# Patient Record
Sex: FEMALE | Race: WHITE | Employment: OTHER | URBAN - METROPOLITAN AREA
[De-identification: names, ages, dates, MRNs, and addresses within clinical notes are randomized per-mention and may not be internally consistent; named-entity substitution may affect disease eponyms.]

---

## 2021-05-26 ENCOUNTER — APPOINTMENT (OUTPATIENT)
Dept: CT IMAGING | Age: 76
End: 2021-05-26
Attending: EMERGENCY MEDICINE
Payer: MEDICARE

## 2021-05-26 ENCOUNTER — HOSPITAL ENCOUNTER (OUTPATIENT)
Age: 76
Setting detail: OBSERVATION
Discharge: HOME | End: 2021-05-28
Attending: EMERGENCY MEDICINE | Admitting: INTERNAL MEDICINE
Payer: MEDICARE

## 2021-05-26 ENCOUNTER — APPOINTMENT (OUTPATIENT)
Dept: GENERAL RADIOLOGY | Age: 76
End: 2021-05-26
Attending: EMERGENCY MEDICINE
Payer: MEDICARE

## 2021-05-26 DIAGNOSIS — M54.6 ACUTE BILATERAL THORACIC BACK PAIN: ICD-10-CM

## 2021-05-26 DIAGNOSIS — R55 NEAR SYNCOPE: Primary | ICD-10-CM

## 2021-05-26 DIAGNOSIS — R73.9 HYPERGLYCEMIA: ICD-10-CM

## 2021-05-26 DIAGNOSIS — R55 SYNCOPE AND COLLAPSE: ICD-10-CM

## 2021-05-26 DIAGNOSIS — I10 ESSENTIAL HYPERTENSION: Chronic | ICD-10-CM

## 2021-05-26 PROBLEM — R07.9 CHEST PAIN AT REST: Status: ACTIVE | Noted: 2021-05-26

## 2021-05-26 PROBLEM — E11.9 DM TYPE 2 (DIABETES MELLITUS, TYPE 2) (HCC): Status: ACTIVE | Noted: 2021-05-26

## 2021-05-26 LAB
ALBUMIN SERPL-MCNC: 3.4 G/DL (ref 3.5–5)
ALBUMIN/GLOB SERPL: 1.1 {RATIO} (ref 1.1–2.2)
ALP SERPL-CCNC: 74 U/L (ref 45–117)
ALT SERPL-CCNC: 29 U/L (ref 12–78)
ANION GAP SERPL CALC-SCNC: 9 MMOL/L (ref 5–15)
AST SERPL-CCNC: 25 U/L (ref 15–37)
BASOPHILS # BLD: 0.1 K/UL (ref 0–0.1)
BASOPHILS NFR BLD: 1 % (ref 0–1)
BILIRUB SERPL-MCNC: 0.7 MG/DL (ref 0.2–1)
BNP SERPL-MCNC: 196 PG/ML (ref 0–450)
BUN SERPL-MCNC: 29 MG/DL (ref 6–20)
BUN/CREAT SERPL: 20 (ref 12–20)
CALCIUM SERPL-MCNC: 8.9 MG/DL (ref 8.5–10.1)
CHLORIDE SERPL-SCNC: 103 MMOL/L (ref 97–108)
CO2 SERPL-SCNC: 25 MMOL/L (ref 21–32)
COMMENT, HOLDF: NORMAL
CREAT SERPL-MCNC: 1.48 MG/DL (ref 0.55–1.02)
D DIMER PPP FEU-MCNC: 0.9 MG/L FEU (ref 0–0.65)
DIFFERENTIAL METHOD BLD: NORMAL
EOSINOPHIL # BLD: 0.2 K/UL (ref 0–0.4)
EOSINOPHIL NFR BLD: 3 % (ref 0–7)
ERYTHROCYTE [DISTWIDTH] IN BLOOD BY AUTOMATED COUNT: 12.9 % (ref 11.5–14.5)
GLOBULIN SER CALC-MCNC: 3.1 G/DL (ref 2–4)
GLUCOSE BLD STRIP.AUTO-MCNC: 223 MG/DL (ref 65–117)
GLUCOSE SERPL-MCNC: 236 MG/DL (ref 65–100)
HCT VFR BLD AUTO: 36.2 % (ref 35–47)
HGB BLD-MCNC: 12.2 G/DL (ref 11.5–16)
IMM GRANULOCYTES # BLD AUTO: 0 K/UL (ref 0–0.04)
IMM GRANULOCYTES NFR BLD AUTO: 0 % (ref 0–0.5)
LYMPHOCYTES # BLD: 1.4 K/UL (ref 0.8–3.5)
LYMPHOCYTES NFR BLD: 19 % (ref 12–49)
MCH RBC QN AUTO: 30.2 PG (ref 26–34)
MCHC RBC AUTO-ENTMCNC: 33.7 G/DL (ref 30–36.5)
MCV RBC AUTO: 89.6 FL (ref 80–99)
MONOCYTES # BLD: 0.6 K/UL (ref 0–1)
MONOCYTES NFR BLD: 8 % (ref 5–13)
NEUTS SEG # BLD: 5.2 K/UL (ref 1.8–8)
NEUTS SEG NFR BLD: 69 % (ref 32–75)
NRBC # BLD: 0 K/UL (ref 0–0.01)
NRBC BLD-RTO: 0 PER 100 WBC
PLATELET # BLD AUTO: 267 K/UL (ref 150–400)
PMV BLD AUTO: 11 FL (ref 8.9–12.9)
POTASSIUM SERPL-SCNC: 4.8 MMOL/L (ref 3.5–5.1)
PROT SERPL-MCNC: 6.5 G/DL (ref 6.4–8.2)
RBC # BLD AUTO: 4.04 M/UL (ref 3.8–5.2)
SAMPLES BEING HELD,HOLD: NORMAL
SERVICE CMNT-IMP: ABNORMAL
SODIUM SERPL-SCNC: 137 MMOL/L (ref 136–145)
TROPONIN I SERPL-MCNC: <0.05 NG/ML
WBC # BLD AUTO: 7.4 K/UL (ref 3.6–11)

## 2021-05-26 PROCEDURE — 36415 COLL VENOUS BLD VENIPUNCTURE: CPT

## 2021-05-26 PROCEDURE — 84443 ASSAY THYROID STIM HORMONE: CPT

## 2021-05-26 PROCEDURE — 71275 CT ANGIOGRAPHY CHEST: CPT

## 2021-05-26 PROCEDURE — 99285 EMERGENCY DEPT VISIT HI MDM: CPT

## 2021-05-26 PROCEDURE — 71045 X-RAY EXAM CHEST 1 VIEW: CPT

## 2021-05-26 PROCEDURE — 65270000029 HC RM PRIVATE

## 2021-05-26 PROCEDURE — 83880 ASSAY OF NATRIURETIC PEPTIDE: CPT

## 2021-05-26 PROCEDURE — 85379 FIBRIN DEGRADATION QUANT: CPT

## 2021-05-26 PROCEDURE — 96360 HYDRATION IV INFUSION INIT: CPT

## 2021-05-26 PROCEDURE — 74011000636 HC RX REV CODE- 636: Performed by: EMERGENCY MEDICINE

## 2021-05-26 PROCEDURE — 93005 ELECTROCARDIOGRAM TRACING: CPT

## 2021-05-26 PROCEDURE — 85025 COMPLETE CBC W/AUTO DIFF WBC: CPT

## 2021-05-26 PROCEDURE — 84484 ASSAY OF TROPONIN QUANT: CPT

## 2021-05-26 PROCEDURE — 96361 HYDRATE IV INFUSION ADD-ON: CPT

## 2021-05-26 PROCEDURE — 74011250636 HC RX REV CODE- 250/636: Performed by: EMERGENCY MEDICINE

## 2021-05-26 PROCEDURE — 99218 HC RM OBSERVATION: CPT

## 2021-05-26 PROCEDURE — 80053 COMPREHEN METABOLIC PANEL: CPT

## 2021-05-26 PROCEDURE — 82962 GLUCOSE BLOOD TEST: CPT

## 2021-05-26 RX ORDER — MONTELUKAST SODIUM 10 MG/1
10 TABLET ORAL DAILY
COMMUNITY
Start: 2021-03-16

## 2021-05-26 RX ORDER — NATEGLINIDE 120 MG/1
120 TABLET ORAL
COMMUNITY
Start: 2021-04-22

## 2021-05-26 RX ORDER — ICOSAPENT ETHYL 1000 MG/1
2 CAPSULE ORAL 2 TIMES DAILY WITH MEALS
COMMUNITY
Start: 2021-04-20 | End: 2022-03-23

## 2021-05-26 RX ADMIN — IOPAMIDOL 100 ML: 755 INJECTION, SOLUTION INTRAVENOUS at 22:41

## 2021-05-26 RX ADMIN — SODIUM CHLORIDE 1000 ML: 900 INJECTION, SOLUTION INTRAVENOUS at 23:01

## 2021-05-26 RX ADMIN — SODIUM CHLORIDE 1000 ML: 9 INJECTION, SOLUTION INTRAVENOUS at 21:38

## 2021-05-26 NOTE — ED PROVIDER NOTES
76-year-old female with history of asthma, arthritis, type 2 diabetes, GERD, hypertension presents to the emergency department today with chief complaint of generalized fatigue and weakness with an episode of back pain which radiates to bilateral shoulders. At approximately 6:00 this evening she was making dinner when she had a sudden onset of presyncopal symptoms with lightheadedness with a pressure type pain in her upper back with radiation to bilateral shoulders. She felt short of breath and nausea at the time. She denies any recent fever. She was treated with prednisone for sinus infection approximately 2 to 3 weeks ago. She had some diarrhea after the onset of her fatigue which has since resolved. The patient endorses increased stress as her sister recently passed away. The history is provided by the patient and medical records. Fatigue  This is a new problem. The current episode started less than 1 hour ago. The problem has not changed since onset. There was no focality noted. Pertinent negatives include no focal weakness, no loss of sensation, no loss of balance, no speech difficulty, no agitation, no mental status change, no unresponsiveness and no disorientation. There has been no fever. Associated symptoms include shortness of breath and nausea. Pertinent negatives include no chest pain, no vomiting, no altered mental status, no confusion and no headaches. Associated medical issues do not include trauma.         Past Medical History:   Diagnosis Date    Arthritis     Asthma     Diabetes mellitus, type 2 ([address]     GERD (gastroesophageal reflux disease)     Hyperlipidemia     Hypertension     Thyroid cancer ([facility] [address]        Past Surgical History:   Procedure Laterality Date    HX APPENDECTOMY      HX  SECTION      HX DILATION AND CURETTAGE      HX THYROIDECTOMY      HX TOTAL ABDOMINAL HYSTERECTOMY           Family History:   Problem Relation Age of Onset    Asthma Father    Anahy Hinton Hypertension Father     Hypertension Mother     Stroke Mother     Cancer Sister         thyroid       Social History     Socioeconomic History    Marital status:      Spouse name: Not on file    Number of children: Not on file    Years of education: Not on file    Highest education level: Not on file   Occupational History    Not on file   Tobacco Use    Smoking status: Former Smoker    Smokeless tobacco: Never Used   Substance and Sexual Activity    Alcohol use: No    Drug use: No    Sexual activity: Yes   Other Topics Concern    Not on file   Social History Narrative    Not on file     Social Determinants of Health     Financial Resource Strain:     Difficulty of Paying Living Expenses:    Food Insecurity:     Worried About [address] in the Last Year:     [address] [employer] in the Last Year:    Transportation Needs:     Lack of Transportation (Medical):  Lack of Transportation (Non-Medical):    Physical Activity:     Days of Exercise per Week:     Minutes of Exercise per Session:    Stress:     Feeling of Stress :    Social Connections:     Frequency of Communication with Friends and Family:     Frequency of Social Gatherings with Friends and Family:     Attends Gnosticism Services:     Active Member of Clubs or Organizations:     Attends Club or Organization Meetings:     Marital Status:    Intimate Partner Violence:     Fear of Current or Ex-Partner:     Emotionally Abused:     Physically Abused:     Sexually Abused: ALLERGIES: Adhesive, Ciprofloxacin-dexamethasone, Codeine, Diflucan [fluconazole], Statins-hmg-coa reductase inhibitors, and Sulfa (sulfonamide antibiotics)    Review of Systems   Constitutional: Positive for fatigue. Negative for fever. HENT: Negative for sneezing and sore throat. Respiratory: Positive for shortness of breath. Negative for cough. Cardiovascular: Negative for chest pain and leg swelling.    Gastrointestinal: Positive for nausea. Negative for abdominal pain, diarrhea and vomiting. Genitourinary: Negative for difficulty urinating and dysuria. Musculoskeletal: Negative for arthralgias and myalgias. Skin: Negative for color change and rash. Neurological: Negative for focal weakness, speech difficulty, weakness, headaches and loss of balance. Psychiatric/Behavioral: Negative for agitation, behavioral problems and confusion. There were no vitals filed for this visit. Physical Exam  Vitals and nursing note reviewed. Constitutional:       General: She is not in acute distress. Appearance: Normal appearance. She is well-developed. She is not ill-appearing, toxic-appearing or diaphoretic. HENT:      Head: Normocephalic and atraumatic. Nose: Nose normal.      Mouth/Throat:      Mouth: Mucous membranes are moist.      Pharynx: Oropharynx is clear. Eyes:      Extraocular Movements: Extraocular movements intact. Conjunctiva/sclera: Conjunctivae normal.      Pupils: Pupils are equal, round, and reactive to light. Cardiovascular:      Rate and Rhythm: Normal rate and regular rhythm. Pulses: Normal pulses. Heart sounds: Normal heart sounds. Pulmonary:      Effort: Pulmonary effort is normal. No respiratory distress. Breath sounds: Normal breath sounds. No wheezing. Chest:      Chest wall: No tenderness. Abdominal:      General: Abdomen is flat. There is no distension. Palpations: Abdomen is soft. Tenderness: There is no abdominal tenderness. There is no guarding or rebound. Musculoskeletal:         General: No swelling, tenderness, deformity or signs of injury. Normal range of motion. Cervical back: Normal range of motion and neck supple. No rigidity. No muscular tenderness. Right lower leg: No edema. Left lower leg: No edema. Skin:     General: Skin is warm and dry. Capillary Refill: Capillary refill takes less than 2 seconds.    Neurological: General: No focal deficit present. Mental Status: She is alert and oriented to person, place, and time. Psychiatric:         Mood and Affect: Mood normal.         Behavior: Behavior normal.          MDM  Number of Diagnoses or Management Options     Amount and/or Complexity of Data Reviewed  Tests in the radiology section of CPT®: reviewed           Procedures          ED EKG interpretation:  Rhythm: normal sinus rhythm. Rate (approx.): 72. Axis: normal.  ST segment:  No concerning ST elevations or depressions. This EKG was interpreted by Pratibha Allen MD,ED Provider. 7:15 PM  Change of shift. Care of patient signed over to Dr. Irma Bliss. Handoff complete.

## 2021-05-26 NOTE — ED NOTES
Bedside and Verbal shift change report given to Ayan (oncoming nurse) by Jailyn Torres RN (offgoing nurse). Report included the following information SBAR, Kardex, ED Summary and MAR.

## 2021-05-26 NOTE — ED TRIAGE NOTES
Pt arrived with her daughter to be assessed for dizziness,feeling like she is going to  pass out, back pain across the her shoulder blades. P t is on heart medication daily. Onset was sudden, pt was trying to cook dinner, and felt like she was going to pass out. Pt ambulated in with her daughter holding and guiding her one side.

## 2021-05-27 ENCOUNTER — APPOINTMENT (OUTPATIENT)
Dept: NON INVASIVE DIAGNOSTICS | Age: 76
End: 2021-05-27
Attending: INTERNAL MEDICINE
Payer: MEDICARE

## 2021-05-27 LAB
ALBUMIN SERPL-MCNC: 3 G/DL (ref 3.5–5)
ALBUMIN/GLOB SERPL: 1.2 {RATIO} (ref 1.1–2.2)
ALP SERPL-CCNC: 62 U/L (ref 45–117)
ALT SERPL-CCNC: 22 U/L (ref 12–78)
ANION GAP SERPL CALC-SCNC: 6 MMOL/L (ref 5–15)
AST SERPL-CCNC: 14 U/L (ref 15–37)
ATRIAL RATE: 62 BPM
ATRIAL RATE: 72 BPM
BILIRUB DIRECT SERPL-MCNC: 0.1 MG/DL (ref 0–0.2)
BILIRUB SERPL-MCNC: 0.5 MG/DL (ref 0.2–1)
BUN SERPL-MCNC: 20 MG/DL (ref 6–20)
BUN/CREAT SERPL: 20 (ref 12–20)
CALCIUM SERPL-MCNC: 8.4 MG/DL (ref 8.5–10.1)
CALCULATED P AXIS, ECG09: 28 DEGREES
CALCULATED P AXIS, ECG09: 51 DEGREES
CALCULATED R AXIS, ECG10: -18 DEGREES
CALCULATED R AXIS, ECG10: -8 DEGREES
CALCULATED T AXIS, ECG11: 41 DEGREES
CALCULATED T AXIS, ECG11: 65 DEGREES
CHLORIDE SERPL-SCNC: 110 MMOL/L (ref 97–108)
CO2 SERPL-SCNC: 24 MMOL/L (ref 21–32)
CREAT SERPL-MCNC: 0.98 MG/DL (ref 0.55–1.02)
DIAGNOSIS, 93000: NORMAL
DIAGNOSIS, 93000: NORMAL
ECHO AO ASC DIAM: 3.15 CM
ECHO AO ROOT DIAM: 3.17 CM
ECHO AV AREA PEAK VELOCITY: 2.46 CM2
ECHO AV AREA/BSA PEAK VELOCITY: 1.4 CM2/M2
ECHO AV PEAK GRADIENT: 6.38 MMHG
ECHO AV PEAK VELOCITY: 126.26 CM/S
ECHO IVC PROX: 1.93 CM
ECHO LA AREA 4C: 15.18 CM2
ECHO LA MAJOR AXIS: 2.6 CM
ECHO LA MINOR AXIS: 1.43 CM
ECHO LA VOL 2C: 38.43 ML (ref 22–52)
ECHO LA VOL 4C: 34.42 ML (ref 22–52)
ECHO LA VOL BP: 42.42 ML (ref 22–52)
ECHO LA VOL/BSA BIPLANE: 23.31 ML/M2 (ref 16–28)
ECHO LA VOLUME INDEX A2C: 21.12 ML/M2 (ref 16–28)
ECHO LA VOLUME INDEX A4C: 18.91 ML/M2 (ref 16–28)
ECHO LV E' LATERAL VELOCITY: 7.39 CENTIMETER/SECOND
ECHO LV E' SEPTAL VELOCITY: 5.09 CENTIMETER/SECOND
ECHO LV INTERNAL DIMENSION DIASTOLIC: 4.31 CM (ref 3.9–5.3)
ECHO LV INTERNAL DIMENSION SYSTOLIC: 2.5 CM
ECHO LV IVSD: 0.78 CM (ref 0.6–0.9)
ECHO LV MASS 2D: 102.3 G (ref 67–162)
ECHO LV MASS INDEX 2D: 56.2 G/M2 (ref 43–95)
ECHO LV POSTERIOR WALL DIASTOLIC: 0.78 CM (ref 0.6–0.9)
ECHO LVOT DIAM: 1.88 CM
ECHO LVOT PEAK GRADIENT: 4.96 MMHG
ECHO LVOT PEAK VELOCITY: 111.35 CM/S
ECHO MV A VELOCITY: 141.21 CENTIMETER/SECOND
ECHO MV AREA PHT: 1.89 CM2
ECHO MV E DECELERATION TIME (DT): 400.67 MS
ECHO MV E VELOCITY: 83.18 CENTIMETER/SECOND
ECHO MV PRESSURE HALF TIME (PHT): 116.2 MS
ECHO PV MAX VELOCITY: 84.69 CM/S
ECHO PV PEAK INSTANTANEOUS GRADIENT SYSTOLIC: 2.87 MMHG
ECHO RV INTERNAL DIMENSION: 3.83 CM
ECHO RV TAPSE: 1.73 CM (ref 1.5–2)
ECHO TV REGURGITANT MAX VELOCITY: 249.82 CM/S
ECHO TV REGURGITANT PEAK GRADIENT: 24.96 MMHG
ERYTHROCYTE [DISTWIDTH] IN BLOOD BY AUTOMATED COUNT: 13.1 % (ref 11.5–14.5)
EST. AVERAGE GLUCOSE BLD GHB EST-MCNC: 186 MG/DL
GLOBULIN SER CALC-MCNC: 2.6 G/DL (ref 2–4)
GLUCOSE BLD STRIP.AUTO-MCNC: 132 MG/DL (ref 65–117)
GLUCOSE BLD STRIP.AUTO-MCNC: 144 MG/DL (ref 65–117)
GLUCOSE BLD STRIP.AUTO-MCNC: 156 MG/DL (ref 65–117)
GLUCOSE BLD STRIP.AUTO-MCNC: 160 MG/DL (ref 65–117)
GLUCOSE BLD STRIP.AUTO-MCNC: 171 MG/DL (ref 65–117)
GLUCOSE SERPL-MCNC: 188 MG/DL (ref 65–100)
HBA1C MFR BLD: 8.1 % (ref 4–5.6)
HCT VFR BLD AUTO: 33.6 % (ref 35–47)
HGB BLD-MCNC: 10.9 G/DL (ref 11.5–16)
LA VOL DISK BP: 38.61 ML (ref 22–52)
MAGNESIUM SERPL-MCNC: 2.1 MG/DL (ref 1.6–2.4)
MCH RBC QN AUTO: 30 PG (ref 26–34)
MCHC RBC AUTO-ENTMCNC: 32.4 G/DL (ref 30–36.5)
MCV RBC AUTO: 92.6 FL (ref 80–99)
NRBC # BLD: 0 K/UL (ref 0–0.01)
NRBC BLD-RTO: 0 PER 100 WBC
P-R INTERVAL, ECG05: 148 MS
P-R INTERVAL, ECG05: 170 MS
PHOSPHATE SERPL-MCNC: 2.8 MG/DL (ref 2.6–4.7)
PLATELET # BLD AUTO: 218 K/UL (ref 150–400)
PMV BLD AUTO: 10.4 FL (ref 8.9–12.9)
POTASSIUM SERPL-SCNC: 4.5 MMOL/L (ref 3.5–5.1)
PROT SERPL-MCNC: 5.6 G/DL (ref 6.4–8.2)
Q-T INTERVAL, ECG07: 412 MS
Q-T INTERVAL, ECG07: 436 MS
QRS DURATION, ECG06: 70 MS
QRS DURATION, ECG06: 78 MS
QTC CALCULATION (BEZET), ECG08: 442 MS
QTC CALCULATION (BEZET), ECG08: 451 MS
RBC # BLD AUTO: 3.63 M/UL (ref 3.8–5.2)
SERVICE CMNT-IMP: ABNORMAL
SODIUM SERPL-SCNC: 140 MMOL/L (ref 136–145)
TROPONIN I SERPL-MCNC: <0.05 NG/ML
TSH SERPL DL<=0.05 MIU/L-ACNC: 1.01 UIU/ML (ref 0.36–3.74)
VENTRICULAR RATE, ECG03: 62 BPM
VENTRICULAR RATE, ECG03: 72 BPM
WBC # BLD AUTO: 5.6 K/UL (ref 3.6–11)

## 2021-05-27 PROCEDURE — 84484 ASSAY OF TROPONIN QUANT: CPT

## 2021-05-27 PROCEDURE — 96372 THER/PROPH/DIAG INJ SC/IM: CPT

## 2021-05-27 PROCEDURE — 74011250636 HC RX REV CODE- 250/636: Performed by: INTERNAL MEDICINE

## 2021-05-27 PROCEDURE — 83036 HEMOGLOBIN GLYCOSYLATED A1C: CPT

## 2021-05-27 PROCEDURE — 85027 COMPLETE CBC AUTOMATED: CPT

## 2021-05-27 PROCEDURE — 96374 THER/PROPH/DIAG INJ IV PUSH: CPT

## 2021-05-27 PROCEDURE — 74011250637 HC RX REV CODE- 250/637: Performed by: INTERNAL MEDICINE

## 2021-05-27 PROCEDURE — 99218 HC RM OBSERVATION: CPT

## 2021-05-27 PROCEDURE — 97535 SELF CARE MNGMENT TRAINING: CPT

## 2021-05-27 PROCEDURE — 74011250637 HC RX REV CODE- 250/637: Performed by: FAMILY MEDICINE

## 2021-05-27 PROCEDURE — 80048 BASIC METABOLIC PNL TOTAL CA: CPT

## 2021-05-27 PROCEDURE — 36415 COLL VENOUS BLD VENIPUNCTURE: CPT

## 2021-05-27 PROCEDURE — 93306 TTE W/DOPPLER COMPLETE: CPT

## 2021-05-27 PROCEDURE — 80076 HEPATIC FUNCTION PANEL: CPT

## 2021-05-27 PROCEDURE — 97165 OT EVAL LOW COMPLEX 30 MIN: CPT

## 2021-05-27 PROCEDURE — 74011636637 HC RX REV CODE- 636/637: Performed by: INTERNAL MEDICINE

## 2021-05-27 PROCEDURE — 84100 ASSAY OF PHOSPHORUS: CPT

## 2021-05-27 PROCEDURE — 83735 ASSAY OF MAGNESIUM: CPT

## 2021-05-27 PROCEDURE — 93005 ELECTROCARDIOGRAM TRACING: CPT

## 2021-05-27 PROCEDURE — 97161 PT EVAL LOW COMPLEX 20 MIN: CPT

## 2021-05-27 PROCEDURE — 82962 GLUCOSE BLOOD TEST: CPT

## 2021-05-27 PROCEDURE — 99220 PR INITIAL OBSERVATION CARE/DAY 70 MINUTES: CPT | Performed by: SPECIALIST

## 2021-05-27 RX ORDER — BACLOFEN 20 MG
400 TABLET ORAL DAILY
COMMUNITY

## 2021-05-27 RX ORDER — ONDANSETRON 2 MG/ML
4 INJECTION INTRAMUSCULAR; INTRAVENOUS
Status: DISCONTINUED | OUTPATIENT
Start: 2021-05-27 | End: 2021-05-28 | Stop reason: HOSPADM

## 2021-05-27 RX ORDER — ACETAMINOPHEN 650 MG/1
650 SUPPOSITORY RECTAL
Status: DISCONTINUED | OUTPATIENT
Start: 2021-05-27 | End: 2021-05-28 | Stop reason: HOSPADM

## 2021-05-27 RX ORDER — SODIUM CHLORIDE 0.9 % (FLUSH) 0.9 %
5-40 SYRINGE (ML) INJECTION EVERY 8 HOURS
Status: DISCONTINUED | OUTPATIENT
Start: 2021-05-27 | End: 2021-05-28 | Stop reason: HOSPADM

## 2021-05-27 RX ORDER — FACIAL-BODY WIPES
10 EACH TOPICAL DAILY PRN
Status: DISCONTINUED | OUTPATIENT
Start: 2021-05-27 | End: 2021-05-28 | Stop reason: HOSPADM

## 2021-05-27 RX ORDER — ACETAMINOPHEN 325 MG/1
650 TABLET ORAL
Status: DISCONTINUED | OUTPATIENT
Start: 2021-05-27 | End: 2021-05-28 | Stop reason: HOSPADM

## 2021-05-27 RX ORDER — LISINOPRIL 20 MG/1
20 TABLET ORAL ONCE
Status: COMPLETED | OUTPATIENT
Start: 2021-05-27 | End: 2021-05-27

## 2021-05-27 RX ORDER — PROMETHAZINE HYDROCHLORIDE 25 MG/1
12.5 TABLET ORAL
Status: DISCONTINUED | OUTPATIENT
Start: 2021-05-27 | End: 2021-05-28 | Stop reason: HOSPADM

## 2021-05-27 RX ORDER — MORPHINE SULFATE 2 MG/ML
1 INJECTION, SOLUTION INTRAMUSCULAR; INTRAVENOUS
Status: DISCONTINUED | OUTPATIENT
Start: 2021-05-27 | End: 2021-05-28 | Stop reason: HOSPADM

## 2021-05-27 RX ORDER — HYDRALAZINE HYDROCHLORIDE 20 MG/ML
20 INJECTION INTRAMUSCULAR; INTRAVENOUS
Status: DISCONTINUED | OUTPATIENT
Start: 2021-05-27 | End: 2021-05-28 | Stop reason: HOSPADM

## 2021-05-27 RX ORDER — MELATONIN
5000 DAILY
COMMUNITY

## 2021-05-27 RX ORDER — SODIUM CHLORIDE 0.9 % (FLUSH) 0.9 %
5-40 SYRINGE (ML) INJECTION AS NEEDED
Status: DISCONTINUED | OUTPATIENT
Start: 2021-05-27 | End: 2021-05-28 | Stop reason: HOSPADM

## 2021-05-27 RX ORDER — UMECLIDINIUM 62.5 UG/1
1 AEROSOL, POWDER ORAL DAILY
COMMUNITY

## 2021-05-27 RX ORDER — LISINOPRIL 20 MG/1
20 TABLET ORAL DAILY
Status: DISCONTINUED | OUTPATIENT
Start: 2021-05-28 | End: 2021-05-28 | Stop reason: HOSPADM

## 2021-05-27 RX ORDER — LEVOTHYROXINE SODIUM 100 UG/1
100 TABLET ORAL
Status: DISCONTINUED | OUTPATIENT
Start: 2021-05-27 | End: 2021-05-28 | Stop reason: HOSPADM

## 2021-05-27 RX ORDER — SAME BUTANEDISULFONATE/BETAINE 400-600 MG
250 POWDER IN PACKET (EA) ORAL 2 TIMES DAILY
COMMUNITY

## 2021-05-27 RX ORDER — LANOLIN ALCOHOL/MO/W.PET/CERES
325 CREAM (GRAM) TOPICAL
COMMUNITY

## 2021-05-27 RX ORDER — SODIUM CHLORIDE 9 MG/ML
25 INJECTION, SOLUTION INTRAVENOUS AS NEEDED
Status: DISCONTINUED | OUTPATIENT
Start: 2021-05-27 | End: 2021-05-28 | Stop reason: HOSPADM

## 2021-05-27 RX ORDER — SODIUM CHLORIDE 9 MG/ML
75 INJECTION, SOLUTION INTRAVENOUS CONTINUOUS
Status: DISCONTINUED | OUTPATIENT
Start: 2021-05-27 | End: 2021-05-28 | Stop reason: HOSPADM

## 2021-05-27 RX ORDER — GUAIFENESIN 100 MG/5ML
81 LIQUID (ML) ORAL DAILY
Status: DISCONTINUED | OUTPATIENT
Start: 2021-05-27 | End: 2021-05-28 | Stop reason: HOSPADM

## 2021-05-27 RX ORDER — ENOXAPARIN SODIUM 100 MG/ML
40 INJECTION SUBCUTANEOUS DAILY
Status: DISCONTINUED | OUTPATIENT
Start: 2021-05-27 | End: 2021-05-28 | Stop reason: HOSPADM

## 2021-05-27 RX ORDER — DEXTROSE 50 % IN WATER (D50W) INTRAVENOUS SYRINGE
12.5-25 AS NEEDED
Status: DISCONTINUED | OUTPATIENT
Start: 2021-05-27 | End: 2021-05-28 | Stop reason: HOSPADM

## 2021-05-27 RX ORDER — MAGNESIUM SULFATE 100 %
4 CRYSTALS MISCELLANEOUS AS NEEDED
Status: DISCONTINUED | OUTPATIENT
Start: 2021-05-27 | End: 2021-05-28 | Stop reason: HOSPADM

## 2021-05-27 RX ORDER — IPRATROPIUM BROMIDE AND ALBUTEROL SULFATE 2.5; .5 MG/3ML; MG/3ML
3 SOLUTION RESPIRATORY (INHALATION)
Status: DISCONTINUED | OUTPATIENT
Start: 2021-05-27 | End: 2021-05-28 | Stop reason: HOSPADM

## 2021-05-27 RX ORDER — AMLODIPINE BESYLATE 5 MG/1
2.5 TABLET ORAL DAILY
Status: DISCONTINUED | OUTPATIENT
Start: 2021-05-28 | End: 2021-05-28 | Stop reason: HOSPADM

## 2021-05-27 RX ORDER — AMLODIPINE BESYLATE 2.5 MG/1
2.5 TABLET ORAL DAILY
COMMUNITY
End: 2021-07-01 | Stop reason: DRUGHIGH

## 2021-05-27 RX ORDER — LIOTHYRONINE SODIUM 5 UG/1
12.5 TABLET ORAL
Status: DISCONTINUED | OUTPATIENT
Start: 2021-05-27 | End: 2021-05-28 | Stop reason: HOSPADM

## 2021-05-27 RX ORDER — AMLODIPINE BESYLATE 5 MG/1
2.5 TABLET ORAL ONCE
Status: COMPLETED | OUTPATIENT
Start: 2021-05-27 | End: 2021-05-27

## 2021-05-27 RX ORDER — EZETIMIBE 10 MG/1
10 TABLET ORAL DAILY
Status: DISCONTINUED | OUTPATIENT
Start: 2021-05-27 | End: 2021-05-28 | Stop reason: HOSPADM

## 2021-05-27 RX ORDER — MONTELUKAST SODIUM 10 MG/1
10 TABLET ORAL DAILY
Status: DISCONTINUED | OUTPATIENT
Start: 2021-05-27 | End: 2021-05-28 | Stop reason: HOSPADM

## 2021-05-27 RX ADMIN — LIOTHYRONINE SODIUM 12.5 MCG: 5 TABLET ORAL at 10:05

## 2021-05-27 RX ADMIN — ASPIRIN 81 MG: 81 TABLET, CHEWABLE ORAL at 10:04

## 2021-05-27 RX ADMIN — MONTELUKAST 10 MG: 10 TABLET, FILM COATED ORAL at 10:04

## 2021-05-27 RX ADMIN — LEVOTHYROXINE SODIUM 100 MCG: 0.1 TABLET ORAL at 10:04

## 2021-05-27 RX ADMIN — EZETIMIBE 10 MG: 10 TABLET ORAL at 10:04

## 2021-05-27 RX ADMIN — AMLODIPINE BESYLATE 2.5 MG: 5 TABLET ORAL at 15:24

## 2021-05-27 RX ADMIN — SODIUM CHLORIDE 75 ML/HR: 9 INJECTION, SOLUTION INTRAVENOUS at 04:52

## 2021-05-27 RX ADMIN — HUMAN INSULIN 2 UNITS: 100 INJECTION, SOLUTION SUBCUTANEOUS at 17:26

## 2021-05-27 RX ADMIN — ENOXAPARIN SODIUM 40 MG: 40 INJECTION SUBCUTANEOUS at 08:39

## 2021-05-27 RX ADMIN — LISINOPRIL 20 MG: 20 TABLET ORAL at 15:24

## 2021-05-27 RX ADMIN — Medication 10 ML: at 15:26

## 2021-05-27 RX ADMIN — ACETAMINOPHEN 650 MG: 325 TABLET ORAL at 10:09

## 2021-05-27 RX ADMIN — Medication 10 ML: at 20:42

## 2021-05-27 RX ADMIN — HUMAN INSULIN 2 UNITS: 100 INJECTION, SOLUTION SUBCUTANEOUS at 08:39

## 2021-05-27 RX ADMIN — HYDRALAZINE HYDROCHLORIDE 20 MG: 20 INJECTION INTRAMUSCULAR; INTRAVENOUS at 12:33

## 2021-05-27 NOTE — PROGRESS NOTES
Spiritual Care Assessment/Progress Note  Juwan Pompa      NAME: Josiah Spears      MRN: [id number]  AGE: 64 y.o. SEX: female  Zoroastrian Affiliation: Sabianist   Language: English     5/27/2021     Total Time (in minutes): 10     Spiritual Assessment begun in [facility]  MED SURG 2 through conversation with:         []Patient        [] Family    [] Friend(s)        Reason for Consult: Advance medical directive consult     Spiritual beliefs: (Please include comment if needed)     [] Identifies with a barbara tradition:         [] Supported by a barbara community:            [] Claims no spiritual orientation:           [] Seeking spiritual identity:                [] Adheres to an individual form of spirituality:           [x] Not able to assess:                           Identified resources for coping:      [] Prayer                               [] Music                  [] Guided Imagery     [] Family/friends                 [] Pet visits     [] Devotional reading                         [x] Unknown     [] Other:                                        Interventions offered during this visit: (See comments for more details)    Patient Interventions: Advance medical directive consult (ATTEMPTED)           Plan of Care:     [] Support spiritual and/or cultural needs    [] Support AMD and/or advance care planning process      [] Support grieving process   [] Coordinate Rites and/or Rituals    [] Coordination with community clergy   [] No spiritual needs identified at this time   [] Detailed Plan of Care below (See Comments)  [] Make referral to Music Therapy  [] Make referral to Pet Therapy     [] Make referral to Addiction services  [] Make referral to [facility]  [] Make referral to Spiritual Care Partner  [] No future visits requested        [x] Follow up upon further referrals     Comments: Responded to Care Management request for an Advance Medical Directive consult on 5 Med Surg.   Staff was working with Miss Pina Notice in her room, unable to assess at this time. Left AMD form and Your Right to Decide booklet with her nurse. Advised nurse to contact [facility] for any further referrals.   Visited by: Bina Bermudez., MS., [id number] ([id number])

## 2021-05-27 NOTE — PROGRESS NOTES
Problem: Falls - Risk of  Goal: *Absence of Falls  Description: Document Garo Yates Fall Risk and appropriate interventions in the flowsheet.   Outcome: Progressing Towards Goal  Note: Fall Risk Interventions:  Mobility Interventions: Communicate number of staff needed for ambulation/transfer         Medication Interventions: Bed/chair exit alarm                   Problem: Patient Education: Go to Patient Education Activity  Goal: Patient/Family Education  Outcome: Progressing Towards Goal

## 2021-05-27 NOTE — PROGRESS NOTES
CARDIOLOGY CONSULTATION NOTE    Mike Neff MD,  [address], [place], [address] [facility]  Phone [phone]; Fax [phone]  Mobile [phone]   Voice Mail [phone]                               2021  6:33 PM  Giulia Nunez MD  :  [id number]   MRN:  [id number]     CC: Dizziness and feels like she is going to pass out and some high back pain across her shoulders. Reason for consult:  Presyncope and high back pain      Admission Diagnosis: Syncope and collapse [R55]         ATTENTION:   This medical record was transcribed using an electronic medical records/speech recognition system. Although proofread, it may and can contain electronic, spelling and other errors. Corrections may be executed at a later time. Please feel free to contact us for any clarifications as needed. Impression Plan/Recommendation   1. Presyncope  2. Upper back pain              H&H 10.9/33.6 with potassium 4.5, BUN was 20, creatinine 1.98, troponin was less than 0.05, ECG was normal sinus rhythm 1. I do believe that her symptoms are somewhat concerning and her risk factors are significant enough that we should proceed with a exercise Cardiolite. She feels as though she can walk on a treadmill however if she is unable to walk we would convert to a Lexiscan. I told her to bring her inhalers with her. 2. Would place an event loop monitor on her for 4 weeks also gave her information on headache [employer] or [place]         She is a pleasant lady with a history of diabetes type 2, hypertension, asthma is admitted with generalized fatigue and high back pain and presyncopal symptoms. She describes lightheadedness and pain that radiated up her back and radiation to her shoulders. She was slightly short of breath with some nausea.   She was recently treated for upper respiratory tract infection with prednisone antibiotics and had some episodes of diarrhea as well and has been under stress with the recent death of her sister. In the emergency room she had a CT that was negative for dissection or pulmonary embolus. Luz Elena Crespo is a 64 y.o. female I am seeing for presyncope and high back pain. She has a history of diabetes type 2, hypertension and asthma with distant smoking history who was admitted with lightheadedness and high back pain. She states she was having her normal activities yesterday not taking any new medicines and was on the phone with her niece and then started making dinner was almost finished with dinner and then just felt as though she was becoming lightheaded this was followed with pain in her upper back with radiation to her shoulders. There is some slight shortness of breath and some nausea. She subsequently had some diarrhea after that. She was treated with the last 2 weeks for upper respiratory tract infection on prednisone and antibiotics. In the emergency room she had a CT that was negative for dissection or pulmonary embolus. She has had similar symptoms but only as not as significant the last several months and received cardiac monitor about 6 months ago she says it was a Holter monitor. She did not see the cardiologist and was actually Dr. Yoni Michael office that provided her with this. The conclusions were no irregularity in her heart rhythm. She denies any chest pain but may be some slight discomfort. She is been cleaning her house with no difficulties.        Cardiac risk factors: smoking/ tobacco exposure, family history, dyslipidemia, diabetes mellitus, hypertension, post-menopausal.        Allergies   Allergen Reactions    Adhesive Rash    Ciprofloxacin-Dexamethasone Other (comments)     Headache    Codeine Nausea Only    Diflucan [Fluconazole] Other (comments)     Severe Headache    Fenofibrate Unknown (comments)     Cannot recall reaction    Statins-Hmg-Coa Reductase Inhibitors Myalgia    Sulfa (Sulfonamide Antibiotics) Rash Past Medical History:   Diagnosis Date    Arthritis     Asthma     Diabetes mellitus, type 2 ([facility] [address]     GERD (gastroesophageal reflux disease)     Hyperlipidemia     Hypertension     Thyroid cancer ([facility] [address]         Past Surgical History:   Procedure Laterality Date    HX APPENDECTOMY      HX  SECTION      HX DILATION AND CURETTAGE      HX THYROIDECTOMY      HX TOTAL ABDOMINAL HYSTERECTOMY          . Home Medications:  Prior to Admission Medications   Prescriptions Last Dose Informant Patient Reported? Taking? Saccharomyces boulardii (Florastor) 250 mg capsule 2021 at Unknown time Self Yes Yes   Sig: Take 250 mg by mouth two (2) times a day. ZETIA 10 mg tablet 2021 at am Self Yes Yes   Sig: Take 10 mg by mouth daily. amLODIPine (NORVASC) 2.5 mg tablet 2021 at Unknown time Self Yes Yes   Sig: Take 2.5 mg by mouth daily. aspirin 81 mg chewable tablet 2021 at am Self Yes Yes   Sig: Take 81 mg by mouth daily. cholecalciferol (Vitamin D3) (1000 Units /25 mcg) tablet 2021 at Unknown time Self Yes Yes   Sig: Take 1,000 Units by mouth daily. cyanocobalamin, vitamin B-12, 5,000 mcg cap 2021 at Unknown time Self Yes Yes   Sig: Take 5,000 mcg by mouth daily. ferrous sulfate 325 mg (65 mg iron) tablet 2021 at Unknown time Self Yes Yes   Sig: Take 325 mg by mouth Daily (before breakfast). icosapent ethyL (Vascepa) 1 gram capsule 2021 at am Self Yes Yes   Sig: Take 2 Capsules by mouth two (2) times daily (with meals). levalbuterol tartrate (XOPENEX HFA) 45 mcg/actuation inhaler  Self Yes Yes   Sig: Take 2 Puffs by inhalation every four (4) hours as needed for Shortness of Breath. levothyroxine (Synthroid) 100 mcg tablet 2021 at am Self Yes Yes   Sig: Take 100 mcg by mouth Daily (before breakfast). liothyronine (CYTOMEL) 25 mcg tablet 2021 at Unknown time Self Yes Yes   Sig: Take 12.5 mcg by mouth Daily (before breakfast).    magnesium oxide 500 mg tab  Self Yes Yes   Sig: Take 500 mg by mouth daily. montelukast (SINGULAIR) 10 mg tablet 5/26/2021 at am Self Yes Yes   Sig: Take 10 mg by mouth daily. nabumetone (RELAFEN) 500 mg tablet 5/26/2021 at am Self Yes Yes   Sig: Take 1,000 mg by mouth two (2) times a day. nateglinide (STARLIX) 120 mg tablet 5/26/2021 at am Self Yes Yes   Sig: Take 120 mg by mouth Before breakfast, lunch, and dinner. quinapril (ACCUPRIL) 10 mg tablet 5/26/2021 at am Self Yes Yes   Sig: Take 20 mg by mouth two (2) times a day. umeclidinium (Incruse Ellipta) 62.5 mcg/actuation inhaler 5/26/2021 at Unknown time Self Yes Yes   Sig: Take 1 Puff by inhalation daily.       Facility-Administered Medications: None       Hospital Medications:  Current Facility-Administered Medications   Medication Dose Route Frequency    aspirin chewable tablet 81 mg  81 mg Oral DAILY    liothyronine (CYTOMEL) tablet 12.5 mcg  12.5 mcg Oral ACB    montelukast (SINGULAIR) tablet 10 mg  10 mg Oral DAILY    levothyroxine (SYNTHROID) tablet 100 mcg  100 mcg Oral ACB    ezetimibe (ZETIA) tablet 10 mg  10 mg Oral DAILY    0.9% sodium chloride infusion  75 mL/hr IntraVENous CONTINUOUS    sodium chloride (NS) flush 5-40 mL  5-40 mL IntraVENous Q8H    sodium chloride (NS) flush 5-40 mL  5-40 mL IntraVENous PRN    0.9% sodium chloride infusion 25 mL  25 mL IntraVENous PRN    acetaminophen (TYLENOL) tablet 650 mg  650 mg Oral Q6H PRN    Or    acetaminophen (TYLENOL) suppository 650 mg  650 mg Rectal Q6H PRN    bisacodyL (DULCOLAX) suppository 10 mg  10 mg Rectal DAILY PRN    promethazine (PHENERGAN) tablet 12.5 mg  12.5 mg Oral Q6H PRN    Or    ondansetron (ZOFRAN) injection 4 mg  4 mg IntraVENous Q6H PRN    enoxaparin (LOVENOX) injection 40 mg  40 mg SubCUTAneous DAILY    albuterol-ipratropium (DUO-NEB) 2.5 MG-0.5 MG/3 ML  3 mL Nebulization Q4H PRN    insulin regular (NOVOLIN R, HUMULIN R) injection   SubCUTAneous AC&HS    glucose chewable tablet 16 g  4 Tablet Oral PRN    dextrose (D50W) injection syrg 12.5-25 g  12.5-25 g IntraVENous PRN    glucagon (GLUCAGEN) injection 1 mg  1 mg IntraMUSCular PRN    hydrALAZINE (APRESOLINE) 20 mg/mL injection 20 mg  20 mg IntraVENous Q6H PRN    morphine injection 1 mg  1 mg IntraVENous Q4H PRN          OBJECTIVE       Laboratory and Imaging have been reviewed and are notable for      ECG:  Date:  normal EKG, normal sinus rhythm      Diagnostic Tests:     Recent Labs     05/27/21 0520   TROIQ <0.05     Recent Labs     05/27/21  0520 05/26/21  1909    137   K 4.5 4.8   CO2 24 25   BUN 20 29*   CREA 0.98 1.48*   * 236*   PHOS 2.8  --    MG 2.1  --    WBC 5.6 7.4   HGB 10.9* 12.2   HCT 33.6* 36.2    267         Cardiac work up to date:  No specialty comments available. Social History:  Social History     Tobacco Use    Smoking status: Former Smoker    Smokeless tobacco: Never Used   Substance Use Topics    Alcohol use: No       Family History:  Family History   Problem Relation Age of Onset    Asthma Father     Hypertension Father     Hypertension Mother     Stroke Mother     Cancer Sister         thyroid       Review of Symptoms:  A comprehensive review of systems was negative except for that written in the HPI. Physical Exam:      Visit Vitals  BP (!) 196/78   Pulse (!) 52   Temp 97.6 °F (36.4 °C)   Resp 18   Ht 5' 6\" (1.676 m)   Wt 160 lb 0.9 oz (72.6 kg)   SpO2 95%   BMI 25.83 kg/m²     General Appearance:  Well developed, well nourished,alert and oriented x 3, and individual in no acute distress. Ears/Nose/Mouth/Throat:   Hearing grossly normal.Normal oral mucosa,no scleral icterus     Neck: Supple no JVD or bruits,no cervical lymphadenopathy   Chest:   Lungs clear to auscultation bilaterally,no rales rhonchi or wheezing   Cardiovascular:  Regular rate and rhythm, S1, S2 normal,  Murmur. PMI nondisplaced   Abdomen:   Soft, non-tender, bowel sounds are active. No abdominal bruits   Extremities: No edema bilaterally. Pulses detected, no varicosities   Skin: Warm and dry. No bruising  Neuro  Moves all extermities and neurologically intact                                                       I have discussed the diagnosis with the patient and the intended plan as seen in the above orders. Questions were answered concerning future plans. I have discussed medication side effects and warnings with the patient as well. Kay Valverde is in agreement to the plan listed above and wishes to proceed. she  was instructed not to smoke, eat heart healthy diet  and to exercise.      Thank you for the consult       Tracie Caballero MD

## 2021-05-27 NOTE — PROGRESS NOTES
Problem: Mobility Impaired (Adult and Pediatric)  Goal: *Acute Goals and Plan of Care (Insert Text)  Description: FUNCTIONAL STATUS PRIOR TO ADMISSION: Patient was independent and active without use of DME.    HOME SUPPORT PRIOR TO ADMISSION: The patient lived with her  but did not require assist.    Physical Therapy Goals  Initiated 5/27/2021  1. Patient will ambulate with independence for 200 feet with the least restrictive device within 7 day(s). 2.  Patient will ascend/descend 4 stairs with 1 handrail(s) with independence within 7 day(s). Outcome: Not Met   PHYSICAL THERAPY EVALUATION  Patient: Luz Elena Crespo (44 y.o. female)  Date: 5/27/2021  Primary Diagnosis: Syncope and collapse [R55]        Precautions:          ASSESSMENT  Based on the objective data described below, the patient presents with normal strength, full AROM, steady dynamic standing balance and transfers s/p admission for syncope and collapse. Patient is fully indep at baseline without DME, is active. Today, exhibits good mobility but unable to progress beyond quick stand due to hypertension, systolic 569 supine, increased to 226/92 in standing, asymptomatic, returned to bed and alerted RN. Anticipate patient will not have any therapy needs once can evaluate gait but will return when medically appropriate to do so. Current Level of Function Impacting Discharge (mobility/balance): indep bed mob and transfers    Functional Outcome Measure: The patient scored Total: 75/100 on the Barthel Index which is indicative of 25% impaired ability to care for basic self needs/dependency on others. Other factors to consider for discharge:      Patient will benefit from skilled therapy intervention to address the above noted impairments.        PLAN :  Recommendations and Planned Interventions: transfer training, gait training, therapeutic exercises, neuromuscular re-education, patient and family training/education, and therapeutic activities      Frequency/Duration: Patient will be followed by physical therapy:  3 times a week to address goals. Recommendation for discharge: (in order for the patient to meet his/her long term goals)  No skilled physical therapy/ follow up rehabilitation needs identified at this time. This discharge recommendation:  A follow-up discussion with the attending provider and/or case management is planned    IF patient discharges home will need the following DME: none         SUBJECTIVE:   Patient stated I don't think I need any therapy.     OBJECTIVE DATA SUMMARY:   HISTORY:    Past Medical History:   Diagnosis Date    Arthritis     Asthma     Diabetes mellitus, type 2 (HCC)     GERD (gastroesophageal reflux disease)     Hyperlipidemia     Hypertension     Thyroid cancer ([facility] [address]      Past Surgical History:   Procedure Laterality Date    HX APPENDECTOMY      HX  SECTION      HX DILATION AND CURETTAGE      HX THYROIDECTOMY      HX TOTAL ABDOMINAL HYSTERECTOMY         Personal factors and/or comorbidities impacting plan of care:     Home Situation  Home Environment: (P) Private residence  # Steps to Enter: (P) 5  Rails to Enter: (P) Yes  Hand Rails : (P) Right  One/Two Story Residence: (P) One story  Living Alone: (P) No  Support Systems: (P) Spouse/Significant Other/Partner, Family member(s), Child(demetria)  Patient Expects to be Discharged to[de-identified] (P) Private residence  Current DME Used/Available at Home: (P) [address], rollator, Shower chair  Tub or Shower Type: (P) Shower    EXAMINATION/PRESENTATION/DECISION MAKING:   Critical Behavior:  Neurologic State: (P) Alert  Orientation Level: (P) Oriented X4        Hearing: Auditory  Auditory Impairment: None    Range Of Motion:  AROM: Within functional limits           PROM: Within functional limits           Strength:    Strength:  Within functional limits                    Tone & Sensation:   Tone: Normal              Sensation: Intact Coordination:  Coordination: Within functional limits  Vision:      Functional Mobility:  Bed Mobility:  Rolling: Independent  Supine to Sit: Independent  Sit to Supine: Independent  Scooting: Independent  Transfers:  Sit to Stand: Independent  Stand to Sit: Independent                       Balance:   Sitting: Intact  Standing: Intact  Ambulation/Gait Training:                                                         Functional Measure:  Barthel Index:    Bathin  Bladder: 10  Bowels: 10  Groomin  Dressing: 10  Feeding: 10  Mobility: 0  Stairs: 0  Toilet Use: 10 (inferred from patient/RN report)  Transfer (Bed to Chair and Back): 15  Total: 75/100       The Barthel ADL Index: Guidelines  1. The index should be used as a record of what a patient does, not as a record of what a patient could do. 2. The main aim is to establish degree of independence from any help, physical or verbal, however minor and for whatever reason. 3. The need for supervision renders the patient not independent. 4. A patient's performance should be established using the best available evidence. Asking the patient, friends/relatives and nurses are the usual sources, but direct observation and common sense are also important. However direct testing is not needed. 5. Usually the patient's performance over the preceding 24-48 hours is important, but occasionally longer periods will be relevant. 6. Middle categories imply that the patient supplies over 50 per cent of the effort. 7. Use of aids to be independent is allowed. [place]., Barthel, D.W. (3434). Functional evaluation: the Barthel Index. [address] [facility] (14)2. DAVID Mcclain, Keith Talbot., Manuel Coburn, [place], [address] [place] [address] (). Measuring the change indisability after inpatient rehabilitation; comparison of the responsiveness of the Barthel Index and Functional [place] Measure. Journal of Neurology, Neurosurgery, and Psychiatry, 66(4), 911-336.   Errol Santos MILA, MARYBETH Larson, & Dewey Thurman M.A. (2004.) Assessment of post-stroke quality of life in cost-effectiveness studies: The usefulness of the Barthel Index and the EuroQoL-5D. Quality of Life Research, 15, 888-57        Physical Therapy Evaluation Charge Determination   History Examination Presentation Decision-Making   MEDIUM  Complexity : 1-2 comorbidities / personal factors will impact the outcome/ POC  LOW Complexity : 1-2 Standardized tests and measures addressing body structure, function, activity limitation and / or participation in recreation  MEDIUM Complexity : Evolving with changing characteristics  Other outcome measures Barthel 75  LOW       Based on the above components, the patient evaluation is determined to be of the following complexity level: LOW     Pain Rating:  None    Activity Tolerance:   Fair and limited by hypertension    After treatment patient left in no apparent distress:   Supine in bed, Call bell within reach, and Caregiver / family present    COMMUNICATION/EDUCATION:   The patients plan of care was discussed with: Registered nurse. Fall prevention education was provided and the patient/caregiver indicated understanding. and Patient/family agree to work toward stated goals and plan of care.     Thank you for this referral.  Rosemarie Orozco, PT   Time Calculation: 20 mins

## 2021-05-27 NOTE — ACP (ADVANCE CARE PLANNING)
Advance Care Planning   Advance Care Planning Inpatient Note  [address] [facility]    Today's Date: 5/27/2021  Unit: [facility]  MED SURG 2    Received request from . Upon review of chart and communication with care team, patient's decision making abilities are not in question. Patient and Staff was/were present in the room during visit. Unable to visit at this time due to staff working with patient. Goals of ACP Conversation:  Discuss Advance Care planning documents    Health Care Decision Makers:      Click here to complete [address] including selection of the Healthcare Decision Maker Relationship (ie \"Primary\")         Advance Care Planning Documents (Patient Wishes) on file:  None     Assessment:    Responded to  request for an Advance Medical Directive consult. Unable to visit due to staff working with patient. Interventions:  None     Outcomes/Plan:  Left AMD for and Your Right to Decide booklet with patient's nurse to give to patient. Advised nurse to contact [facility] for any further referrals.       Electronically signed by Edgardo Montgomery [facility] on 5/27/2021 at 12:04 PM

## 2021-05-27 NOTE — PROGRESS NOTES
Daily Progress Note: 5/27/2021  Saeed Wells MD    Assessment/Plan:   Near Syncope and collapse: unclear etiology. Could be due to fluctuating blood sugar, uncontrolled HTN, dehydration, mild ESTRELLA.      - Check Echo. - Start IVF  - Cards consult     Chest pain at rest/shoulder pain: unclear etiology. No evidence of ACS. Chest CT unremarkable. - Cont ASA. - Use IV morphine prn severe pain. - Consult Cards     ESTRELLA: mild, likely dehydration.    - holding ACEi.    - Start IVF, improvement this am     Hypertension: uncontrolled. - Hold ACEi.    - Use IV hydralazine prn     DM type 2 (diabetes mellitus, type 2):   - check A1C.    - Hold oral agents. - Start SSI     Asthma:   - cont singulair.    - Use nebs prn     Hypothyroid  - Continue Levothyroxine and Cytomel     Problem List:  Problem List as of 5/27/2021 Date Reviewed: 5/26/2021        Codes Class Noted - Resolved    Syncope and collapse ICD-10-CM: R55  ICD-9-CM: 780.2  5/26/2021 - Present        Chest pain at rest ICD-10-CM: R07.9  ICD-9-CM: 786.50  5/26/2021 - Present        Hypertension (Chronic) ICD-10-CM: I10  ICD-9-CM: 401.9  5/26/2021 - Present        DM type 2 (diabetes mellitus, type 2) ([facility] ICD-10-CM: E11.9  ICD-9-CM: 250.00  5/26/2021 - Present              HPI:   The patient is a 65 yo hx of HTN, DM, asthma, presented w/ near syncope, chest pain. The patient c/o chest and upper back pain tonight, associated with a near syncopal event. She also c/o dizziness, diaphoresis, and \"not feeling well. \"  The patient recently received a prednisone taper for a sinus infection. She stated that her blood sugar has been elevated due to steroids. In the ED, glucose was 236, Cr 1.48. SBP ~180s. Chest CTA neg for dissection or PE. (Dr Dupree Sis)    5/27: She is feeling better this am. No further syncopal episodes. Cards c/s and ECHO pending. Creatinine has improved with fluids.      Review of Systems:   A comprehensive review of systems was negative except for that written in the HPI. Objective:   Physical Exam:     Visit Vitals  BP (!) 176/74 (BP 1 Location: Right arm, BP Patient Position: At rest)   Pulse (!) 55   Temp 97.4 °F (36.3 °C)   Resp 18   Ht 5' 6\" (1.676 m)   Wt 160 lb (72.6 kg)   SpO2 97%   BMI 25.82 kg/m²      O2 Device: None    Temp (24hrs), Av.8 °F (36.6 °C), Min:97.4 °F (36.3 °C), Max:98.2 °F (36.8 °C)    1901 -  0700  In: .5 [I.V.:.]  Out: 0    No intake/output data recorded. General:  Alert, cooperative, no distress, appears stated age. Head:  Normocephalic, without obvious abnormality, atraumatic. Eyes:  Conjunctivae/corneas clear. PERRL, EOMs intact. Nose: Nares normal. Septum midline. Mucosa normal. No drainage or sinus tenderness. Throat: Lips, mucosa, and tongue normal. Teeth and gums normal.   Neck: Supple, symmetrical, trachea midline, no adenopathy, thyroid: no enlargement/tenderness/nodules, no carotid bruit and no JVD. Back:   Symmetric, no curvature. ROM normal. No CVA tenderness. Lungs:   Clear to auscultation bilaterally. Chest wall:  No tenderness or deformity. Heart:  Regular rate and rhythm, S1, S2 normal, no murmur, click, rub or gallop. Abdomen:   Soft, non-tender. Bowel sounds normal. No masses,  No organomegaly. Extremities: Extremities normal, atraumatic, no cyanosis or edema. No calf tenderness or cords. Pulses: 2+ and symmetric all extremities. Skin: Skin color, texture, turgor normal. No rashes or lesions   Neurologic: CNII-XII intact. Alert and oriented X 3. Fine motor of hands and fingers normal.   equal.  No cogwheeling or rigidity. Gait not tested at this time. Sensation grossly normal to touch. Gross motor of extremities normal.       Data Review:   CTA Chest 21  FINDINGS:  CHEST:  THYROID: Surgically absent. MEDIASTINUM: No mass or lymphadenopathy. JACKELYN: No mass or lymphadenopathy.   THORACIC AORTA: No dissection or aneurysm. MAIN PULMONARY ARTERY: There is no evidence of pulmonary embolism. TRACHEA/BRONCHI: Patent. ESOPHAGUS: No wall thickening or dilatation. HEART: Normal in size. PLEURA: No effusion or pneumothorax. LUNGS: Emphysematous changes are noted. No acute abnormality is identified. INCIDENTALLY IMAGED UPPER ABDOMEN: Small hiatal hernia. Tiny gallstone. BONES: Degenerative changes are seen in the thoracic spine.        IMPRESSION  No acute process or evidence of pulmonary embolism. Emphysematous  changes. Recent Days:  Recent Labs     05/27/21  0520 05/26/21 1909   WBC 5.6 7.4   HGB 10.9* 12.2   HCT 33.6* 36.2    267     Recent Labs     05/27/21 0520 05/26/21 1909    137   K 4.5 4.8   * 103   CO2 24 25   * 236*   BUN 20 29*   CREA 0.98 1.48*   CA 8.4* 8.9   MG 2.1  --    PHOS 2.8  --    ALB 3.0* 3.4*   TBILI 0.5 0.7   ALT 22 29     No results for input(s): PH, PCO2, PO2, HCO3, FIO2 in the last 72 hours. 24 Hour Results:  Recent Results (from the past 24 hour(s))   CBC WITH AUTOMATED DIFF    Collection Time: 05/26/21  7:09 PM   Result Value Ref Range    WBC 7.4 3.6 - 11.0 K/uL    RBC 4.04 3.80 - 5.20 M/uL    HGB 12.2 11.5 - 16.0 g/dL    HCT 36.2 35.0 - 47.0 %    MCV 89.6 80.0 - 99.0 FL    MCH 30.2 26.0 - 34.0 PG    MCHC 33.7 30.0 - 36.5 g/dL    RDW 12.9 11.5 - 14.5 %    PLATELET 569 705 - 087 K/uL    MPV 11.0 8.9 - 12.9 FL    NRBC 0.0 0.0  WBC    ABSOLUTE NRBC 0.00 0.00 - 0.01 K/uL    NEUTROPHILS 69 32 - 75 %    LYMPHOCYTES 19 12 - 49 %    MONOCYTES 8 5 - 13 %    EOSINOPHILS 3 0 - 7 %    BASOPHILS 1 0 - 1 %    IMMATURE GRANULOCYTES 0 0 - 0.5 %    ABS. NEUTROPHILS 5.2 1.8 - 8.0 K/UL    ABS. LYMPHOCYTES 1.4 0.8 - 3.5 K/UL    ABS. MONOCYTES 0.6 0.0 - 1.0 K/UL    ABS. EOSINOPHILS 0.2 0.0 - 0.4 K/UL    ABS. BASOPHILS 0.1 0.0 - 0.1 K/UL    ABS. IMM.  GRANS. 0.0 0.00 - 0.04 K/UL    DF AUTOMATED     METABOLIC PANEL, COMPREHENSIVE    Collection Time: 05/26/21  7:09 PM   Result Value Ref Range    Sodium 137 136 - 145 mmol/L    Potassium 4.8 3.5 - 5.1 mmol/L    Chloride 103 97 - 108 mmol/L    CO2 25 21 - 32 mmol/L    Anion gap 9 5 - 15 mmol/L    Glucose 236 (H) 65 - 100 mg/dL    BUN 29 (H) 6 - 20 MG/DL    Creatinine 1.48 (H) 0.55 - 1.02 MG/DL    BUN/Creatinine ratio 20 12 - 20      GFR est AA 41 (L) >60 ml/min/1.73m2    GFR est non-AA 34 (L) >60 ml/min/1.73m2    Calcium 8.9 8.5 - 10.1 MG/DL    Bilirubin, total 0.7 0.2 - 1.0 MG/DL    ALT (SGPT) 29 12 - 78 U/L    AST (SGOT) 25 15 - 37 U/L    Alk. phosphatase 74 45 - 117 U/L    Protein, total 6.5 6.4 - 8.2 g/dL    Albumin 3.4 (L) 3.5 - 5.0 g/dL    Globulin 3.1 2.0 - 4.0 g/dL    A-G Ratio 1.1 1.1 - 2.2     NT-PRO BNP    Collection Time: 05/26/21  7:09 PM   Result Value Ref Range    NT pro- 0 - 450 PG/ML   TROPONIN I    Collection Time: 05/26/21  7:09 PM   Result Value Ref Range    Troponin-I, Qt. <0.05 <0.05 ng/mL   SAMPLES BEING HELD    Collection Time: 05/26/21  7:09 PM   Result Value Ref Range    SAMPLES BEING HELD 1 RED 1 SST 1 PST     COMMENT        Add-on orders for these samples will be processed based on acceptable specimen integrity and analyte stability, which may vary by analyte.    TSH 3RD GENERATION    Collection Time: 05/26/21  7:09 PM   Result Value Ref Range    TSH 1.01 0.36 - 3.74 uIU/mL   GLUCOSE, POC    Collection Time: 05/26/21  7:23 PM   Result Value Ref Range    Glucose (POC) 223 (H) 65 - 117 mg/dL    Performed by SafariDeskdottie Trenerginarayan    D DIMER    Collection Time: 05/26/21  8:56 PM   Result Value Ref Range    D-dimer 0.90 (H) 0.00 - 0.65 mg/L FEU   EKG, 12 LEAD, SUBSEQUENT    Collection Time: 05/26/21  9:17 PM   Result Value Ref Range    Ventricular Rate 62 BPM    Atrial Rate 62 BPM    P-R Interval 170 ms    QRS Duration 78 ms    Q-T Interval 436 ms    QTC Calculation (Bezet) 442 ms    Calculated P Axis 28 degrees    Calculated R Axis -18 degrees    Calculated T Axis 65 degrees    Diagnosis       Normal sinus rhythm  Normal ECG  When compared with ECG of 26-MAY-2021 18:41,  MANUAL COMPARISON REQUIRED, DATA IS UNCONFIRMED     GLUCOSE, POC    Collection Time: 05/27/21  1:08 AM   Result Value Ref Range    Glucose (POC) 144 (H) 65 - 117 mg/dL    Performed by Homer March    TROPONIN I    Collection Time: 05/27/21  5:20 AM   Result Value Ref Range    Troponin-I, Qt. <0.05 <8.58 ng/mL   METABOLIC PANEL, BASIC    Collection Time: 05/27/21  5:20 AM   Result Value Ref Range    Sodium 140 136 - 145 mmol/L    Potassium 4.5 3.5 - 5.1 mmol/L    Chloride 110 (H) 97 - 108 mmol/L    CO2 24 21 - 32 mmol/L    Anion gap 6 5 - 15 mmol/L    Glucose 188 (H) 65 - 100 mg/dL    BUN 20 6 - 20 MG/DL    Creatinine 0.98 0.55 - 1.02 MG/DL    BUN/Creatinine ratio 20 12 - 20      GFR est AA >60 >60 ml/min/1.73m2    GFR est non-AA 55 (L) >60 ml/min/1.73m2    Calcium 8.4 (L) 8.5 - 10.1 MG/DL   HEPATIC FUNCTION PANEL    Collection Time: 05/27/21  5:20 AM   Result Value Ref Range    Protein, total 5.6 (L) 6.4 - 8.2 g/dL    Albumin 3.0 (L) 3.5 - 5.0 g/dL    Globulin 2.6 2.0 - 4.0 g/dL    A-G Ratio 1.2 1.1 - 2.2      Bilirubin, total 0.5 0.2 - 1.0 MG/DL    Bilirubin, direct 0.1 0.0 - 0.2 MG/DL    Alk.  phosphatase 62 45 - 117 U/L    AST (SGOT) 14 (L) 15 - 37 U/L    ALT (SGPT) 22 12 - 78 U/L   MAGNESIUM    Collection Time: 05/27/21  5:20 AM   Result Value Ref Range    Magnesium 2.1 1.6 - 2.4 mg/dL   CBC W/O DIFF    Collection Time: 05/27/21  5:20 AM   Result Value Ref Range    WBC 5.6 3.6 - 11.0 K/uL    RBC 3.63 (L) 3.80 - 5.20 M/uL    HGB 10.9 (L) 11.5 - 16.0 g/dL    HCT 33.6 (L) 35.0 - 47.0 %    MCV 92.6 80.0 - 99.0 FL    MCH 30.0 26.0 - 34.0 PG    MCHC 32.4 30.0 - 36.5 g/dL    RDW 13.1 11.5 - 14.5 %    PLATELET 036 695 - 804 K/uL    MPV 10.4 8.9 - 12.9 FL    NRBC 0.0 0  WBC    ABSOLUTE NRBC 0.00 0.00 - 0.01 K/uL   PHOSPHORUS    Collection Time: 05/27/21  5:20 AM   Result Value Ref Range    Phosphorus 2.8 2.6 - 4.7 MG/DL   GLUCOSE, POC    Collection Time: 05/27/21  6:28 AM   Result Value Ref Range    Glucose (POC) 156 (H) 65 - 117 mg/dL    Performed by Oliva Whitehead (PCT)        Medications reviewed  Current Facility-Administered Medications   Medication Dose Route Frequency    aspirin chewable tablet 81 mg  81 mg Oral DAILY    liothyronine (CYTOMEL) tablet 5 mcg  5 mcg Oral DAILY    montelukast (SINGULAIR) tablet 10 mg  10 mg Oral DAILY    levothyroxine (SYNTHROID) tablet 175 mcg  175 mcg Oral ACB    ezetimibe (ZETIA) tablet 10 mg  10 mg Oral DAILY    0.9% sodium chloride infusion  75 mL/hr IntraVENous CONTINUOUS    sodium chloride (NS) flush 5-40 mL  5-40 mL IntraVENous Q8H    sodium chloride (NS) flush 5-40 mL  5-40 mL IntraVENous PRN    0.9% sodium chloride infusion 25 mL  25 mL IntraVENous PRN    acetaminophen (TYLENOL) tablet 650 mg  650 mg Oral Q6H PRN    Or    acetaminophen (TYLENOL) suppository 650 mg  650 mg Rectal Q6H PRN    bisacodyL (DULCOLAX) suppository 10 mg  10 mg Rectal DAILY PRN    promethazine (PHENERGAN) tablet 12.5 mg  12.5 mg Oral Q6H PRN    Or    ondansetron (ZOFRAN) injection 4 mg  4 mg IntraVENous Q6H PRN    enoxaparin (LOVENOX) injection 40 mg  40 mg SubCUTAneous DAILY    albuterol-ipratropium (DUO-NEB) 2.5 MG-0.5 MG/3 ML  3 mL Nebulization Q4H PRN    insulin regular (NOVOLIN R, HUMULIN R) injection   SubCUTAneous AC&HS    glucose chewable tablet 16 g  4 Tablet Oral PRN    dextrose (D50W) injection syrg 12.5-25 g  12.5-25 g IntraVENous PRN    glucagon (GLUCAGEN) injection 1 mg  1 mg IntraMUSCular PRN    hydrALAZINE (APRESOLINE) 20 mg/mL injection 20 mg  20 mg IntraVENous Q6H PRN    morphine injection 1 mg  1 mg IntraVENous Q4H PRN       Care Plan discussed with: Patient/Family    Total time spent with patient and review of records: 30 minutes.     Mercy Corcoran MD

## 2021-05-27 NOTE — H&P
[facility] [place] [address]  [address] [facility], [address] [place] [address]  [phone]    Admission History and Physical      NAME:  Gris Snider   :   1945   MRN:  [id number]     PCP:  Rober Finley MD     Date/Time of service:  2021  11:45 PM        Subjective:     CHIEF COMPLAINT: weakness     HISTORY OF PRESENT ILLNESS:     The patient is a 63 yo hx of HTN, DM, asthma, presented w/ near syncope, chest pain. The patient c/o chest and upper back pain tonight, associated with a near syncopal event. She also c/o dizziness, diaphoresis, and \"not feeling well. \"  The patient recently received a prednisone taper for a sinus infection. She stated that her blood sugar has been elevated due to steroids. In the ED, glucose was 236, Cr 1.48. SBP ~180s. Chest CTA neg for dissection or PE. Allergies   Allergen Reactions    Adhesive Rash    Ciprofloxacin-Dexamethasone Other (comments)     Headache    Codeine Nausea Only    Diflucan [Fluconazole] Other (comments)     Severe Headache    Statins-Hmg-Coa Reductase Inhibitors Myalgia    Sulfa (Sulfonamide Antibiotics) Rash       Prior to Admission medications    Medication Sig Start Date End Date Taking? Authorizing Provider   icosapent ethyL (Vascepa) 1 gram capsule  21  Yes Other, MD Quyen   montelukast (SINGULAIR) 10 mg tablet Take 10 mg by mouth daily. 3/16/21  Yes Other, MD Quyen   nateglinide (STARLIX) 120 mg tablet  21  Yes Other, MD Quyen   SYNTHROID 175 mcg tablet  1/26/15  Yes Provider, Historical   NEXIUM 40 mg capsule  14  Yes Provider, Historical   quinapril (ACCUPRIL) 10 mg tablet  14  Yes Provider, Historical   ZETIA 10 mg tablet  11/15/14  Yes Provider, Historical   nabumetone (RELAFEN) 500 mg tablet  12/15/14  Yes Provider, Historical   aspirin 81 mg chewable tablet Take 81 mg by mouth daily.    Yes Provider, Historical   levalbuterol tartrate (XOPENEX HFA) 45 mcg/actuation inhaler Take  by inhalation. Yes Provider, Historical   ranitidine (ZANTAC) 150 mg tablet  14   Provider, Historical   liothyronine (CYTOMEL) 5 mcg tablet  2/10/15   Provider, Historical   metFORMIN (GLUCOPHAGE) 500 mg tablet  2/3/15   Provider, Historical   WELCHOL 625 mg tablet  1/7/15   Provider, Historical   quiNINE 324 mg capsule  14   Provider, Historical   SPIRIVA WITH HANDIHALER 18 mcg inhalation capsule  1/28/15   Provider, Historical   PREMARIN 0.3 mg tablet  1/22/15   Provider, Historical   cyanocobalamin 1,000 mcg tablet Take 1,000 mcg by mouth daily. Provider, Historical   budesonide-formoterol (SYMBICORT) 160-4.5 mcg/actuation HFA inhaler Take 2 Puffs by inhalation two (2) times a day. Patient not taking: Reported on 2021    Provider, Historical   clindamycin (CLEOCIN) 2 % vaginal cream Insert 1 Applicator into vagina nightly. For 2 weeks  Patient not taking: Reported on 2021 2/11/15   Katalina Sosa MD   calcium 500 mg tab Take  by mouth. Provider, Historical   omega-3 fatty acids-vitamin e (FISH OIL) 1,000 mg cap Take 1 Cap by mouth.   Patient not taking: Reported on 2021    Provider, Historical       Past Medical History:   Diagnosis Date    Arthritis     Asthma     [facility]     GERD (gastroesophageal reflux disease)     Hyperlipidemia     Hypertension     Thyroid cancer ([facility]         Past Surgical History:   Procedure Laterality Date    HX APPENDECTOMY      HX  SECTION      HX DILATION AND CURETTAGE      HX THYROIDECTOMY      HX TOTAL ABDOMINAL HYSTERECTOMY         Social History     Tobacco Use    Smoking status: Former Smoker    Smokeless tobacco: Never Used   Substance Use Topics    Alcohol use: No        Family History   Problem Relation Age of Onset    Asthma Father     Hypertension Father     Hypertension Mother     Stroke Mother     Cancer Sister         thyroid        Review of Systems:  (bold if positive, if negative)    Gen:   fatigueEyes:  ENT:  CVS:  , chest pain, Pulm:  GI:  :  MS:  Skin:  Psych:  Endo:  Hem:  Renal:  Neuro:          Objective:      VITALS:    Vital signs reviewed; most recent are:    Visit Vitals  BP (!) 154/52   Pulse 70   Temp 97.7 °F (36.5 °C)   Resp 20   Ht 5' 6\" (1.676 m)   Wt 72.6 kg (160 lb)   SpO2 95%   BMI 25.82 kg/m²     SpO2 Readings from Last 6 Encounters:   05/26/21 95%            Intake/Output Summary (Last 24 hours) at 5/26/2021 2345  Last data filed at 5/26/2021 2224  Gross per 24 hour   Intake 1000 ml   Output    Net 1000 ml        Exam:     Physical Exam:    Gen:  Elderly, frail, NAD  HEENT:  Pink conjunctivae, PERRL, hearing intact to voice, moist mucous membranes  Neck:  Supple, without masses, thyroid non-tender  Resp:  No accessory muscle use, clear breath sounds without wheezes rales or rhonchi  Card:  No murmurs, normal S1, S2 without thrills, bruits or peripheral edema  Abd:  Soft, non-tender, non-distended, normoactive bowel sounds are present  Lymph:  No cervical adenopathy  Musc:  No cyanosis or clubbing  Skin:  No rashes  Neuro:  Cranial nerves 3-12 are grossly intact, follows commands appropriately  Psych:  Alert with good insight. Oriented to person, place, and time    Labs:    Recent Labs     05/26/21  1909   WBC 7.4   HGB 12.2   HCT 36.2        Recent Labs     05/26/21  1909      K 4.8      CO2 25   *   BUN 29*   CREA 1.48*   CA 8.9   ALB 3.4*   TBILI 0.7   ALT 29     Lab Results   Component Value Date/Time    Glucose (POC) 223 (H) 05/26/2021 07:23 PM     No results for input(s): PH, PCO2, PO2, HCO3, FIO2 in the last 72 hours. No results for input(s): INR, INREXT in the last 72 hours.     Radiology and EKG reviewed:   Chest CTA reviewed    **Old Records reviewed in [facility]**       Assessment/Plan:       Active Problems:    65 yo hx of HTN, DM, asthma, presented w/ near syncope, chest pain    1) Near Syncope and collapse: unclear etiology. Could be due to fluctuating blood sugar, uncontrolled HTN, dehydration, mild ESTRELLA. Will monitor on Tele. Check Echo. Start IVF    2) Chest pain at rest/shoulder pain: unclear etiology. No evidence of ACS. Chest CT unremarkable. Will monitor on Tele. Cont ASA. Use IV morphine prn severe pain. Consult Cards    3) ESTRELLA: mild, likely dehydration. Will hold ACEi. Start IVF, monitor BMP    4) Hypertension: uncontrolled. Hold ACEi. Use IV hydralazine prn    5) DM type 2 (diabetes mellitus, type 2): check A1C. Hold oral agents. Start SSI    6) Asthma: cont singulair. Use nebs prn    Risk of deterioration: high      Total time spent with patient: 79 Minutes **I personally saw and examined the patient during this time period**                 Care Plan discussed with: Patient, nursing.   Will sign out to Dr. Sunshine Jimenez team    Discussed:  Care Plan    Prophylaxis:  Lovenox    Probable Disposition:  Home w/Family           ___________________________________________________    Attending Physician: Jeremy Calabrese MD

## 2021-05-27 NOTE — PROGRESS NOTES
Bedside shift change report given to Henok Aguila RN (oncoming nurse) by ADALBERTO CASTELLON (offgoing nurse). Report included the following information SBAR, Kardex, Intake/Output, MAR, Accordion, Recent Results and Quality Measures.

## 2021-05-27 NOTE — PROGRESS NOTES
CMS Note  5/27/2021    Patient received and signed the Observation letter. Patient was provided with a copy for their record.   Kalpana Jenkins CMS

## 2021-05-27 NOTE — ED NOTES
Patient is admitted to [facility], 5th floor, attempted to call report without any success.

## 2021-05-27 NOTE — ED NOTES
TRANSFER - OUT REPORT:    Verbal report given to Elin Wong RN) on Delonte Soler  being transferred to%th floor [facility]) for routine progression of care       Report consisted of patients Situation, Background, Assessment and   Recommendations(SBAR). Information from the following report(s) ED Summary, MAR, Recent Results and Cardiac Rhythm nsr was reviewed with the receiving nurse. Lines:   Peripheral IV 05/26/21 Left Antecubital (Active)   Site Assessment Clean, dry, & intact 05/26/21 2059   Phlebitis Assessment 0 05/26/21 2059   Infiltration Assessment 0 05/26/21 2059   Dressing Status Clean, dry, & intact; New 05/26/21 2059   Dressing Type Tape;Transparent 05/26/21 2059   Hub Color/Line Status Blue;Flushed;Patent 05/26/21 2059   Action Taken Blood drawn 05/26/21 2059        Opportunity for questions and clarification was provided.       Patient transported with:   Monitor

## 2021-05-27 NOTE — ED NOTES
Patient transported to [facility] via [facility] ambulance with portable monitor. Remains  Awake  And alert, no signs of distress. Daughter accompanied patient.

## 2021-05-27 NOTE — PROGRESS NOTES
Admission Medication Reconciliation:     Information obtained from:    Patient via interview in [phone]  Medication list provided by the patient. A copy has been placed on the paper chart to be scanned into the EMR. RxQuery data available¹:  No    Comments/Recommendations:   Patient able to confirm name, , allergies, and preferred pharmacy  Updated PTA medication list  The patient's medication list was reviewed in detail with the patient. It was found not to be up to date for all medications. The patient was an excellent historian as to her medications. ¹RxQuery pharmacy benefit data reflects medications filled and processed through the patient's insurance, however   this data does NOT capture whether the medication was picked up or is currently being taken by the patient. Prior to Admission Medications   Prescriptions Last Dose Informant Taking? Saccharomyces boulardii (Florastor) 250 mg capsule 2021 at Unknown time Self Yes   Sig: Take 250 mg by mouth two (2) times a day. ZETIA 10 mg tablet 2021 at am Self Yes   Sig: Take 10 mg by mouth daily. amLODIPine (NORVASC) 2.5 mg tablet 2021 at Unknown time Self Yes   Sig: Take 2.5 mg by mouth daily. aspirin 81 mg chewable tablet 2021 at am Self Yes   Sig: Take 81 mg by mouth daily. cholecalciferol (Vitamin D3) (1000 Units /25 mcg) tablet 2021 at Unknown time Self Yes   Sig: Take 1,000 Units by mouth daily. cyanocobalamin, vitamin B-12, 5,000 mcg cap 2021 at Unknown time Self Yes   Sig: Take 5,000 mcg by mouth daily. ferrous sulfate 325 mg (65 mg iron) tablet 2021 at Unknown time Self Yes   Sig: Take 325 mg by mouth Daily (before breakfast). icosapent ethyL (Vascepa) 1 gram capsule 2021 at am Self Yes   Sig: Take 2 Capsules by mouth two (2) times daily (with meals).    levalbuterol tartrate (XOPENEX HFA) 45 mcg/actuation inhaler  Self Yes   Sig: Take 2 Puffs by inhalation every four (4) hours as needed for Shortness of Breath. levothyroxine (Synthroid) 100 mcg tablet 5/26/2021 at am Self Yes   Sig: Take 100 mcg by mouth Daily (before breakfast). liothyronine (CYTOMEL) 25 mcg tablet 5/26/2021 at Unknown time Self Yes   Sig: Take 12.5 mcg by mouth Daily (before breakfast). magnesium oxide 500 mg tab  Self Yes   Sig: Take 500 mg by mouth daily. montelukast (SINGULAIR) 10 mg tablet 5/26/2021 at am Self Yes   Sig: Take 10 mg by mouth daily. nabumetone (RELAFEN) 500 mg tablet 5/26/2021 at am Self Yes   Sig: Take 1,000 mg by mouth two (2) times a day. nateglinide (STARLIX) 120 mg tablet 5/26/2021 at am Self Yes   Sig: Take 120 mg by mouth Before breakfast, lunch, and dinner. quinapril (ACCUPRIL) 10 mg tablet 5/26/2021 at am Self Yes   Sig: Take 20 mg by mouth two (2) times a day. umeclidinium (Incruse Ellipta) 62.5 mcg/actuation inhaler 5/26/2021 at Unknown time Self Yes   Sig: Take 1 Puff by inhalation daily. Facility-Administered Medications: None         Please contact the main inpatient pharmacy with any questions or concerns at [phone] and we will direct you to the clinical pharmacist covering this patient's care while in-house.    Teresa Martini, LenoreD, BCPS

## 2021-05-27 NOTE — ED NOTES
Perfect Serve Consult for Admission  10:56 PM    ED Room Number: VMN42/36  Patient Name and age:  Luz Elena Crespo 64 y.o.  female  Working Diagnosis:   1. Near syncope    2. Hyperglycemia    3. Acute bilateral thoracic back pain        COVID-19 Suspicion:  no  Sepsis present:  no  Reassessment needed: no  Code Status:  Full Code  Readmission: no  Isolation Requirements:  no  Recommended Level of Care:  telemetry  Department:[place] [facility] - [phone]  Other: Patient is a 64-year-old female with past medical history significant for asthma, type 2 diabetes, hypertension who presents emergency department with a near syncopal event. She states she was making dinner when she had sudden onset of pain in her upper back with radiation to bilateral shoulders. This was associated with near syncope. Patient was also treated with prednisone 2 to 3 weeks ago for sinus infection and sugars have been elevated since. . Patient has noted profound fatigue today as well. EKG unremarkable and CTA negative for PE or dissection. Due to risk factors seems most prudent to bring patient in for further observation for syncope work-up and cardiac rule out.

## 2021-05-27 NOTE — PROGRESS NOTES
Problem: Self Care Deficits Care Plan (Adult)  Goal: *Acute Goals and Plan of Care (Insert Text)  Description:   FUNCTIONAL STATUS PRIOR TO ADMISSION: Patient was independent and active without use of DME.     HOME SUPPORT: The patient lived with her  but did not require assist.    Occupational Therapy Goals  Initiated 5/27/2021  1. Patient will perform grooming with independence in standing within 7 day(s). 2.  Patient will perform lower body dressing with independence within 7 day(s). 3.  Patient will perform toilet transfers with independence within 7 day(s). 4.  Patient will perform all aspects of toileting with independence within 7 day(s). 5.  Patient will participate in upper extremity therapeutic exercise/activities with independence for 5 minutes within 7 day(s). 6.  Patient will utilize energy conservation techniques during functional activities with verbal cues within 7 day(s). Outcome: Progressing Towards Goal  OCCUPATIONAL THERAPY EVALUATION  Patient: Marisol Nava (28 y.o. female)  Date: 5/27/2021  Primary Diagnosis: Syncope and collapse [R55]        Precautions:       ASSESSMENT  Based on the objective data described below, the patient presents with elevated BP and mildly decreased endurance following admission for syncope and collapse. At baseline pt lives with her  and is I with ADLs and mobility without AD. Pt presented semisupine in bed, agreeable to participate, daughter present. She performed bed mobility and sit<>stand with independence, no LOB observed. She also performed feeding ADLs independently and LB dressing ADL with setup. Further activity deferred and pt's BP found to be elevated, up to 226/92 in standing, pt asymptomatic. Pt in bed in modified chair position at end of session with needs met, nurse informed of pt's vitals. Pt is near her functional baseline at this time, anticipate she will not have further OT needs at discharge.     Current Level of Function Impacting Discharge (ADLs/self-care): independent bed mobility and sit<>stand, independent-CGA ADLs    Functional Outcome Measure: The patient scored 75/100 on the Barthel Index outcome measure. Other factors to consider for discharge: supportive family     Patient will benefit from skilled therapy intervention to address the above noted impairments. PLAN :  Recommendations and Planned Interventions: self care training, functional mobility training, therapeutic exercise, balance training, therapeutic activities, endurance activities, patient education, home safety training, and family training/education    Frequency/Duration: Patient will be followed by occupational therapy 3 times a week to address goals. Recommendation for discharge: (in order for the patient to meet his/her long term goals)  No skilled occupational therapy/ follow up rehabilitation needs identified at this time. This discharge recommendation:  Has not yet been discussed the attending provider and/or case management    IF patient discharges home will need the following DME: none       SUBJECTIVE:   Patient stated \"I live with my .     OBJECTIVE DATA SUMMARY:   HISTORY:   Past Medical History:   Diagnosis Date    Arthritis     Asthma     [facility]     GERD (gastroesophageal reflux disease)     Hyperlipidemia     Hypertension     [facility]      Past Surgical History:   Procedure Laterality Date    HX APPENDECTOMY      HX  SECTION      HX DILATION AND CURETTAGE      HX THYROIDECTOMY      HX TOTAL ABDOMINAL HYSTERECTOMY         Expanded or extensive additional review of patient history:     Home Situation  Home Environment: Private residence  # Steps to Enter: 5  Rails to Enter: Yes  Hand Rails : Right  One/Two Story Residence: One story  Living Alone: No  Support Systems: Spouse/Significant Other/Partner, Family member(s), Child(demetria)  Patient Expects to be Discharged to[de-identified] Private residence  Current DME Used/Available at Home: Tristian Gallagher, rollator, [address] [facility] chair  Tub or Shower Type: Shower    Hand dominance: Right    EXAMINATION OF PERFORMANCE DEFICITS:  Cognitive/Behavioral Status:  Neurologic State: Alert  Orientation Level: Oriented X4  Cognition: Follows commands  Perception: Appears intact  Perseveration: No perseveration noted  Safety/Judgement: Awareness of environment    Hearing: Auditory  Auditory Impairment: None    Vision/Perceptual:            Acuity: Within Defined Limits         Range of Motion:  AROM: Within functional limits  PROM: Within functional limits      Strength:  Strength: Within functional limits       Coordination:  Coordination: Within functional limits  Fine Motor Skills-Upper: Left Intact; Right Intact    Gross Motor Skills-Upper: Left Intact; Right Intact    Tone & Sensation:  Tone: Normal  Sensation: Intact      Balance:  Sitting: Intact  Standing: Intact    Functional Mobility and Transfers for ADLs:  Bed Mobility:  Rolling: Independent  Supine to Sit: Independent  Sit to Supine: Independent  Scooting: Independent    Transfers:  Sit to Stand: Independent  Stand to Sit: Independent    ADL Assessment:  Feeding: Independent    Oral Facial Hygiene/Grooming: Independent    Bathing: Supervision    Upper Body Dressing: Independent    Lower Body Dressing: Supervision;Setup    Toileting: Supervision        ADL Intervention and task modifications:  Feeding  Food to Mouth: Independent  Drink to Mouth:  Independent      Lower Body Dressing Assistance  Socks: Set-up  Leg Crossed Method Used: Yes  Position Performed: Seated edge of bed  Cues: Don         Cognitive Retraining  Safety/Judgement: Awareness of environment    Functional Measure:  Barthel Index:    Bathin  Bladder: 10  Bowels: 10  Groomin  Dressing: 10  Feeding: 10  Mobility: 0  Stairs: 0  Toilet Use: 10 (inferred from patient/RN report)  Transfer (Bed to Chair and Back): 15  Total: 75/100        The Barthel ADL Index: Guidelines  1. The index should be used as a record of what a patient does, not as a record of what a patient could do. 2. The main aim is to establish degree of independence from any help, physical or verbal, however minor and for whatever reason. 3. The need for supervision renders the patient not independent. 4. A patient's performance should be established using the best available evidence. Asking the patient, friends/relatives and nurses are the usual sources, but direct observation and common sense are also important. However direct testing is not needed. 5. Usually the patient's performance over the preceding 24-48 hours is important, but occasionally longer periods will be relevant. 6. Middle categories imply that the patient supplies over 50 per cent of the effort. 7. Use of aids to be independent is allowed. Osvaldo Lang., Barthel, DMichaelW. (0538). Functional evaluation: the Barthel Index. [address] [facility] (14)2. DAVID Jennings, Yasemin Yepez., Brent Ovalle, [place], [address] [place] [address] (1999). Measuring the change indisability after inpatient rehabilitation; comparison of the responsiveness of the Barthel Index and Functional [place] Measure. Journal of Neurology, Neurosurgery, and Psychiatry, 66(4), 670-376. Nicol Castaneda, N.J.A, MARYBETH Larson, & Uma Rodriguez M.A. (2004.) Assessment of post-stroke quality of life in cost-effectiveness studies: The usefulness of the Barthel Index and the EuroQoL-5D.  Quality of Life Research, 15, 603-44        Occupational Therapy Evaluation Charge Determination   History Examination Decision-Making   LOW Complexity : Brief history review  LOW Complexity : 1-3 performance deficits relating to physical, cognitive , or psychosocial skils that result in activity limitations and / or participation restrictions  LOW Complexity : No comorbidities that affect functional and no verbal or physical assistance needed to complete eval tasks       Based on the above components, the patient evaluation is determined to be of the following complexity level: LOW   Pain Rating:  Pt did not c/o pain    Activity Tolerance:   Good, but limited by elevated BP    After treatment patient left in no apparent distress:    Call bell within reach, Bed / chair alarm activated, Caregiver / family present, and Bed in modified chair position    COMMUNICATION/EDUCATION:   The patients plan of care was discussed with: Physical therapist and Registered nurse. Home safety education was provided and the patient/caregiver indicated understanding., Patient/family have participated as able in goal setting and plan of care. , and Patient/family agree to work toward stated goals and plan of care. This patients plan of care is appropriate for delegation to [facility].     Thank you for this referral.  Deysi Varela OT  Time Calculation: 28 mins

## 2021-05-28 ENCOUNTER — APPOINTMENT (OUTPATIENT)
Dept: NUCLEAR MEDICINE | Age: 76
End: 2021-05-28
Attending: SPECIALIST
Payer: MEDICARE

## 2021-05-28 ENCOUNTER — APPOINTMENT (OUTPATIENT)
Dept: NON INVASIVE DIAGNOSTICS | Age: 76
End: 2021-05-28
Attending: SPECIALIST
Payer: MEDICARE

## 2021-05-28 VITALS
RESPIRATION RATE: 16 BRPM | HEIGHT: 66 IN | HEART RATE: 68 BPM | DIASTOLIC BLOOD PRESSURE: 68 MMHG | OXYGEN SATURATION: 96 % | SYSTOLIC BLOOD PRESSURE: 145 MMHG | TEMPERATURE: 97.7 F | BODY MASS INDEX: 25.71 KG/M2 | WEIGHT: 160 LBS

## 2021-05-28 LAB
ALBUMIN SERPL-MCNC: 2.9 G/DL (ref 3.5–5)
ALBUMIN/GLOB SERPL: 1 {RATIO} (ref 1.1–2.2)
ALP SERPL-CCNC: 62 U/L (ref 45–117)
ALT SERPL-CCNC: 23 U/L (ref 12–78)
ANION GAP SERPL CALC-SCNC: 3 MMOL/L (ref 5–15)
AST SERPL-CCNC: 22 U/L (ref 15–37)
ATRIAL RATE: 75 BPM
BASOPHILS # BLD: 0.1 K/UL (ref 0–0.1)
BASOPHILS NFR BLD: 1 % (ref 0–1)
BILIRUB SERPL-MCNC: 0.4 MG/DL (ref 0.2–1)
BUN SERPL-MCNC: 14 MG/DL (ref 6–20)
BUN/CREAT SERPL: 17 (ref 12–20)
CALCIUM SERPL-MCNC: 8.7 MG/DL (ref 8.5–10.1)
CALCULATED P AXIS, ECG09: 40 DEGREES
CALCULATED R AXIS, ECG10: -19 DEGREES
CALCULATED T AXIS, ECG11: 62 DEGREES
CHLORIDE SERPL-SCNC: 111 MMOL/L (ref 97–108)
CO2 SERPL-SCNC: 26 MMOL/L (ref 21–32)
CREAT SERPL-MCNC: 0.81 MG/DL (ref 0.55–1.02)
DIAGNOSIS, 93000: NORMAL
DIFFERENTIAL METHOD BLD: NORMAL
EOSINOPHIL # BLD: 0.2 K/UL (ref 0–0.4)
EOSINOPHIL NFR BLD: 4 % (ref 0–7)
ERYTHROCYTE [DISTWIDTH] IN BLOOD BY AUTOMATED COUNT: 12.9 % (ref 11.5–14.5)
GLOBULIN SER CALC-MCNC: 3 G/DL (ref 2–4)
GLUCOSE BLD STRIP.AUTO-MCNC: 161 MG/DL (ref 65–117)
GLUCOSE BLD STRIP.AUTO-MCNC: 223 MG/DL (ref 65–117)
GLUCOSE SERPL-MCNC: 152 MG/DL (ref 65–100)
HCT VFR BLD AUTO: 35.7 % (ref 35–47)
HGB BLD-MCNC: 11.8 G/DL (ref 11.5–16)
IMM GRANULOCYTES # BLD AUTO: 0 K/UL (ref 0–0.04)
IMM GRANULOCYTES NFR BLD AUTO: 0 % (ref 0–0.5)
LYMPHOCYTES # BLD: 1.2 K/UL (ref 0.8–3.5)
LYMPHOCYTES NFR BLD: 24 % (ref 12–49)
MCH RBC QN AUTO: 30.3 PG (ref 26–34)
MCHC RBC AUTO-ENTMCNC: 33.1 G/DL (ref 30–36.5)
MCV RBC AUTO: 91.5 FL (ref 80–99)
MONOCYTES # BLD: 0.5 K/UL (ref 0–1)
MONOCYTES NFR BLD: 9 % (ref 5–13)
NEUTS SEG # BLD: 3.2 K/UL (ref 1.8–8)
NEUTS SEG NFR BLD: 62 % (ref 32–75)
NRBC # BLD: 0 K/UL (ref 0–0.01)
NRBC BLD-RTO: 0 PER 100 WBC
P-R INTERVAL, ECG05: 166 MS
PLATELET # BLD AUTO: 225 K/UL (ref 150–400)
PMV BLD AUTO: 10.2 FL (ref 8.9–12.9)
POTASSIUM SERPL-SCNC: 4.2 MMOL/L (ref 3.5–5.1)
PROT SERPL-MCNC: 5.9 G/DL (ref 6.4–8.2)
Q-T INTERVAL, ECG07: 398 MS
QRS DURATION, ECG06: 78 MS
QTC CALCULATION (BEZET), ECG08: 444 MS
RBC # BLD AUTO: 3.9 M/UL (ref 3.8–5.2)
SERVICE CMNT-IMP: ABNORMAL
SERVICE CMNT-IMP: ABNORMAL
SODIUM SERPL-SCNC: 140 MMOL/L (ref 136–145)
STRESS BASELINE DIAS BP: 53 MMHG
STRESS BASELINE HR: 63 BPM
STRESS BASELINE SYS BP: 176 MMHG
STRESS ESTIMATED WORKLOAD: 1 METS
STRESS EXERCISE DUR MIN: NORMAL
STRESS PEAK DIAS BP: 63 MMHG
STRESS PEAK SYS BP: 197 MMHG
STRESS PERCENT HR ACHIEVED: 70 %
STRESS POST PEAK HR: 100 BPM
STRESS RATE PRESSURE PRODUCT: NORMAL BPM*MMHG
STRESS ST DEPRESSION: 0 MM
STRESS ST ELEVATION: 0 MM
STRESS TARGET HR: 142 BPM
VENTRICULAR RATE, ECG03: 75 BPM
WBC # BLD AUTO: 5.1 K/UL (ref 3.6–11)

## 2021-05-28 PROCEDURE — 74011250636 HC RX REV CODE- 250/636: Performed by: SPECIALIST

## 2021-05-28 PROCEDURE — 85025 COMPLETE CBC W/AUTO DIFF WBC: CPT

## 2021-05-28 PROCEDURE — 96372 THER/PROPH/DIAG INJ SC/IM: CPT

## 2021-05-28 PROCEDURE — 74011250637 HC RX REV CODE- 250/637: Performed by: FAMILY MEDICINE

## 2021-05-28 PROCEDURE — 99225 PR SBSQ OBSERVATION CARE/DAY 25 MINUTES: CPT | Performed by: SPECIALIST

## 2021-05-28 PROCEDURE — 74011250636 HC RX REV CODE- 250/636: Performed by: INTERNAL MEDICINE

## 2021-05-28 PROCEDURE — 36415 COLL VENOUS BLD VENIPUNCTURE: CPT

## 2021-05-28 PROCEDURE — 80053 COMPREHEN METABOLIC PANEL: CPT

## 2021-05-28 PROCEDURE — 74011636637 HC RX REV CODE- 636/637: Performed by: INTERNAL MEDICINE

## 2021-05-28 PROCEDURE — 78452 HT MUSCLE IMAGE SPECT MULT: CPT

## 2021-05-28 PROCEDURE — 74011250637 HC RX REV CODE- 250/637: Performed by: INTERNAL MEDICINE

## 2021-05-28 PROCEDURE — 99218 HC RM OBSERVATION: CPT

## 2021-05-28 PROCEDURE — 82962 GLUCOSE BLOOD TEST: CPT

## 2021-05-28 RX ADMIN — HUMAN INSULIN 3 UNITS: 100 INJECTION, SOLUTION SUBCUTANEOUS at 11:30

## 2021-05-28 RX ADMIN — MONTELUKAST 10 MG: 10 TABLET, FILM COATED ORAL at 13:20

## 2021-05-28 RX ADMIN — ENOXAPARIN SODIUM 40 MG: 40 INJECTION SUBCUTANEOUS at 13:21

## 2021-05-28 RX ADMIN — LEVOTHYROXINE SODIUM 100 MCG: 0.1 TABLET ORAL at 06:47

## 2021-05-28 RX ADMIN — Medication 10 ML: at 13:23

## 2021-05-28 RX ADMIN — Medication 10 ML: at 06:52

## 2021-05-28 RX ADMIN — AMLODIPINE BESYLATE 2.5 MG: 5 TABLET ORAL at 13:20

## 2021-05-28 RX ADMIN — EZETIMIBE 10 MG: 10 TABLET ORAL at 13:21

## 2021-05-28 RX ADMIN — REGADENOSON 0.4 MG: 0.08 INJECTION, SOLUTION INTRAVENOUS at 10:03

## 2021-05-28 RX ADMIN — LISINOPRIL 20 MG: 20 TABLET ORAL at 13:20

## 2021-05-28 RX ADMIN — ASPIRIN 81 MG: 81 TABLET, CHEWABLE ORAL at 13:20

## 2021-05-28 RX ADMIN — LIOTHYRONINE SODIUM 12.5 MCG: 5 TABLET ORAL at 06:47

## 2021-05-28 NOTE — PROGRESS NOTES
Physical Therapy    Attempted to see patient this morning to complete evaluation of gait in anticipation of clearing patient. Patient currently SHARDA undergoing treadmill stress test . Will try to see later today if able and appropriate. RN reports plan is for d/c today pending stress test results. Based on patient's hx (indep at baseline, no DME, active), limited eval yesterday, and RN report of patient up ad mike very steady in room, would not hold up patient discharge to see therapy prior.       Oneida Robbins, MS, PT

## 2021-05-28 NOTE — PROGRESS NOTES
Bedside and Verbal shift change report given to ADALBERTO Nash   (oncoming nurse) by Vanessa Sorto (offgoing nurse). Report included the following information SBAR, Kardex, Intake/Output, MAR and Recent Results.

## 2021-05-28 NOTE — PROGRESS NOTES
5/28/2021   CARE MANAGEMENT NOTE:  CM reviewed EMR and handoff was received from previous  Danelle Martinez). Pt was admitted with syncope and collapse. Reportedly, pt resides with her . Dtr Alia Veronika ([phone]) is the primary family contact. RUR 12%    Transition Plan of Care:  1. Cardiology following for medical management - pt for stress test today  2. Plan is to return home without any anticipated post discharge needs at this writing  3. Outpt f/u  4. Family will transport pt home    CM will continue to follow pt until discharged.   Carol

## 2021-05-28 NOTE — DISCHARGE SUMMARY
Physician Discharge Summary     Patient ID:    Gunnar Combs  [id number]  89 y.o.  1945  Nestor Kaplan MD    Admit date: 5/26/2021  Discharge date and time: 5/28/2021  Admission Diagnoses: Syncope and collapse [R55]  Discharge Medications:   Current Discharge Medication List      CONTINUE these medications which have NOT CHANGED    Details   amLODIPine (NORVASC) 2.5 mg tablet Take 2.5 mg by mouth daily. ferrous sulfate 325 mg (65 mg iron) tablet Take 325 mg by mouth Daily (before breakfast). magnesium oxide 500 mg tab Take 500 mg by mouth daily. Saccharomyces boulardii (Florastor) 250 mg capsule Take 250 mg by mouth two (2) times a day. umeclidinium (Incruse Ellipta) 62.5 mcg/actuation inhaler Take 1 Puff by inhalation daily. cholecalciferol (Vitamin D3) (1000 Units /25 mcg) tablet Take 1,000 Units by mouth daily. icosapent ethyL (Vascepa) 1 gram capsule Take 2 Capsules by mouth two (2) times daily (with meals). montelukast (SINGULAIR) 10 mg tablet Take 10 mg by mouth daily. nateglinide (STARLIX) 120 mg tablet Take 120 mg by mouth Before breakfast, lunch, and dinner. levothyroxine (Synthroid) 100 mcg tablet Take 100 mcg by mouth Daily (before breakfast). Refills: 4      liothyronine (CYTOMEL) 25 mcg tablet Take 12.5 mcg by mouth Daily (before breakfast). quinapril (ACCUPRIL) 10 mg tablet Take 20 mg by mouth two (2) times a day. Refills: 3      ZETIA 10 mg tablet Take 10 mg by mouth daily. Refills: 4      nabumetone (RELAFEN) 500 mg tablet Take 1,000 mg by mouth two (2) times a day. Refills: 1      aspirin 81 mg chewable tablet Take 81 mg by mouth daily. cyanocobalamin, vitamin B-12, 5,000 mcg cap Take 5,000 mcg by mouth daily. levalbuterol tartrate (XOPENEX HFA) 45 mcg/actuation inhaler Take 2 Puffs by inhalation every four (4) hours as needed for Shortness of Breath. Follow up Care:    1.  Nestor Kaplan MD with in 1 weeks  2. specialists as directed. Diet:  Cardiac Diet and Diabetic Diet  Disposition:  Home. Advanced Directive:  Discharge Exam:  [See today's progress note.]  CONSULTATIONS: Cardiology    Significant Diagnostic Studies:   Recent Labs     05/28/21 0652 05/27/21 0520   WBC 5.1 5.6   HGB 11.8 10.9*   HCT 35.7 33.6*    218     Recent Labs     05/28/21  0652 05/27/21  0520 05/26/21  1909    140 137   K 4.2 4.5 4.8   * 110* 103   CO2 26 24 25   BUN 14 20 29*   CREA 0.81 0.98 1.48*   * 188* 236*   CA 8.7 8.4* 8.9   MG  --  2.1  --    PHOS  --  2.8  --      Recent Labs     05/28/21 0652 05/27/21 0520 05/26/21  1909   ALT 23 22 29   AP 62 62 74   TBILI 0.4 0.5 0.7   TP 5.9* 5.6* 6.5   ALB 2.9* 3.0* 3.4*   GLOB 3.0 2.6 3.1       Lab Results   Component Value Date/Time    Glucose (POC) 223 (H) 05/28/2021 11:47 AM    Glucose (POC) 161 (H) 05/28/2021 08:12 AM    Glucose (POC) 160 (H) 05/27/2021 10:39 PM    Glucose (POC) 171 (H) 05/27/2021 03:40 PM    Glucose (POC) 132 (H) 05/27/2021 12:12 PM     Lab Results   Component Value Date/Time    TSH 1.01 05/26/2021 07:09 PM     HOSPITAL COURSE & TREATMENT RENDERED:   Near Syncope and collapse: unclear etiology.  Could be due to fluctuating blood sugar, uncontrolled HTN, dehydration, mild ESTRELLA.      - Echo ok.    - Start IVF  - Cards consult- for stress today     Chest pain at rest/shoulder pain: unclear etiology.  No evidence of ACS.  Chest CT unremarkable.    - Cont ASA.    - Use IV morphine prn severe pain.    - Consult Cards     ESTRELLA: mild, likely dehydration.    - holding ACEi.    - Start IVF, improvement this am     Hypertension: uncontrolled.    - Hold ACEi.     - Use IV hydralazine prn     DM type 2 (diabetes mellitus, type 2):   - A1C 8.1%  - Hold oral agents.    - Start SSI     Asthma:   - cont singulair.    - Use nebs prn     Hypothyroid  - Continue Levothyroxine and Cytomel        HPI:   The patient is a 65 yo hx of HTN, DM, asthma, presented w/ near syncope, chest pain.  The patient c/o chest and upper back pain tonight, associated with a near syncopal event.  She also c/o dizziness, diaphoresis, and \"not feeling well. \"  The patient recently received a prednisone taper for a sinus infection.  She stated that her blood sugar has been elevated due to steroids.  In the ED, glucose was 236, Cr 1.48.  SBP ~180s.  Chest CTA neg for dissection or PE.  (Dr Resendiz)     : She is feeling better this am. No further syncopal episodes. Cards c/s and ECHO pending. Creatinine has improved with fluids.      :No complaints this am. For stress today. 12p: Stress test neg. Cleared by Cardiology for d/c. Event monitor to be mailed to her.      Review of Systems:   A comprehensive review of systems was negative except for that written in the HPI.     Objective:   Physical Exam:      Visit Vitals  BP (!) 146/54 (BP Patient Position: At rest)   Pulse 62   Temp 97.7 °F (36.5 °C)   Resp 16   Ht 5' 6\" (1.676 m)   Wt 160 lb 0.9 oz (72.6 kg)   SpO2 95%   BMI 25.83 kg/m²      O2 Device: None     Temp (24hrs), Av °F (36.7 °C), Min:97.6 °F (36.4 °C), Max:98.5 °F (36.9 °C)    1901 -  0700  In: 480 [P.O.:480]  Out: -     07 -  1900  In: 2523.8 [P.O.:240; I.V.:2283.8]  Out: 0      General:  Alert, cooperative, no distress, appears stated age. Head:  Normocephalic, without obvious abnormality, atraumatic. Eyes:  Conjunctivae/corneas clear. PERRL, EOMs intact. Nose: Nares normal. Septum midline. Mucosa normal. No drainage or sinus tenderness. Throat: Lips, mucosa, and tongue normal. Teeth and gums normal.   Neck: Supple, symmetrical, trachea midline, no adenopathy, thyroid: no enlargement/tenderness/nodules, no carotid bruit and no JVD. Back:   Symmetric, no curvature. ROM normal. No CVA tenderness. Lungs:   Clear to auscultation bilaterally. Chest wall:  No tenderness or deformity.    Heart:  Regular rate and rhythm, S1, S2 normal, no murmur, click, rub or gallop. Abdomen:   Soft, non-tender. Bowel sounds normal. No masses,  No organomegaly. Extremities: Extremities normal, atraumatic, no cyanosis or edema. No calf tenderness or cords. Pulses: 2+ and symmetric all extremities. Skin: Skin color, texture, turgor normal. No rashes or lesions   Neurologic: CNII-XII intact. Alert and oriented X 3. Fine motor of hands and fingers normal.   equal.  No cogwheeling or rigidity. Gait not tested at this time. Sensation grossly normal to touch. Gross motor of extremities normal.        Data Review:   CTA Chest 5/26/21  FINDINGS:  CHEST:  THYROID: Surgically absent. MEDIASTINUM: No mass or lymphadenopathy. JACKELYN: No mass or lymphadenopathy. THORACIC AORTA: No dissection or aneurysm. MAIN PULMONARY ARTERY: There is no evidence of pulmonary embolism. TRACHEA/BRONCHI: Patent. ESOPHAGUS: No wall thickening or dilatation. HEART: Normal in size. PLEURA: No effusion or pneumothorax. LUNGS: Emphysematous changes are noted. No acute abnormality is identified. INCIDENTALLY IMAGED UPPER ABDOMEN: Small hiatal hernia. Tiny gallstone. BONES: Degenerative changes are seen in the thoracic spine.        IMPRESSION  No acute process or evidence of pulmonary embolism. Emphysematous  changes.       Interpretation Summary- ECHO 5/27/21        · LV: Calculated LVEF is 60%. Visually measured ejection fraction. Normal cavity size, wall thickness and systolic function (ejection fraction normal). Mild (grade 1) left ventricular diastolic dysfunction.   · PV: Mild pulmonic valve regurgitation is present.               Signed:  Clinton Olson MD  5/28/2021  12:44 PM

## 2021-05-28 NOTE — DISCHARGE INSTRUCTIONS
Patient Discharge Instructions    Ailin Bolton / [id number] : 1945    Admitted 2021 Discharged: 2021 12:44 PM     ACUTE DIAGNOSES:  Syncope and collapse [R55]    CHRONIC MEDICAL DIAGNOSES:  Problem List as of 2021 Date Reviewed: 2021        Codes Class Noted - Resolved    Syncope and collapse ICD-10-CM: R55  ICD-9-CM: 780.2  2021 - Present        Chest pain at rest ICD-10-CM: R07.9  ICD-9-CM: 786.50  2021 - Present        Hypertension (Chronic) ICD-10-CM: I10  ICD-9-CM: 401.9  2021 - Present        DM type 2 (diabetes mellitus, type 2) ([facility] ICD-10-CM: E11.9  ICD-9-CM: 250.00  2021 - Present              DISCHARGE MEDICATIONS:         · It is important that you take the medication exactly as they are prescribed. · Keep your medication in the bottles provided by the pharmacist and keep a list of the medication names, dosages, and times to be taken in your wallet. · Do not take other medications without consulting your doctor. DIET:  Cardiac Diet and Diabetic Diet  ACTIVITY: Activity as tolerated    [employer] will mail you a heart monitor after discharge. Instructions will be included. ADDITIONAL INFORMATION: If you experience any of the following symptoms then please call your primary care physician or return to the emergency room if you cannot get hold of your doctor: Fever, chills, nausea, vomiting, diarrhea, change in mentation, falling, bleeding, shortness of breath. FOLLOW UP CARE:  Dr. Checo Mathis MD  you are to call and set up an appointment to see them with in 1 week. Follow-up with specialists at directed by them      Information obtained by :  I understand that if any problems occur once I am at home I am to contact my physician. I understand and acknowledge receipt of the instructions indicated above. Physician's or R.N.'s Signature                                                                  Date/Time                                                                                                                                              Patient or Representative Signature                                                          Date/Time    [facility]/ED Visit Follow-Up Instructions/Information    You may have an in home follow up visit set up with [email or web address] or may [facility] to [facility] to [facility] a visit:    What are we? [facility] is an in-home urgent care service staffed with emergency trained medical teams. We come to your home in a vehicle stocked with medical supplies and technology. An ER physician is always available if needed. When? As a part of your hospital follow-up, an appointment has been/ or can be set up for us to come see you. Usually, this will be 24-72 hours after you leave the hospital or as needed. [employer] is open 7am-9pm, 7 days a week, 365 days a year, including holidays. Why? We know that you cannot always get to your doctor after being in the hospital and that your doctor is not always available when you need them. Once your workup is complete, we'll call in your prescriptions, update your family doctor, and handle billing with your insurance so you can focus on feeling better, faster without leaving home. How much? We accept most major health insurance plans, including Medicaid, Medicare, and Medicare Advantage [facility], [facility], [place], and [employer]. We also accept: credit, debit, health savings account (HSA), health reimbursement account (HRA) and flexible spending account (FSA) payments. [employer]'s prices compare to conventional urgent care facilities, but we bring the care to you. How to reach us?   Getting care is easy- use our mobile heriberto ([email or web address]), website ([email or web address]) or call us [phone].

## 2021-05-28 NOTE — ACP (ADVANCE CARE PLANNING)
Advance Care Planning   Advance Care Planning Inpatient Note  [address]    Today's Date: 5/28/2021  Unit: [facility]  MED SURG 2     followed up on request from In-basket. Upon review of chart and communication with care team, patient's decision making abilities are not in question. Patient and Child/children were present in the room during visit. Goals of ACP Conversation:  Discuss Advance Care planning documents    Health Care Decision Makers:      Primary Decision MakerRscott Bustillo Child - [phone]    Secondary Decision Maker: Charity Bustos - Daughter - [phone]  Click here to complete [address] including selection of the Healthcare Decision Maker Relationship (ie \"Primary\")     Today we:  Updated [address] (Patient Wishes) on file:  Healthcare Power of /Advance Directive appointment of Health care agent  Living Will/ Advance Directive  Anatomical Gift/Organ donation     Assessment:     followed up with Mrs. Ramon Lennon regarding an in-basket request to assist her with an Advanced Medical Directive (AMD) on the Med. Surgical Unit. Mrs. Ramon Lennon was awake, alert, and lying in bed when the  came into the room. Her daughter, Justin Dowell was sitting next to the bedside.  introduced herself and Mrs. Ramon Lennon and Justin Dowell greeted the  warmly. Mrs. Ramon Lennon briefly spoke of her illness concerns and discussed the difficulty of the waiting process. She is hopeful to be discharged home from the hospital later today.  inquired about the AMD request and Mrs. Ramon Lennon shared that she had received the information from yesterday. With her permission,  reviewed the AMD forms and answered her questions as needed.  assisted Mrs. Ramon Lennon in completing the AMD form, placed a copy in Mrs. Nazario's chart, and returned original document and several copies to her. Mrs. Ramon Lennon thanked the  for her assistance and expressed no additional needs at this time.         Interventions:  Provided education on documents for clarity and greater understanding  Discussed and provided education on state decision maker hierarchy  Assisted in the completion of documents according to patient's wishes at this time     Outcomes/Plan:  New Advance Directive completed     Electronically signed by Chaplain Socorro on 5/28/2021 at 11:48 AM

## 2021-05-28 NOTE — PROGRESS NOTES
Spiritual Care Assessment/Progress Note  [address]      NAME: Laura Rodriguez      MRN: [id number]  AGE: 64 y.o.  SEX: female  Temple Affiliation: Adventist   Language: English     5/28/2021     Total Time (in minutes): 48     Spiritual Assessment begun in [facility]  MED SURG 2 through conversation with:         [x]Patient        [x] Family    [] Friend(s)        Reason for Consult: Advance medical directive consult     Spiritual beliefs: (Please include comment if needed)     [x] Identifies with a barbara tradition:         [] Supported by a barbara community:            [] Claims no spiritual orientation:           [] Seeking spiritual identity:                [] Adheres to an individual form of spirituality:           [] Not able to assess:                           Identified resources for coping:      [] Prayer                               [] Music                  [] Guided Imagery     [x] Family/friends                 [] Pet visits     [] Devotional reading                         [] Unknown     [] Other:                                             Interventions offered during this visit: (See comments for more details)    Patient Interventions: Advance medical directive completed, Catharsis/review of pertinent events in supportive environment, Coping skills reviewed/reinforced           Plan of Care:     [] Support spiritual and/or cultural needs    [x] Support AMD and/or advance care planning process      [] Support grieving process   [] Coordinate Rites and/or Rituals    [] Coordination with community clergy   [] No spiritual needs identified at this time   [] Detailed Plan of Care below (See Comments)  [] Make referral to Music Therapy  [] Make referral to Pet Therapy     [] Make referral to Addiction services  [] Make referral to [facility]  [] Make referral to Spiritual Care Partner  [] No future visits requested        [] Follow up upon further referrals     Comments:      followed up with Mrs. Jesenia Willams regarding an in-basket request to assist her with an Advanced Medical Directive (AMD) on the [facility]. Surgical Unit. Mrs. Jesenia Willams was awake, alert, and lying in bed when the  came into the room. Her daughter, Edouard Galeas was sitting next to the bedside.  introduced herself and Mrs. Jesenia Willams and Edouard Galeas greeted the  warmly. Mrs. Jesenia Willams briefly spoke of her illness concerns and discussed the difficulty of the waiting process. She is hopeful to be discharged home from the hospital later today.  inquired about the AMD request and Mrs. Jesenia Willams shared that she had received the information from yesterday. With her permission,  reviewed the AMD forms and answered her questions as needed.  assisted Mrs. Jesenia Willams in completing the AMD form, placed a copy in Mrs. Nazario's chart, and returned original document and several copies to her. Mrs. Jesenia Willams thanked the  for her assistance and expressed no additional needs at this time. 's are available for further support upon referral  Mary Burroughs. Magi Almendarez.      Paging Service: [phone])

## 2021-05-28 NOTE — PROGRESS NOTES
Cardiology Progress Note     Mike Carr MD,  [address], [place], [address] [facility]  Phone [phone]; Fax [phone]  Mobile [phone]   Voice Mail [phone]                               2021  6:33 PM  Edinson Boyle MD  :  1945   MRN:  [id number]     Reason for consult:  Presyncope and high back pain      Admission Diagnosis: Syncope and collapse [R55]         ATTENTION:   This medical record was transcribed using an electronic medical records/speech recognition system. Although proofread, it may and can contain electronic, spelling and other errors. Corrections may be executed at a later time. Please feel free to contact us for any clarifications as needed. Impression Plan/Recommendation   1. Presyncope  2. Upper back pain      Further recs after stress test 1.  exercise Cardiolite today. .  I told her to bring her inhalers with her. 2. Will  place an event loop monitor on her for 4 weeks also gave her information on  [employer] or Edwyna Bridger is a 64 y.o. female I am seeing for presyncope and high back pain. She has a history of diabetes type 2, hypertension and asthma with distant smoking history who was admitted with lightheadedness and high back pain. She states she was having her normal activities yesterday not taking any new medicines and was on the phone with her niece and then started making dinner was almost finished with dinner and then just felt as though she was becoming lightheaded this was followed with pain in her upper back with radiation to her shoulders. There is some slight shortness of breath and some nausea. She subsequently had some diarrhea after that. She was treated with the last 2 weeks for upper respiratory tract infection on prednisone and antibiotics. In the emergency room she had a CT that was negative for dissection or pulmonary embolus.     She has had similar symptoms but only as not as significant the last several months and received cardiac monitor about 6 months ago she says it was a Holter monitor. She did not see the cardiologist and was actually Dr. Vito Mckeon office that provided her with this. The conclusions were no irregularity in her heart rhythm. She denies any chest pain but may be some slight discomfort. She is been cleaning her house with no difficulties. Cardiac risk factors: smoking/ tobacco exposure, family history, dyslipidemia, diabetes mellitus, hypertension, post-menopausal.        Allergies   Allergen Reactions    Adhesive Rash    Ciprofloxacin-Dexamethasone Other (comments)     Headache    Codeine Nausea Only    Diflucan [Fluconazole] Other (comments)     Severe Headache    Fenofibrate Unknown (comments)     Cannot recall reaction    Statins-Hmg-Coa Reductase Inhibitors Myalgia    Sulfa (Sulfonamide Antibiotics) Rash         Past Medical History:   Diagnosis Date    Arthritis     Asthma     Diabetes mellitus, type 2 (HCC)     GERD (gastroesophageal reflux disease)     Hyperlipidemia     Hypertension     Thyroid cancer ([facility] [address]         Past Surgical History:   Procedure Laterality Date    HX APPENDECTOMY      HX  SECTION      HX DILATION AND CURETTAGE      HX THYROIDECTOMY      HX TOTAL ABDOMINAL HYSTERECTOMY          . Home Medications:  Prior to Admission Medications   Prescriptions Last Dose Informant Patient Reported? Taking? Saccharomyces boulardii (Florastor) 250 mg capsule 2021 at Unknown time Self Yes Yes   Sig: Take 250 mg by mouth two (2) times a day. ZETIA 10 mg tablet 2021 at am Self Yes Yes   Sig: Take 10 mg by mouth daily. amLODIPine (NORVASC) 2.5 mg tablet 2021 at Unknown time Self Yes Yes   Sig: Take 2.5 mg by mouth daily. aspirin 81 mg chewable tablet 2021 at am Self Yes Yes   Sig: Take 81 mg by mouth daily.    cholecalciferol (Vitamin D3) (1000 Units /25 mcg) tablet 2021 at Unknown time Self Yes Yes   Sig: Take 1,000 Units by mouth daily. cyanocobalamin, vitamin B-12, 5,000 mcg cap 5/26/2021 at Unknown time Self Yes Yes   Sig: Take 5,000 mcg by mouth daily. ferrous sulfate 325 mg (65 mg iron) tablet 5/26/2021 at Unknown time Self Yes Yes   Sig: Take 325 mg by mouth Daily (before breakfast). icosapent ethyL (Vascepa) 1 gram capsule 5/26/2021 at am Self Yes Yes   Sig: Take 2 Capsules by mouth two (2) times daily (with meals). levalbuterol tartrate (XOPENEX HFA) 45 mcg/actuation inhaler  Self Yes Yes   Sig: Take 2 Puffs by inhalation every four (4) hours as needed for Shortness of Breath. levothyroxine (Synthroid) 100 mcg tablet 5/26/2021 at am Self Yes Yes   Sig: Take 100 mcg by mouth Daily (before breakfast). liothyronine (CYTOMEL) 25 mcg tablet 5/26/2021 at Unknown time Self Yes Yes   Sig: Take 12.5 mcg by mouth Daily (before breakfast). magnesium oxide 500 mg tab  Self Yes Yes   Sig: Take 500 mg by mouth daily. montelukast (SINGULAIR) 10 mg tablet 5/26/2021 at am Self Yes Yes   Sig: Take 10 mg by mouth daily. nabumetone (RELAFEN) 500 mg tablet 5/26/2021 at am Self Yes Yes   Sig: Take 1,000 mg by mouth two (2) times a day. nateglinide (STARLIX) 120 mg tablet 5/26/2021 at am Self Yes Yes   Sig: Take 120 mg by mouth Before breakfast, lunch, and dinner. quinapril (ACCUPRIL) 10 mg tablet 5/26/2021 at am Self Yes Yes   Sig: Take 20 mg by mouth two (2) times a day. umeclidinium (Incruse Ellipta) 62.5 mcg/actuation inhaler 5/26/2021 at Unknown time Self Yes Yes   Sig: Take 1 Puff by inhalation daily.       Facility-Administered Medications: None       Hospital Medications:  Current Facility-Administered Medications   Medication Dose Route Frequency    aspirin chewable tablet 81 mg  81 mg Oral DAILY    liothyronine (CYTOMEL) tablet 12.5 mcg  12.5 mcg Oral ACB    montelukast (SINGULAIR) tablet 10 mg  10 mg Oral DAILY    levothyroxine (SYNTHROID) tablet 100 mcg  100 mcg Oral ACB    ezetimibe (ZETIA) tablet 10 mg  10 mg Oral DAILY    0.9% sodium chloride infusion  75 mL/hr IntraVENous CONTINUOUS    sodium chloride (NS) flush 5-40 mL  5-40 mL IntraVENous Q8H    sodium chloride (NS) flush 5-40 mL  5-40 mL IntraVENous PRN    0.9% sodium chloride infusion 25 mL  25 mL IntraVENous PRN    acetaminophen (TYLENOL) tablet 650 mg  650 mg Oral Q6H PRN    Or    acetaminophen (TYLENOL) suppository 650 mg  650 mg Rectal Q6H PRN    bisacodyL (DULCOLAX) suppository 10 mg  10 mg Rectal DAILY PRN    promethazine (PHENERGAN) tablet 12.5 mg  12.5 mg Oral Q6H PRN    Or    ondansetron (ZOFRAN) injection 4 mg  4 mg IntraVENous Q6H PRN    enoxaparin (LOVENOX) injection 40 mg  40 mg SubCUTAneous DAILY    albuterol-ipratropium (DUO-NEB) 2.5 MG-0.5 MG/3 ML  3 mL Nebulization Q4H PRN    insulin regular (NOVOLIN R, HUMULIN R) injection   SubCUTAneous AC&HS    glucose chewable tablet 16 g  4 Tablet Oral PRN    dextrose (D50W) injection syrg 12.5-25 g  12.5-25 g IntraVENous PRN    glucagon (GLUCAGEN) injection 1 mg  1 mg IntraMUSCular PRN    hydrALAZINE (APRESOLINE) 20 mg/mL injection 20 mg  20 mg IntraVENous Q6H PRN    morphine injection 1 mg  1 mg IntraVENous Q4H PRN    amLODIPine (NORVASC) tablet 2.5 mg  2.5 mg Oral DAILY    lisinopriL (PRINIVIL, ZESTRIL) tablet 20 mg  20 mg Oral DAILY          OBJECTIVE       Laboratory and Imaging have been reviewed and are notable for      ECG:  Date:  normal EKG, normal sinus rhythm      Diagnostic Tests:     Recent Labs     05/27/21  0520   TROIQ <0.05     Recent Labs     05/28/21  0652 05/27/21  0520 05/26/21  1909    140 137   K 4.2 4.5 4.8   CO2 26 24 25   BUN 14 20 29*   CREA 0.81 0.98 1.48*   * 188* 236*   PHOS  --  2.8  --    MG  --  2.1  --    WBC 5.1 5.6 7.4   HGB 11.8 10.9* 12.2   HCT 35.7 33.6* 36.2    218 267         Cardiac work up to date:      ECHO ADULT COMPLETE 05/27/2021 5/27/2021    Interpretation Summary  · LV: Calculated LVEF is 60%. Visually measured ejection fraction. Normal cavity size, wall thickness and systolic function (ejection fraction normal). Mild (grade 1) left ventricular diastolic dysfunction. · PV: Mild pulmonic valve regurgitation is present. Normal EF. Signed by: Nyaa Shelley MD on 5/27/2021 10:29 AM                     Social History:  Social History     Tobacco Use    Smoking status: Former Smoker    Smokeless tobacco: Never Used   Substance Use Topics    Alcohol use: No       Family History:  Family History   Problem Relation Age of Onset    Asthma Father     Hypertension Father     Hypertension Mother     Stroke Mother     Cancer Sister         thyroid       Review of Symptoms:  A comprehensive review of systems was negative except for that written in the HPI. Physical Exam:      Visit Vitals  BP (!) 146/54 (BP Patient Position: At rest)   Pulse 62   Temp 97.7 °F (36.5 °C)   Resp 16   Ht 5' 6\" (1.676 m)   Wt 72.6 kg (160 lb 0.9 oz)   SpO2 95%   BMI 25.83 kg/m²     General Appearance:  Well developed, well nourished, alert and oriented x 3, and individual in no acute distress. Ears/Nose/Mouth/Throat:   Hearing grossly normal.Normal oral mucosa,no scleral icterus     Neck: Supple,  no JVD    Chest:   Lungs clear to auscultation bilaterally,  no rales,  rhonchi or wheezing   Cardiovascular:  Regular rate and rhythm, S1, S2 normal,  No murmur. PMI nondisplaced   Abdomen:   Soft, non-tender, bowel sounds are active. Extremities: No edema bilaterally. Pulses detected, no varicosities   Skin: Warm and dry. No bruising. Neuro  Moves all extermities and neurologically intact                                                       I have discussed the diagnosis with the patient and the intended plan as seen in the above orders. Questions were answered concerning future plans. I have discussed medication side effects and warnings with the patient as well.           Umang CORTES Dianelys Castle, NP

## 2021-05-28 NOTE — PROGRESS NOTES
Daily Progress Note: 5/28/2021  Ludmila Sánchez MD    Assessment/Plan:   Near Syncope and collapse: unclear etiology. Could be due to fluctuating blood sugar, uncontrolled HTN, dehydration, mild ESTRELLA. - Echo ok. - Start IVF  - Cards consult- for stress today     Chest pain at rest/shoulder pain: unclear etiology. No evidence of ACS. Chest CT unremarkable. - Cont ASA. - Use IV morphine prn severe pain. - Consult Cards     ESTRELLA: mild, likely dehydration.    - holding ACEi.    - Start IVF, improvement this am     Hypertension: uncontrolled. - Hold ACEi.    - Use IV hydralazine prn     DM type 2 (diabetes mellitus, type 2):   - A1C 8.1%  - Hold oral agents. - Start SSI     Asthma:   - cont singulair.    - Use nebs prn     Hypothyroid  - Continue Levothyroxine and Cytomel     Problem List:  Problem List as of 5/28/2021 Date Reviewed: 5/26/2021        Codes Class Noted - Resolved    Syncope and collapse ICD-10-CM: R55  ICD-9-CM: 780.2  5/26/2021 - Present        Chest pain at rest ICD-10-CM: R07.9  ICD-9-CM: 786.50  5/26/2021 - Present        Hypertension (Chronic) ICD-10-CM: I10  ICD-9-CM: 401.9  5/26/2021 - Present        DM type 2 (diabetes mellitus, type 2) ([facility] ICD-10-CM: E11.9  ICD-9-CM: 250.00  5/26/2021 - Present              HPI:   The patient is a 63 yo hx of HTN, DM, asthma, presented w/ near syncope, chest pain. The patient c/o chest and upper back pain tonight, associated with a near syncopal event. She also c/o dizziness, diaphoresis, and \"not feeling well. \"  The patient recently received a prednisone taper for a sinus infection. She stated that her blood sugar has been elevated due to steroids. In the ED, glucose was 236, Cr 1.48. SBP ~180s. Chest CTA neg for dissection or PE. (Dr Chad Garcia)    5/27: She is feeling better this am. No further syncopal episodes. Cards c/s and ECHO pending. Creatinine has improved with fluids.      5/28:No complaints this am. For stress today. 12p: Stress test neg. Cleared by Cardiology for d/c. Event monitor to be mailed to her. Review of Systems:   A comprehensive review of systems was negative except for that written in the HPI. Objective:   Physical Exam:     Visit Vitals  BP (!) 146/54 (BP Patient Position: At rest)   Pulse 62   Temp 97.7 °F (36.5 °C)   Resp 16   Ht 5' 6\" (1.676 m)   Wt 160 lb 0.9 oz (72.6 kg)   SpO2 95%   BMI 25.83 kg/m²      O2 Device: None    Temp (24hrs), Av °F (36.7 °C), Min:97.6 °F (36.4 °C), Max:98.5 °F (36.9 °C)    1901 -  0700  In: 480 [P.O.:480]  Out: -     07 -  1900  In: 2523.8 [P.O.:240; I.V.:2283.8]  Out: 0     General:  Alert, cooperative, no distress, appears stated age. Head:  Normocephalic, without obvious abnormality, atraumatic. Eyes:  Conjunctivae/corneas clear. PERRL, EOMs intact. Nose: Nares normal. Septum midline. Mucosa normal. No drainage or sinus tenderness. Throat: Lips, mucosa, and tongue normal. Teeth and gums normal.   Neck: Supple, symmetrical, trachea midline, no adenopathy, thyroid: no enlargement/tenderness/nodules, no carotid bruit and no JVD. Back:   Symmetric, no curvature. ROM normal. No CVA tenderness. Lungs:   Clear to auscultation bilaterally. Chest wall:  No tenderness or deformity. Heart:  Regular rate and rhythm, S1, S2 normal, no murmur, click, rub or gallop. Abdomen:   Soft, non-tender. Bowel sounds normal. No masses,  No organomegaly. Extremities: Extremities normal, atraumatic, no cyanosis or edema. No calf tenderness or cords. Pulses: 2+ and symmetric all extremities. Skin: Skin color, texture, turgor normal. No rashes or lesions   Neurologic: CNII-XII intact. Alert and oriented X 3. Fine motor of hands and fingers normal.   equal.  No cogwheeling or rigidity. Gait not tested at this time. Sensation grossly normal to touch.   Gross motor of extremities normal.       Data Review:   CTA Chest 5/26/21  FINDINGS:  CHEST:  THYROID: Surgically absent. MEDIASTINUM: No mass or lymphadenopathy. JACKELYN: No mass or lymphadenopathy. THORACIC AORTA: No dissection or aneurysm. MAIN PULMONARY ARTERY: There is no evidence of pulmonary embolism. TRACHEA/BRONCHI: Patent. ESOPHAGUS: No wall thickening or dilatation. HEART: Normal in size. PLEURA: No effusion or pneumothorax. LUNGS: Emphysematous changes are noted. No acute abnormality is identified. INCIDENTALLY IMAGED UPPER ABDOMEN: Small hiatal hernia. Tiny gallstone. BONES: Degenerative changes are seen in the thoracic spine.        IMPRESSION  No acute process or evidence of pulmonary embolism. Emphysematous  changes. Interpretation Summary- ECHO 5/27/21       · LV: Calculated LVEF is 60%. Visually measured ejection fraction. Normal cavity size, wall thickness and systolic function (ejection fraction normal). Mild (grade 1) left ventricular diastolic dysfunction. · PV: Mild pulmonic valve regurgitation is present. Recent Days:  Recent Labs     05/27/21  0520 05/26/21  1909   WBC 5.6 7.4   HGB 10.9* 12.2   HCT 33.6* 36.2    267     Recent Labs     05/27/21  0520 05/26/21  1909    137   K 4.5 4.8   * 103   CO2 24 25   * 236*   BUN 20 29*   CREA 0.98 1.48*   CA 8.4* 8.9   MG 2.1  --    PHOS 2.8  --    ALB 3.0* 3.4*   TBILI 0.5 0.7   ALT 22 29     No results for input(s): PH, PCO2, PO2, HCO3, FIO2 in the last 72 hours.     24 Hour Results:  Recent Results (from the past 24 hour(s))   ECHO ADULT COMPLETE    Collection Time: 05/27/21  8:18 AM   Result Value Ref Range    IVSd 0.78 0.60 - 0.90 cm    LVIDd 4.31 3.90 - 5.30 cm    LVIDs 2.50 cm    LVOT d 1.88 cm    LVPWd 0.78 0.60 - 0.90 cm    LVOT Peak Gradient 4.96 mmHg    LVOT Peak Velocity 111.35 cm/s    RVIDd 3.83 cm    Left Atrium Major Axis 2.60 cm    LA Volume 42.42 22.0 - 52.0 mL    LA Area 4C 15.18 cm2    LA Vol 2C 38.43 22.00 - 52.00 mL    LA Vol 4C 34.42 22.00 - 52.00 mL LA Volume DISK BP 38.61 22.0 - 52.0 mL    Aortic Valve Area by Continuity of Peak Velocity 2.46 cm2    AoV PG 6.38 mmHg    Aortic Valve Systolic Peak Velocity 839.07 cm/s    MV A Yusef 141.21 centimeter/second    Mitral Valve E Wave Deceleration Time 400.67 ms    MV E Yusef 83.18 centimeter/second    LV E' Lateral Velocity 7.39 centimeter/second    LV E' Septal Velocity 5.09 centimeter/second    Mitral Valve Pressure Half-time 116.20 ms    MVA (PHT) 1.89 cm2    Pulmonic Valve Systolic Peak Instantaneous Gradient 2.87 mmHg    Pulmonic Valve Max Velocity 84.69 cm/s    Tapse 1.73 1.50 - 2.00 cm    Triscuspid Valve Regurgitation Peak Gradient 24.96 mmHg    TR Max Velocity 249.82 cm/s    AO ASC D 3.15 cm    Ao Root D 3.17 cm    IVC proximal 1.93 cm    LV Mass .3 67.0 - 162.0 g    LV Mass AL Index 56.2 43.0 - 95.0 g/m2    Left Atrium Minor Axis 1.43 cm    LA Vol Index 23.31 16.00 - 28.00 ml/m2    LA Vol Index 21.12 16.00 - 28.00 ml/m2    LA Vol Index 18.91 16.00 - 28.00 ml/m2    SKYLER/BSA Pk Yusef 1.4 cm2/m2   GLUCOSE, POC    Collection Time: 05/27/21 12:12 PM   Result Value Ref Range    Glucose (POC) 132 (H) 65 - 117 mg/dL    Performed by [facility] ([facility])    GLUCOSE, POC    Collection Time: 05/27/21  3:40 PM   Result Value Ref Range    Glucose (POC) 171 (H) 65 - 117 mg/dL    Performed by [facility] ([facility])    GLUCOSE, POC    Collection Time: 05/27/21 10:39 PM   Result Value Ref Range    Glucose (POC) 160 (H) 65 - 117 mg/dL    Performed by Fito Vergara        Medications reviewed  Current Facility-Administered Medications   Medication Dose Route Frequency    aspirin chewable tablet 81 mg  81 mg Oral DAILY    liothyronine (CYTOMEL) tablet 12.5 mcg  12.5 mcg Oral ACB    montelukast (SINGULAIR) tablet 10 mg  10 mg Oral DAILY    levothyroxine (SYNTHROID) tablet 100 mcg  100 mcg Oral ACB    ezetimibe (ZETIA) tablet 10 mg  10 mg Oral DAILY    0.9% sodium chloride infusion  75 mL/hr IntraVENous CONTINUOUS    sodium chloride (NS) flush 5-40 mL  5-40 mL IntraVENous Q8H    sodium chloride (NS) flush 5-40 mL  5-40 mL IntraVENous PRN    0.9% sodium chloride infusion 25 mL  25 mL IntraVENous PRN    acetaminophen (TYLENOL) tablet 650 mg  650 mg Oral Q6H PRN    Or    acetaminophen (TYLENOL) suppository 650 mg  650 mg Rectal Q6H PRN    bisacodyL (DULCOLAX) suppository 10 mg  10 mg Rectal DAILY PRN    promethazine (PHENERGAN) tablet 12.5 mg  12.5 mg Oral Q6H PRN    Or    ondansetron (ZOFRAN) injection 4 mg  4 mg IntraVENous Q6H PRN    enoxaparin (LOVENOX) injection 40 mg  40 mg SubCUTAneous DAILY    albuterol-ipratropium (DUO-NEB) 2.5 MG-0.5 MG/3 ML  3 mL Nebulization Q4H PRN    insulin regular (NOVOLIN R, HUMULIN R) injection   SubCUTAneous AC&HS    glucose chewable tablet 16 g  4 Tablet Oral PRN    dextrose (D50W) injection syrg 12.5-25 g  12.5-25 g IntraVENous PRN    glucagon (GLUCAGEN) injection 1 mg  1 mg IntraMUSCular PRN    hydrALAZINE (APRESOLINE) 20 mg/mL injection 20 mg  20 mg IntraVENous Q6H PRN    morphine injection 1 mg  1 mg IntraVENous Q4H PRN    amLODIPine (NORVASC) tablet 2.5 mg  2.5 mg Oral DAILY    lisinopriL (PRINIVIL, ZESTRIL) tablet 20 mg  20 mg Oral DAILY       Care Plan discussed with: Patient/Family    Total time spent with patient and review of records: 30 minutes.     Jennifer Preston MD

## 2021-06-30 ENCOUNTER — OFFICE VISIT (OUTPATIENT)
Dept: CARDIOLOGY CLINIC | Age: 76
End: 2021-06-30
Payer: MEDICARE

## 2021-06-30 VITALS
BODY MASS INDEX: 25.78 KG/M2 | SYSTOLIC BLOOD PRESSURE: 154 MMHG | OXYGEN SATURATION: 94 % | WEIGHT: 160.4 LBS | HEIGHT: 66 IN | HEART RATE: 64 BPM | DIASTOLIC BLOOD PRESSURE: 78 MMHG

## 2021-06-30 DIAGNOSIS — R06.02 SOB (SHORTNESS OF BREATH): ICD-10-CM

## 2021-06-30 DIAGNOSIS — R42 DIZZINESS: Primary | ICD-10-CM

## 2021-06-30 PROCEDURE — 1090F PRES/ABSN URINE INCON ASSESS: CPT | Performed by: SPECIALIST

## 2021-06-30 PROCEDURE — G8536 NO DOC ELDER MAL SCRN: HCPCS | Performed by: SPECIALIST

## 2021-06-30 PROCEDURE — G0463 HOSPITAL OUTPT CLINIC VISIT: HCPCS | Performed by: SPECIALIST

## 2021-06-30 PROCEDURE — 1101F PT FALLS ASSESS-DOCD LE1/YR: CPT | Performed by: SPECIALIST

## 2021-06-30 PROCEDURE — 99214 OFFICE O/P EST MOD 30 MIN: CPT | Performed by: SPECIALIST

## 2021-06-30 PROCEDURE — G8419 CALC BMI OUT NRM PARAM NOF/U: HCPCS | Performed by: SPECIALIST

## 2021-06-30 PROCEDURE — G8753 SYS BP > OR = 140: HCPCS | Performed by: SPECIALIST

## 2021-06-30 PROCEDURE — G8754 DIAS BP LESS 90: HCPCS | Performed by: SPECIALIST

## 2021-06-30 PROCEDURE — G8432 DEP SCR NOT DOC, RNG: HCPCS | Performed by: SPECIALIST

## 2021-06-30 PROCEDURE — G8400 PT W/DXA NO RESULTS DOC: HCPCS | Performed by: SPECIALIST

## 2021-06-30 PROCEDURE — G8427 DOCREV CUR MEDS BY ELIG CLIN: HCPCS | Performed by: SPECIALIST

## 2021-06-30 NOTE — PROGRESS NOTES
CARDIOLOGY OFFICE NOTE    Mike Gabriel MD, [address], [place], [address] [facility]  Phone [phone]; Fax [phone]  Mobile [phone]   Voice Mail [phone]    LAST OFFICE VISIT : 6/2/2021  Woody Barboza MD       ATTENTION:   This medical record was transcribed using an electronic medical records/speech recognition system. Although proofread, it may and can contain electronic, spelling and other errors. Corrections may be executed at a later time. Please feel free to contact us for any clarifications as needed. ICD-10-CM ICD-9-CM   1. Dizziness  R42 780.4   2. SOB (shortness of breath)  R06.02 786.05            Jenna Montoya is a 64 y.o. female with  referred for      . The patient denies chest pain/ shortness of breath, orthopnea, PND, LE edema, palpitations, syncope, presyncope or fatigue. Cardiac risk factors: smoking/ tobacco exposure, family history, dyslipidemia, diabetes mellitus, hypertension, post-menopausal.  I have personally obtained the history from the patient. HISTORY OF PRESENTING ILLNESS   From previous note: Jenna Montoya is a 64 y.o. female   She is a pleasant lady with a history of diabetes type 2, hypertension, asthma is admitted with generalized fatigue and high back pain and presyncopal symptoms. She describes lightheadedness and pain that radiated up her back and radiation to her shoulders. She was slightly short of breath with some nausea. She was recently treated for upper respiratory tract infection with prednisone antibiotics and had some episodes of diarrhea as well and has been under stress with the recent death of her sister. In the emergency room she had a CT that was negative for dissection or pulmonary embolus. She has been doing well with no interval cardiac complaints.   She is wearing the monitor but apparently they need to send her another device and has not received it as of yet.               ACTIVE PROBLEM LIST     Patient Active Problem List    Diagnosis Date Noted    Syncope and collapse 2021    Chest pain at rest 2021    Hypertension 2021    DM type 2 (diabetes mellitus, type 2) ([facility] 2021           PAST MEDICAL HISTORY     Past Medical History:   Diagnosis Date    Arthritis     Asthma     Diabetes mellitus, type 2 ([facility]     GERD (gastroesophageal reflux disease)     Hyperlipidemia     Hypertension     Thyroid cancer ([facility]            PAST SURGICAL HISTORY     Past Surgical History:   Procedure Laterality Date    HX APPENDECTOMY      HX  SECTION      HX DILATION AND CURETTAGE      HX THYROIDECTOMY      HX TOTAL ABDOMINAL HYSTERECTOMY            ALLERGIES     Allergies   Allergen Reactions    Adhesive Rash    Ciprofloxacin-Dexamethasone Other (comments)     Headache    Codeine Nausea Only    Diflucan [Fluconazole] Other (comments)     Severe Headache    Fenofibrate Unknown (comments)     Cannot recall reaction    Statins-Hmg-Coa Reductase Inhibitors Myalgia    Sulfa (Sulfonamide Antibiotics) Rash          FAMILY HISTORY     Family History   Problem Relation Age of Onset    Asthma Father     Hypertension Father     Hypertension Mother     Stroke Mother     Cancer Sister         thyroid    negative for cardiac disease       SOCIAL HISTORY     Social History     Socioeconomic History    Marital status:      Spouse name: Not on file    Number of children: Not on file    Years of education: Not on file    Highest education level: Not on file   Tobacco Use    Smoking status: Former Smoker    Smokeless tobacco: Never Used   Substance and Sexual Activity    Alcohol use: No    Drug use: No    Sexual activity: Yes     Social Determinants of Health     Financial Resource Strain:     Difficulty of Paying Living Expenses:    Food Insecurity:     Worried About Running Out of Food in the Last Year:     Ran Out of Food in the Last Year:    Transportation Needs:     Lack of Transportation (Medical):  Lack of Transportation (Non-Medical):    Physical Activity:     Days of Exercise per Week:     Minutes of Exercise per Session:    Stress:     Feeling of Stress :    Social Connections:     Frequency of Communication with Friends and Family:     Frequency of Social Gatherings with Friends and Family:     Attends Orthodoxy Services:     Active Member of Clubs or Organizations:     Attends Club or Organization Meetings:     Marital Status:          MEDICATIONS     Current Outpatient Medications   Medication Sig    amLODIPine (NORVASC) 2.5 mg tablet Take 2.5 mg by mouth daily.  ferrous sulfate 325 mg (65 mg iron) tablet Take 325 mg by mouth Daily (before breakfast).  magnesium oxide 500 mg tab Take 500 mg by mouth daily.  Saccharomyces boulardii (Florastor) 250 mg capsule Take 250 mg by mouth two (2) times a day.  umeclidinium (Incruse Ellipta) 62.5 mcg/actuation inhaler Take 1 Puff by inhalation daily.  cholecalciferol (Vitamin D3) (1000 Units /25 mcg) tablet Take 5,000 Units by mouth daily.  icosapent ethyL (Vascepa) 1 gram capsule Take 2 Capsules by mouth two (2) times daily (with meals).  montelukast (SINGULAIR) 10 mg tablet Take 10 mg by mouth daily.  nateglinide (STARLIX) 120 mg tablet Take 120 mg by mouth Before breakfast, lunch, and dinner.  levothyroxine (Synthroid) 100 mcg tablet Take 100 mcg by mouth Daily (before breakfast).  liothyronine (CYTOMEL) 25 mcg tablet Take 12.5 mcg by mouth Daily (before breakfast).  quinapril (ACCUPRIL) 10 mg tablet Take 20 mg by mouth two (2) times a day.  ZETIA 10 mg tablet Take 10 mg by mouth daily.  nabumetone (RELAFEN) 500 mg tablet Take 1,000 mg by mouth two (2) times a day.  aspirin 81 mg chewable tablet Take 81 mg by mouth daily.  cyanocobalamin, vitamin B-12, 5,000 mcg cap Take 5,000 mcg by mouth daily.     levalbuterol tartrate [facility]) 45 mcg/actuation inhaler Take 2 Puffs by inhalation every four (4) hours as needed for Shortness of Breath. No current facility-administered medications for this visit. I have reviewed the nurses notes, vitals, problem list, allergy list, medical history, family, social history and medications. REVIEW OF SYMPTOMS   Pertinent positive per HPI   General: Pt denies excessive weight gain or loss. Pt is able to conduct ADL's  HEENT: Denies blurred vision, headaches, hearing loss, epistaxis and difficulty swallowing. Respiratory: Denies cough, congestion, shortness of breath, YOUNGER, wheezing or stridor. Cardiovascular: Denies precordial pain, palpitations, edema or PND  Gastrointestinal: Denies poor appetite, indigestion, abdominal pain or blood in stool  Genitourinary: Denies hematuria, dysuria, increased urinary frequency  Musculoskeletal: Denies joint pain or swelling from muscles or joints  Neurologic: Denies tremor, paresthesias, headache, or sensory motor disturbance  Psychiatric: Denies confusion, insomnia, depression  Integumentray: Denies rash, itching or ulcers. Hematologic: Denies easy bruising, bleeding     PHYSICAL EXAMINATION      Vitals:    06/30/21 1320   BP: (!) 154/78   Pulse: 64   SpO2: 94%   Weight: 160 lb 6.4 oz (72.8 kg)   Height: 5' 6\" (1.676 m)     General: Well developed, in no acute distress. HEENT: No jaundice, oral mucosa moist, no oral ulcers  Neck: Supple, no stiffness, no lymphadenopathy, supple  Heart:  Normal S1/S2 negative S3 or S4. Regular, no murmur, gallop or rub, no jugular venous distention  Respiratory: Clear bilaterally x 4, no wheezing or rales  Extremities:  No edema, normal cap refill, no cyanosis. Musculoskeletal: No clubbing, no deformities  Neuro: A&Ox3, speech clear, gait stable, cooperative, no focal neurologic deficits  Skin: Skin color is normal. No rashes or lesions. Non diaphoretic, moist.             DIAGNOSTIC DATA     1.  Stress Test 5/28/21-Lexiscan-no ischemia, EF 72%     2. Echo   5/27/21-EF 60%, mild PI     3. CTA Chest   5/26/21-No acute process or evidence of pulmonary embolism. Emphysematous   Changes. 4. Lipids  2/10/21- , HDL 43, ,          LABORATORY DATA            Lab Results   Component Value Date/Time    WBC 5.1 05/28/2021 06:52 AM    HGB 11.8 05/28/2021 06:52 AM    HCT 35.7 05/28/2021 06:52 AM    PLATELET 979 99/95/7747 06:52 AM    MCV 91.5 05/28/2021 06:52 AM      Lab Results   Component Value Date/Time    Sodium 140 05/28/2021 06:52 AM    Potassium 4.2 05/28/2021 06:52 AM    Chloride 111 (H) 05/28/2021 06:52 AM    CO2 26 05/28/2021 06:52 AM    Anion gap 3 (L) 05/28/2021 06:52 AM    Glucose 152 (H) 05/28/2021 06:52 AM    BUN 14 05/28/2021 06:52 AM    Creatinine 0.81 05/28/2021 06:52 AM    BUN/Creatinine ratio 17 05/28/2021 06:52 AM    GFR est AA >60 05/28/2021 06:52 AM    GFR est non-AA >60 05/28/2021 06:52 AM    Calcium 8.7 05/28/2021 06:52 AM    Bilirubin, total 0.4 05/28/2021 06:52 AM    Alk. phosphatase 62 05/28/2021 06:52 AM    Protein, total 5.9 (L) 05/28/2021 06:52 AM    Albumin 2.9 (L) 05/28/2021 06:52 AM    Globulin 3.0 05/28/2021 06:52 AM    A-G Ratio 1.0 (L) 05/28/2021 06:52 AM    ALT (SGPT) 23 05/28/2021 06:52 AM           ASSESSMENT/RECOMMENDATIONS:.      1. Presyncope  -She had a previous episode prior to her last admission for presyncope and the work-up at that time with monitor was negative.  -She is currently wearing a heart monitor and also about the device to monitor at home that sinks with her cell phone  2. Dyslipidemia on Zetia and the septa  -LDL goal is under 100  -Provider information regarding calcium scoring in order to better treat her underlying dyslipidemia. Strong family history of heart disease and if she does have elevated calcium score we could always try bempedoic acid with Zetia  3.  Hypertension  -Blood pressure significantly elevated I asked that she increase her Norvasc to 5 mg. We will retry the increased dose because she is done it in the past and it made her feel tired she says. I suggest she takes her amlodipine at night    Follow-up with me in 6 weeks for blood pressure check    No orders of the defined types were placed in this encounter. We discussed the expected course, resolution and complications of the diagnosis(es) in detail. Medication risks, benefits, costs, interactions, and alternatives were discussed as indicated. I advised him to contact the office if his condition worsens, changes or fails to improve as anticipated. He expressed understanding with the diagnosis(es) and plan          Follow-up and Dispositions  ·   Return in about 6 months (around 12/30/2021). I have discussed the diagnosis with  Karthikeyan Santana and the intended plan as seen in the above orders. Questions were answered concerning future plans. I have discussed medication side effects and warnings with the patient as well. Thank you,  Gatito Bird MD for involving me in the care of  Karthikeyan Santana. Please do not hesitate to contact me for further questions/concerns. Mike Pride MD, [address]      [facility], [address] [place] [address] [address]      [phone] / [phone] Fax

## 2021-06-30 NOTE — PROGRESS NOTES
Karthikeyan Santana is a 64 y.o. female    Chief Complaint   Patient presents with   [facility] Follow Up    Chest Pain    Hypertension     Has not been wearing monitor waiting on new strips    Chest pain No    SOB COPD    Dizziness No    Swelling some swelling in her ankles    Refills No    Visit Vitals  BP (!) 154/78 (BP 1 Location: Left upper arm, BP Patient Position: Sitting)   Pulse 64   Ht 5' 6\" (1.676 m)   Wt 160 lb 6.4 oz (72.8 kg)   SpO2 94%   BMI 25.89 kg/m²       1. Have you been to the ER, urgent care clinic since your last visit? Hospitalized since your last visit? ED 5/26-5/28 syncope    2. Have you seen or consulted any other health care providers outside of the [address] [place] [address] since your last visit? Include any pap smears or colon screening.   no

## 2021-06-30 NOTE — PATIENT INSTRUCTIONS
1) consider calcium scoring    2) continue to wear the  Monitor    3) BP is elevated today.     4) increase the amlodipine to 5 mg a day    5) return in 6 weeks for BP check

## 2021-07-01 RX ORDER — AMLODIPINE BESYLATE 5 MG/1
5 TABLET ORAL DAILY
Qty: 30 TABLET | Refills: 3 | Status: SHIPPED | OUTPATIENT
Start: 2021-07-01 | End: 2021-09-22

## 2021-07-01 RX ORDER — AMLODIPINE BESYLATE 5 MG/1
5 TABLET ORAL DAILY
COMMUNITY
End: 2021-07-01 | Stop reason: SDUPTHER

## 2021-08-02 NOTE — PROGRESS NOTES
S/W pt to verify vaccine status. As per pt COVID series was completed 2/2021. Instructed to bring COVID vaccine card on DOS.

## 2021-08-19 ENCOUNTER — TELEPHONE (OUTPATIENT)
Dept: CARDIOLOGY CLINIC | Age: 76
End: 2021-08-19

## 2021-08-19 ENCOUNTER — OFFICE VISIT (OUTPATIENT)
Dept: CARDIOLOGY CLINIC | Age: 76
End: 2021-08-19

## 2021-08-19 DIAGNOSIS — I10 ESSENTIAL HYPERTENSION: ICD-10-CM

## 2021-08-19 DIAGNOSIS — R06.02 SOB (SHORTNESS OF BREATH): ICD-10-CM

## 2021-08-19 DIAGNOSIS — R42 DIZZINESS: Primary | ICD-10-CM

## 2021-08-19 NOTE — PROGRESS NOTES
CARDIOLOGY OFFICE NOTE    Mike Cronin MD, [address], [place], [address] [facility]  Phone [phone]; Fax [phone]  Mobile [phone]   Voice Mail [phone]    LAST OFFICE VISIT : 6/2/2021  Ana Aguilar MD       ATTENTION:   This medical record was transcribed using an electronic medical records/speech recognition system. Although proofread, it may and can contain electronic, spelling and other errors. Corrections may be executed at a later time. Please feel free to contact us for any clarifications as needed. ICD-10-CM ICD-9-CM   1. Dizziness  R42 780.4   2. SOB (shortness of breath)  R06.02 786.05   3. Essential hypertension  I10 401.9            Sean Dillard is a 64 y.o. female with  referred for      . The patient denies chest pain/ shortness of breath, orthopnea, PND, LE edema, palpitations, syncope, presyncope or fatigue. Cardiac risk factors: smoking/ tobacco exposure, family history, dyslipidemia, diabetes mellitus, hypertension, post-menopausal.  I have personally obtained the history from the patient. HISTORY OF PRESENTING ILLNESS   From previous note: Sean Dillard is a 64 y.o. female   She is a pleasant lady with a history of diabetes type 2, hypertension, asthma is admitted with generalized fatigue and high back pain and presyncopal symptoms. She describes lightheadedness and pain that radiated up her back and radiation to her shoulders. She was slightly short of breath with some nausea. She was recently treated for upper respiratory tract infection with prednisone antibiotics and had some episodes of diarrhea as well and has been under stress with the recent death of her sister. In the emergency room she had a CT that was negative for dissection or pulmonary embolus. She has been doing well with no interval cardiac complaints.   She is wearing the monitor but apparently they need to send her another device and has not received it as of yet.                ACTIVE PROBLEM LIST     Patient Active Problem List    Diagnosis Date Noted    Syncope and collapse 2021    Chest pain at rest 2021    Hypertension 2021    DM type 2 (diabetes mellitus, type 2) ([facility] 2021           PAST MEDICAL HISTORY     Past Medical History:   Diagnosis Date    Arthritis     Asthma     Diabetes mellitus, type 2 ([facility]     GERD (gastroesophageal reflux disease)     Hyperlipidemia     Hypertension     Thyroid cancer ([facility]            PAST SURGICAL HISTORY     Past Surgical History:   Procedure Laterality Date    HX APPENDECTOMY      HX  SECTION      HX DILATION AND CURETTAGE      HX THYROIDECTOMY      HX TOTAL ABDOMINAL HYSTERECTOMY            ALLERGIES     Allergies   Allergen Reactions    Adhesive Rash    Ciprofloxacin-Dexamethasone Other (comments)     Headache    Codeine Nausea Only    Diflucan [Fluconazole] Other (comments)     Severe Headache    Fenofibrate Unknown (comments)     Cannot recall reaction    Statins-Hmg-Coa Reductase Inhibitors Myalgia    Sulfa (Sulfonamide Antibiotics) Rash          FAMILY HISTORY     Family History   Problem Relation Age of Onset    Asthma Father     Hypertension Father     Hypertension Mother     Stroke Mother     Cancer Sister         thyroid    negative for cardiac disease       SOCIAL HISTORY     Social History     Socioeconomic History    Marital status:      Spouse name: Not on file    Number of children: Not on file    Years of education: Not on file    Highest education level: Not on file   Tobacco Use    Smoking status: Former Smoker    Smokeless tobacco: Never Used   Substance and Sexual Activity    Alcohol use: No    Drug use: No    Sexual activity: Yes     Social Determinants of Health     Financial Resource Strain:     Difficulty of Paying Living Expenses:    Food Insecurity:     Worried About Running Out of Food in the Last Year:     Ran Out of Food in the Last Year:    Transportation Needs:     Lack of Transportation (Medical):  Lack of Transportation (Non-Medical):    Physical Activity:     Days of Exercise per Week:     Minutes of Exercise per Session:    Stress:     Feeling of Stress :    Social Connections:     Frequency of Communication with Friends and Family:     Frequency of Social Gatherings with Friends and Family:     Attends Latter-day Services:     Active Member of Clubs or Organizations:     Attends Club or Organization Meetings:     Marital Status:          MEDICATIONS     Current Outpatient Medications   Medication Sig    amLODIPine (NORVASC) 5 mg tablet Take 1 Tablet by mouth daily.  ferrous sulfate 325 mg (65 mg iron) tablet Take 325 mg by mouth Daily (before breakfast).  magnesium oxide 500 mg tab Take 500 mg by mouth daily.  Saccharomyces boulardii (Florastor) 250 mg capsule Take 250 mg by mouth two (2) times a day.  umeclidinium (Incruse Ellipta) 62.5 mcg/actuation inhaler Take 1 Puff by inhalation daily.  cholecalciferol (Vitamin D3) (1000 Units /25 mcg) tablet Take 5,000 Units by mouth daily.  icosapent ethyL (Vascepa) 1 gram capsule Take 2 Capsules by mouth two (2) times daily (with meals).  montelukast (SINGULAIR) 10 mg tablet Take 10 mg by mouth daily.  nateglinide (STARLIX) 120 mg tablet Take 120 mg by mouth Before breakfast, lunch, and dinner.  levothyroxine (Synthroid) 100 mcg tablet Take 100 mcg by mouth Daily (before breakfast).  liothyronine (CYTOMEL) 25 mcg tablet Take 12.5 mcg by mouth Daily (before breakfast).  quinapril (ACCUPRIL) 10 mg tablet Take 20 mg by mouth two (2) times a day.  ZETIA 10 mg tablet Take 10 mg by mouth daily.  nabumetone (RELAFEN) 500 mg tablet Take 1,000 mg by mouth two (2) times a day.  aspirin 81 mg chewable tablet Take 81 mg by mouth daily.  cyanocobalamin, vitamin B-12, 5,000 mcg cap Take 5,000 mcg by mouth daily.  levalbuterol tartrate (XOPENEX HFA) 45 mcg/actuation inhaler Take 2 Puffs by inhalation every four (4) hours as needed for Shortness of Breath. No current facility-administered medications for this visit. I have reviewed the nurses notes, vitals, problem list, allergy list, medical history, family, social history and medications. REVIEW OF SYMPTOMS   Pertinent positive per HPI   General: Pt denies excessive weight gain or loss. Pt is able to conduct ADL's  HEENT: Denies blurred vision, headaches, hearing loss, epistaxis and difficulty swallowing. Respiratory: Denies cough, congestion, shortness of breath, YOUNGER, wheezing or stridor. Cardiovascular: Denies precordial pain, palpitations, edema or PND  Gastrointestinal: Denies poor appetite, indigestion, abdominal pain or blood in stool  Genitourinary: Denies hematuria, dysuria, increased urinary frequency  Musculoskeletal: Denies joint pain or swelling from muscles or joints  Neurologic: Denies tremor, paresthesias, headache, or sensory motor disturbance  Psychiatric: Denies confusion, insomnia, depression  Integumentray: Denies rash, itching or ulcers. Hematologic: Denies easy bruising, bleeding     PHYSICAL EXAMINATION      There were no vitals filed for this visit. General: Well developed, in no acute distress. HEENT: No jaundice, oral mucosa moist, no oral ulcers  Neck: Supple, no stiffness, no lymphadenopathy, supple  Heart:  Normal S1/S2 negative S3 or S4. Regular, no murmur, gallop or rub, no jugular venous distention  Respiratory: Clear bilaterally x 4, no wheezing or rales  Extremities:  No edema, normal cap refill, no cyanosis. Musculoskeletal: No clubbing, no deformities  Neuro: A&Ox3, speech clear, gait stable, cooperative, no focal neurologic deficits  Skin: Skin color is normal. No rashes or lesions. Non diaphoretic, moist.             DIAGNOSTIC DATA     1. Stress Test   5/28/21-Lexiscan-no ischemia, EF 72%     2.  Echo 5/27/21-EF 60%, mild PI     3. CTA Chest   5/26/21-No acute process or evidence of pulmonary embolism. Emphysematous   Changes. 4. Lipids   2/10/21- , HDL 43, ,     5. Calcium Score  7/22/21- LM 0                LAD 22.4               LCirc 0               RCA 39.4    Total 61.8         LABORATORY DATA            Lab Results   Component Value Date/Time    WBC 5.1 05/28/2021 06:52 AM    HGB 11.8 05/28/2021 06:52 AM    HCT 35.7 05/28/2021 06:52 AM    PLATELET 260 89/06/3287 06:52 AM    MCV 91.5 05/28/2021 06:52 AM      Lab Results   Component Value Date/Time    Sodium 140 05/28/2021 06:52 AM    Potassium 4.2 05/28/2021 06:52 AM    Chloride 111 (H) 05/28/2021 06:52 AM    CO2 26 05/28/2021 06:52 AM    Anion gap 3 (L) 05/28/2021 06:52 AM    Glucose 152 (H) 05/28/2021 06:52 AM    BUN 14 05/28/2021 06:52 AM    Creatinine 0.81 05/28/2021 06:52 AM    BUN/Creatinine ratio 17 05/28/2021 06:52 AM    GFR est AA >60 05/28/2021 06:52 AM    GFR est non-AA >60 05/28/2021 06:52 AM    Calcium 8.7 05/28/2021 06:52 AM    Bilirubin, total 0.4 05/28/2021 06:52 AM    Alk. phosphatase 62 05/28/2021 06:52 AM    Protein, total 5.9 (L) 05/28/2021 06:52 AM    Albumin 2.9 (L) 05/28/2021 06:52 AM    Globulin 3.0 05/28/2021 06:52 AM    A-G Ratio 1.0 (L) 05/28/2021 06:52 AM    ALT (SGPT) 23 05/28/2021 06:52 AM           ASSESSMENT/RECOMMENDATIONS:.      1. Presyncope  -She had a previous episode prior to her last admission for presyncope and the work-up at that time with monitor was negative.  -She is currently wearing a heart monitor and also about the device to monitor at home that sinks with her cell phone  2. Dyslipidemia on Zetia and the septa  -LDL goal is under 100  -Provider information regarding calcium scoring in order to better treat her underlying dyslipidemia.   Strong family history of heart disease and if she does have elevated calcium score we could always try bempedoic acid with Zetia  3. Hypertension  -Blood pressure significantly elevated I asked that she increase her Norvasc to 5 mg. We will retry the increased dose because she is done it in the past and it made her feel tired she says. I suggest she takes her amlodipine at night    Follow-up with me in 6 weeks for blood pressure check    No orders of the defined types were placed in this encounter. We discussed the expected course, resolution and complications of the diagnosis(es) in detail. Medication risks, benefits, costs, interactions, and alternatives were discussed as indicated. I advised him to contact the office if his condition worsens, changes or fails to improve as anticipated. He expressed understanding with the diagnosis(es) and plan          Follow-up and Dispositions  ·   Return in about 6 months (around 2/19/2022). I have discussed the diagnosis with  Nicole Tapia and the intended plan as seen in the above orders. Questions were answered concerning future plans. I have discussed medication side effects and warnings with the patient as well. Thank you,  Nicholas Bartlett MD for involving me in the care of  Nicole Tapia. Please do not hesitate to contact me for further questions/concerns. Mike Pride MD, [address] [place] [address] Rd[address]      [address] [facility], [address] [place] [address], [facility] [address]      [phone] / [phone] Fax

## 2021-08-19 NOTE — TELEPHONE ENCOUNTER
Patient calling to get an appointment to be cleared for finger surgery scheduled for 0901/21, patient had an appointment on 8/19/21 but had to cancel due to not having covid results and she called back later on and stated her test was negative and she can reschedule and also stated that someone told he that they can work her in so that she will still be able to have her surgery, please advise           [phone]

## 2021-08-24 NOTE — PROGRESS NOTES
CARDIOLOGY OFFICE NOTE    Mike Mendez MD, [address], [place], [address] [facility]  Phone [phone]; Fax [phone]  Mobile [phone]   Voice Mail [phone]    LAST OFFICE VISIT : 6/2/2021  Gabriela Colon MD       ATTENTION:   This medical record was transcribed using an electronic medical records/speech recognition system. Although proofread, it may and can contain electronic, spelling and other errors. Corrections may be executed at a later time. Please feel free to contact us for any clarifications as needed. ICD-10-CM ICD-9-CM   1. Dizziness  R42 780.4   2. SOB (shortness of breath)  R06.02 786.05   3. Essential hypertension  I10 401.9            Victor Manuel Iraheta is a 64 y.o. female with  referred for      . The patient denies chest pain/ shortness of breath, orthopnea, PND, LE edema, palpitations, syncope, presyncope or fatigue. Cardiac risk factors: smoking/ tobacco exposure, family history, dyslipidemia, diabetes mellitus, hypertension, post-menopausal.  I have personally obtained the history from the patient. HISTORY OF PRESENTING ILLNESS      Victor Manuel Iraheta is a 64 y.o. female who has a history of diabetes type 2 hypertension and asthma and has been doing well since I last saw her. She did wear a heart monitor that did not demonstrate any significant abnormalities she is scheduled to have a trigger finger surgery soon and is here for cardiac preoperative risk ratification. EKG was normal she is not having chest pain or shortness of breath and all cardiac testing was done in 2021 and was normal.  Calcium score was elevated and we talked about putting her on a cholesterol medicine but she says she stated tried all the statins and has inability move her extremities when she takes these medicines. She is willing to try bempedoic acid with Zetia.                ACTIVE PROBLEM LIST     Patient Active Problem List    Diagnosis Date Noted    Syncope and collapse 2021    Chest pain at rest 2021    Hypertension 2021    DM type 2 (diabetes mellitus, type 2) ([place] [address].) 2021           PAST MEDICAL HISTORY     Past Medical History:   Diagnosis Date    Arthritis     Asthma     Diabetes mellitus, type 2 ([place] [address].)     GERD (gastroesophageal reflux disease)     Hyperlipidemia     Hypertension     Thyroid cancer ([place] [address].)            PAST SURGICAL HISTORY     Past Surgical History:   Procedure Laterality Date    HX APPENDECTOMY      HX  SECTION      HX DILATION AND CURETTAGE      HX THYROIDECTOMY      HX TOTAL ABDOMINAL HYSTERECTOMY            ALLERGIES     Allergies   Allergen Reactions    Adhesive Rash    Ciprofloxacin-Dexamethasone Other (comments)     Headache    Codeine Nausea Only    Diflucan [Fluconazole] Other (comments)     Severe Headache    Fenofibrate Unknown (comments)     Cannot recall reaction    Statins-Hmg-Coa Reductase Inhibitors Myalgia    Sulfa (Sulfonamide Antibiotics) Rash          FAMILY HISTORY     Family History   Problem Relation Age of Onset    Asthma Father     Hypertension Father     Hypertension Mother     Stroke Mother     Cancer Sister         thyroid    negative for cardiac disease       SOCIAL HISTORY     Social History     Socioeconomic History    Marital status:      Spouse name: Not on file    Number of children: Not on file    Years of education: Not on file    Highest education level: Not on file   Tobacco Use    Smoking status: Former Smoker    Smokeless tobacco: Never Used   Substance and Sexual Activity    Alcohol use: No    Drug use: No    Sexual activity: Yes     Social Determinants of Health     Financial Resource Strain:     Difficulty of Paying Living Expenses:    Food Insecurity:     Worried About Running Out of Food in the Last Year:     Ran Out of Food in the Last Year:    Transportation Needs:     Lack of Transportation (Medical):      Lack of Transportation (Non-Medical):    Physical Activity:     Days of Exercise per Week:     Minutes of Exercise per Session:    Stress:     Feeling of Stress :    Social Connections:     Frequency of Communication with Friends and Family:     Frequency of Social Gatherings with Friends and Family:     Attends [employer] Services:     Active Member of Clubs or Organizations:     Attends Club or Organization Meetings:     Marital Status:          MEDICATIONS     Current Outpatient Medications   Medication Sig    amLODIPine (NORVASC) 5 mg tablet Take 1 Tablet by mouth daily.  ferrous sulfate 325 mg (65 mg iron) tablet Take 325 mg by mouth Daily (before breakfast).  magnesium oxide 500 mg tab Take 500 mg by mouth daily.  Saccharomyces boulardii (Florastor) 250 mg capsule Take 250 mg by mouth two (2) times a day.  umeclidinium (Incruse Ellipta) 62.5 mcg/actuation inhaler Take 1 Puff by inhalation daily.  cholecalciferol (Vitamin D3) (1000 Units /25 mcg) tablet Take 5,000 Units by mouth daily.  icosapent ethyL (Vascepa) 1 gram capsule Take 2 Capsules by mouth two (2) times daily (with meals).  montelukast (SINGULAIR) 10 mg tablet Take 10 mg by mouth daily.  nateglinide (STARLIX) 120 mg tablet Take 120 mg by mouth Before breakfast, lunch, and dinner.  levothyroxine (Synthroid) 100 mcg tablet Take 100 mcg by mouth Daily (before breakfast).  liothyronine (CYTOMEL) 25 mcg tablet Take 12.5 mcg by mouth Daily (before breakfast).  quinapril (ACCUPRIL) 10 mg tablet Take 20 mg by mouth two (2) times a day.  ZETIA 10 mg tablet Take 10 mg by mouth daily.  nabumetone (RELAFEN) 500 mg tablet Take 1,000 mg by mouth two (2) times a day.  aspirin 81 mg chewable tablet Take 81 mg by mouth daily.  cyanocobalamin, vitamin B-12, 5,000 mcg cap Take 5,000 mcg by mouth daily.     levalbuterol tartrate (XOPENEX HFA) 45 mcg/actuation inhaler Take 2 Puffs by inhalation every four (4) hours as needed for Shortness of Breath. No current facility-administered medications for this visit. I have reviewed the nurses notes, vitals, problem list, allergy list, medical history, family, social history and medications. REVIEW OF SYMPTOMS   Pertinent positive per HPI   General: Pt denies excessive weight gain or loss. Pt is able to conduct ADL's  HEENT: Denies blurred vision, headaches, hearing loss, epistaxis and difficulty swallowing. Respiratory: Denies cough, congestion, shortness of breath, YOUNGER, wheezing or stridor. Cardiovascular: Denies precordial pain, palpitations, edema or PND  Gastrointestinal: Denies poor appetite, indigestion, abdominal pain or blood in stool  Genitourinary: Denies hematuria, dysuria, increased urinary frequency  Musculoskeletal: Denies joint pain or swelling from muscles or joints  Neurologic: Denies tremor, paresthesias, headache, or sensory motor disturbance  Psychiatric: Denies confusion, insomnia, depression  Integumentray: Denies rash, itching or ulcers. Hematologic: Denies easy bruising, bleeding     PHYSICAL EXAMINATION      Vitals:    08/25/21 1309   BP: 126/80   Pulse: 62   SpO2: 92%   Weight: 160 lb 9.6 oz (72.8 kg)   Height: 5' 6\" (1.676 m)     General: Well developed, in no acute distress. HEENT: No jaundice, oral mucosa moist, no oral ulcers  Neck: Supple, no stiffness, no lymphadenopathy, supple  Heart:  Normal S1/S2 negative S3 or S4. Regular, no murmur, gallop or rub, no jugular venous distention  Respiratory: Clear bilaterally x 4, no wheezing or rales  Extremities:  No edema, normal cap refill, no cyanosis. Musculoskeletal: No clubbing, no deformities  Neuro: A&Ox3, speech clear, gait stable, cooperative, no focal neurologic deficits  Skin: Skin color is normal. No rashes or lesions. Non diaphoretic, moist.             DIAGNOSTIC DATA     1. Stress Test   5/28/21-Lexiscan-no ischemia, EF 72%     2. Echo   5/27/21-EF 60%, mild PI     3.  CTA Chest 5/26/21-No acute process or evidence of pulmonary embolism. Emphysematous   Changes. 4. Lipids   2/10/21- , HDL 43, ,     5. Calcium Score  7/22/21- LM 0                LAD 22.4               LCirc 0               RCA 39.4    Total 61.8         LABORATORY DATA            Lab Results   Component Value Date/Time    WBC 5.1 05/28/2021 06:52 AM    HGB 11.8 05/28/2021 06:52 AM    HCT 35.7 05/28/2021 06:52 AM    PLATELET 222 28/73/5651 06:52 AM    MCV 91.5 05/28/2021 06:52 AM      Lab Results   Component Value Date/Time    Sodium 140 05/28/2021 06:52 AM    Potassium 4.2 05/28/2021 06:52 AM    Chloride 111 (H) 05/28/2021 06:52 AM    CO2 26 05/28/2021 06:52 AM    Anion gap 3 (L) 05/28/2021 06:52 AM    Glucose 152 (H) 05/28/2021 06:52 AM    BUN 14 05/28/2021 06:52 AM    Creatinine 0.81 05/28/2021 06:52 AM    BUN/Creatinine ratio 17 05/28/2021 06:52 AM    GFR est AA >60 05/28/2021 06:52 AM    GFR est non-AA >60 05/28/2021 06:52 AM    Calcium 8.7 05/28/2021 06:52 AM    Bilirubin, total 0.4 05/28/2021 06:52 AM    Alk. phosphatase 62 05/28/2021 06:52 AM    Protein, total 5.9 (L) 05/28/2021 06:52 AM    Albumin 2.9 (L) 05/28/2021 06:52 AM    Globulin 3.0 05/28/2021 06:52 AM    A-G Ratio 1.0 (L) 05/28/2021 06:52 AM    ALT (SGPT) 23 05/28/2021 06:52 AM           ASSESSMENT/RECOMMENDATIONS:.      1. Presyncope  -Event loop recorder from 6/7/2020-7/6/2021 demonstrated no's critical or serious events and 7 stable events.  -Has not had any further presyncopal episodes  2. Dyslipidemia on Zetia   -LDL goal is under 100  -Calcium score was elevated in the 60s. Will attempt to get her bempedoic acid with Zetia.-  3. Hypertension  -Blood pressure has been significantly better on the increased dose of Norvasc to 5 mg a day.   4.  Cardiac preoperative risk stratification for noncardiac surgery  -ECG from 8/8/2021 is a normal sinus rhythm  -She is low cardiac operative risk and may proceed with surgery would prefer she stay on a baby aspirin a day however this is prohibited then stop it 5 days in advance of her surgery    Follow-up with me in 6 weeks for blood pressure check    Orders Placed This Encounter    AMB POC EKG ROUTINE W/ 12 LEADS, INTER & REP     Order Specific Question:   Reason for Exam:     Answer:   HTN       We discussed the expected course, resolution and complications of the diagnosis(es) in detail. Medication risks, benefits, costs, interactions, and alternatives were discussed as indicated. I advised him to contact the office if his condition worsens, changes or fails to improve as anticipated. He expressed understanding with the diagnosis(es) and plan          Follow-up and Dispositions  ·   Return in about 6 months (around 2/25/2022). Follow-up and Disposition History            I have discussed the diagnosis with  Eugenia Moreno and the intended plan as seen in the above orders. Questions were answered concerning future plans. I have discussed medication side effects and warnings with the patient as well. Thank you,  David Allen MD for involving me in the care of  Eugenia Moreno. Please do not hesitate to contact me for further questions/concerns. Mike Pride MD, [address]      [facility], [address] [place] [address] [address]      [phone] / [phone] Fax

## 2021-08-25 ENCOUNTER — OFFICE VISIT (OUTPATIENT)
Dept: CARDIOLOGY CLINIC | Age: 76
End: 2021-08-25
Payer: MEDICARE

## 2021-08-25 VITALS
OXYGEN SATURATION: 92 % | DIASTOLIC BLOOD PRESSURE: 80 MMHG | SYSTOLIC BLOOD PRESSURE: 126 MMHG | HEART RATE: 62 BPM | HEIGHT: 66 IN | BODY MASS INDEX: 25.81 KG/M2 | WEIGHT: 160.6 LBS

## 2021-08-25 DIAGNOSIS — R42 DIZZINESS: Primary | ICD-10-CM

## 2021-08-25 DIAGNOSIS — I10 ESSENTIAL HYPERTENSION: ICD-10-CM

## 2021-08-25 DIAGNOSIS — R06.02 SOB (SHORTNESS OF BREATH): ICD-10-CM

## 2021-08-25 PROCEDURE — G8419 CALC BMI OUT NRM PARAM NOF/U: HCPCS | Performed by: SPECIALIST

## 2021-08-25 PROCEDURE — G8427 DOCREV CUR MEDS BY ELIG CLIN: HCPCS | Performed by: SPECIALIST

## 2021-08-25 PROCEDURE — G0463 HOSPITAL OUTPT CLINIC VISIT: HCPCS | Performed by: SPECIALIST

## 2021-08-25 PROCEDURE — 1101F PT FALLS ASSESS-DOCD LE1/YR: CPT | Performed by: SPECIALIST

## 2021-08-25 PROCEDURE — G8400 PT W/DXA NO RESULTS DOC: HCPCS | Performed by: SPECIALIST

## 2021-08-25 PROCEDURE — G8536 NO DOC ELDER MAL SCRN: HCPCS | Performed by: SPECIALIST

## 2021-08-25 PROCEDURE — G8432 DEP SCR NOT DOC, RNG: HCPCS | Performed by: SPECIALIST

## 2021-08-25 PROCEDURE — G8752 SYS BP LESS 140: HCPCS | Performed by: SPECIALIST

## 2021-08-25 PROCEDURE — 1090F PRES/ABSN URINE INCON ASSESS: CPT | Performed by: SPECIALIST

## 2021-08-25 PROCEDURE — 93005 ELECTROCARDIOGRAM TRACING: CPT | Performed by: SPECIALIST

## 2021-08-25 PROCEDURE — 99214 OFFICE O/P EST MOD 30 MIN: CPT | Performed by: SPECIALIST

## 2021-08-25 PROCEDURE — G8754 DIAS BP LESS 90: HCPCS | Performed by: SPECIALIST

## 2021-08-25 PROCEDURE — 93010 ELECTROCARDIOGRAM REPORT: CPT | Performed by: SPECIALIST

## 2021-08-25 NOTE — PATIENT INSTRUCTIONS
1) your cholesterol is borderline but your calcium score is slightly high. I will attempt to get you bempedoic acid with Zetia. This will be processed through a pharmacy and from [facility]. 2) I believe you are a low cardiac operative risk for low risk procedure. I would prefer you stay on a baby aspirin a day but if that is felt to be prohibited then I would stop the aspirin 5 days in advance of your surgery.     3) follow-up with me in 6 months

## 2021-08-25 NOTE — PROGRESS NOTES
Shanae Brown is a 64 y.o. female    Chief Complaint   Patient presents with    Pre-op Exam     Surgery September 1 with Dr. Lev German trigger finger    Chest pain No    SOB some SOB     Dizziness No    Swelling patient states swelling in her feet    Refills No    Visit Vitals  Ht 5' 6\" (1.676 m)   BMI 25.89 kg/m²       1. Have you been to the ER, urgent care clinic since your last visit? Hospitalized since your last visit? No    2. Have you seen or consulted any other health care providers outside of the [address] [place] [address] since your last visit? Include any pap smears or colon screening.   No

## 2021-08-26 NOTE — PERIOP NOTES
[address] [facility], [address] [facility]   MAIN OR                                  [phone]   MAIN PRE OP                          [phone]                                                                                AMBULATORY PRE OP          [phone]  PRE-ADMISSION TESTING    [phone]   Surgery Date: Wednesday 9/1/21       Is surgery arrival time given by surgeon? YES  NO  If NO, [facility] staff will call you between 3 and 7pm the day before your surgery with your arrival time. (If your surgery is on a Monday, we will call you the Friday before.)    Call [phone] after 7pm Monday-Friday if you did not receive this call. INSTRUCTIONS BEFORE YOUR SURGERY   When You  Arrive Arrive at the 2nd [address] [facility] on the day of your surgery  Have your insurance card, photo ID, and any copayment (if needed)   Food   and   Drink NO food or drink after midnight the night before surgery    This means NO water, gum, mints, coffee, juice, etc.  No alcohol (beer, wine, liquor) 24 hours before and after surgery   Medications to   TAKE   Morning of Surgery MEDICATIONS TO TAKE THE MORNING OF SURGERY WITH A SIP OF WATER:    Levothyroxine    Cytomel   Check your blood sugar if it is low you can take glucose tablets   Medications  To  STOP      7 days before surgery  Non-Steroidal anti-inflammatory Drugs (NSAID's): for example, Ibuprofen (Advil, Motrin), Naproxen (Aleve)   Aspirin, if taking for pain    Herbal supplements, vitamins, and fish oil   Other:  (Pain medications not listed above, including Tylenol may be taken)   Blood  Thinners  If you take  Aspirin, Plavix, Coumadin, or any blood-thinning or anti-blood clot medicine, talk to the doctor who prescribed the medications for pre-operative instructions.    Bathing Clothing  Jewelry  Valuables      If you shower the morning of surgery, please do not apply anything to your skin (lotions, powders, deodorant, or makeup, especially mascara)   Follow Chlorhexidine Care Fusion body wash instructions provided to you during PAT appointment. Begin 3 days prior to surgery.  Do not shave or trim anywhere 24 hours before surgery   Wear your hair loose or down; no pony-tails, buns, or metal hair clips   Wear loose, comfortable, clean clothes   Wear glasses instead of contacts  [employer] money, valuables, and jewelry, including body piercings, at home   If you were given an [facility], bring it on day of surgery. Going Home - or Spending the Night  SAME-DAY SURGERY: You must have a responsible adult drive you home and stay with you 24 hours after surgery   ADMITS: If your doctor is keeping you in the hospital after surgery, leave personal belongings/luggage in your car until you have a hospital room number. Hospital discharge time is 12 noon  Drivers must be here before 12 noon unless you are told differently   Special Instructions It is now mandated that all surgical patients be tested for COVID-19 prior to surgery. Testing has to be exactly 4 days prior to surgery. Your COVID test date is 8/27/21 between 8:00 am and 11:00 am.       COVID testing will be performed curbside at the [facility] [address]. There will be signs leading you to the testing site. You will need to bring a photo ID with you to be swabbed. Patients are advised to self-quarantine at home after testing and prior to your surgery date. You will be notified if your results are positive.     What to watch for:   Coronavirus (COVID-19) affects different people in different ways   It also appears with a wide range of symptoms from mild to severe   Signs usually appear 2-14 days after exposure     If you develop any of the following, notify your doctor immediately:  o Fever  o Chills, with or without a shiver  o Muscle pain  o Headache  o Sore throat  o Dry cough  o New loss of taste or smell  o Tiredness      If you develop any of the following, call [phone]:  o Shortness of breath  o Difficulty breathing  o Chest pain  o New confusion  o Blueness of fingers and/or lips       Follow all instructions so your surgery wont be cancelled. Please, be on time. If a situation occurs and you are delayed the day of surgery, call  [phone]. If your physical condition changes (like a fever, cold, flu, etc.) call your surgeon. Home medication(s) reviewed and verified via   PHONE      during PAT appointment. The patient was contacted by  PHONE      The patient verbalizes understanding of all instructions and      DOES NOT   need reinforcement.

## 2021-08-27 ENCOUNTER — HOSPITAL ENCOUNTER (OUTPATIENT)
Dept: PREADMISSION TESTING | Age: 76
Discharge: HOME | End: 2021-08-27
Payer: MEDICARE

## 2021-08-27 PROCEDURE — U0005 INFEC AGEN DETEC AMPLI PROBE: HCPCS

## 2021-08-28 LAB
SARS-COV-2, XPLCVT: NOT DETECTED
SOURCE, COVRS: NORMAL

## 2021-08-31 ENCOUNTER — ANESTHESIA EVENT (OUTPATIENT)
Dept: SURGERY | Age: 76
End: 2021-08-31
Payer: MEDICARE

## 2021-09-01 ENCOUNTER — HOSPITAL ENCOUNTER (OUTPATIENT)
Age: 76
Setting detail: OUTPATIENT SURGERY
Discharge: HOME | End: 2021-09-01
Attending: ORTHOPAEDIC SURGERY | Admitting: ORTHOPAEDIC SURGERY
Payer: MEDICARE

## 2021-09-01 ENCOUNTER — ANESTHESIA (OUTPATIENT)
Dept: SURGERY | Age: 76
End: 2021-09-01
Payer: MEDICARE

## 2021-09-01 VITALS
BODY MASS INDEX: 25.51 KG/M2 | WEIGHT: 158.73 LBS | HEART RATE: 51 BPM | HEIGHT: 66 IN | RESPIRATION RATE: 12 BRPM | DIASTOLIC BLOOD PRESSURE: 65 MMHG | OXYGEN SATURATION: 100 % | SYSTOLIC BLOOD PRESSURE: 133 MMHG | TEMPERATURE: 98 F

## 2021-09-01 LAB
GLUCOSE BLD STRIP.AUTO-MCNC: 120 MG/DL (ref 65–117)
GLUCOSE BLD STRIP.AUTO-MCNC: 155 MG/DL (ref 65–117)
SERVICE CMNT-IMP: ABNORMAL
SERVICE CMNT-IMP: ABNORMAL

## 2021-09-01 PROCEDURE — 74011000250 HC RX REV CODE- 250: Performed by: ORTHOPAEDIC SURGERY

## 2021-09-01 PROCEDURE — 77030040361 HC SLV COMPR DVT MDII -B

## 2021-09-01 PROCEDURE — 76030000000 HC AMB SURG OR TIME 0.5 TO 1: Performed by: ORTHOPAEDIC SURGERY

## 2021-09-01 PROCEDURE — 77030002986 HC SUT PROL J&J -A: Performed by: ORTHOPAEDIC SURGERY

## 2021-09-01 PROCEDURE — 74011250636 HC RX REV CODE- 250/636: Performed by: ORTHOPAEDIC SURGERY

## 2021-09-01 PROCEDURE — 77030000032 HC CUF TRNQT ZIMM -B: Performed by: ORTHOPAEDIC SURGERY

## 2021-09-01 PROCEDURE — 77030040922 HC BLNKT HYPOTHRM STRY -A

## 2021-09-01 PROCEDURE — 74011250636 HC RX REV CODE- 250/636: Performed by: NURSE ANESTHETIST, CERTIFIED REGISTERED

## 2021-09-01 PROCEDURE — A4565 SLINGS: HCPCS

## 2021-09-01 PROCEDURE — 77030018836 HC SOL IRR NACL ICUM -A: Performed by: ORTHOPAEDIC SURGERY

## 2021-09-01 PROCEDURE — 82962 GLUCOSE BLOOD TEST: CPT

## 2021-09-01 PROCEDURE — 76060000061 HC AMB SURG ANES 0.5 TO 1 HR: Performed by: ORTHOPAEDIC SURGERY

## 2021-09-01 PROCEDURE — 77030006689 HC BLD OPHTH BVR BD -A: Performed by: ORTHOPAEDIC SURGERY

## 2021-09-01 PROCEDURE — 2709999900 HC NON-CHARGEABLE SUPPLY: Performed by: ORTHOPAEDIC SURGERY

## 2021-09-01 PROCEDURE — 76210000050 HC AMBSU PH II REC 0.5 TO 1 HR: Performed by: ORTHOPAEDIC SURGERY

## 2021-09-01 RX ORDER — IBUPROFEN 800 MG/1
800 TABLET ORAL
Qty: 20 TABLET | Refills: 0 | Status: SHIPPED
Start: 2021-09-01 | End: 2022-03-23

## 2021-09-01 RX ORDER — FLUMAZENIL 0.1 MG/ML
0.2 INJECTION INTRAVENOUS
Status: DISCONTINUED | OUTPATIENT
Start: 2021-09-01 | End: 2021-09-01 | Stop reason: HOSPADM

## 2021-09-01 RX ORDER — NALOXONE HYDROCHLORIDE 0.4 MG/ML
0.2 INJECTION, SOLUTION INTRAMUSCULAR; INTRAVENOUS; SUBCUTANEOUS
Status: DISCONTINUED | OUTPATIENT
Start: 2021-09-01 | End: 2021-09-01 | Stop reason: HOSPADM

## 2021-09-01 RX ORDER — SODIUM CHLORIDE, SODIUM LACTATE, POTASSIUM CHLORIDE, CALCIUM CHLORIDE 600; 310; 30; 20 MG/100ML; MG/100ML; MG/100ML; MG/100ML
125 INJECTION, SOLUTION INTRAVENOUS CONTINUOUS
Status: DISCONTINUED | OUTPATIENT
Start: 2021-09-01 | End: 2021-09-01 | Stop reason: HOSPADM

## 2021-09-01 RX ORDER — DIPHENHYDRAMINE HYDROCHLORIDE 50 MG/ML
12.5 INJECTION, SOLUTION INTRAMUSCULAR; INTRAVENOUS AS NEEDED
Status: DISCONTINUED | OUTPATIENT
Start: 2021-09-01 | End: 2021-09-01 | Stop reason: HOSPADM

## 2021-09-01 RX ORDER — LIDOCAINE HYDROCHLORIDE 10 MG/ML
0.1 INJECTION, SOLUTION EPIDURAL; INFILTRATION; INTRACAUDAL; PERINEURAL AS NEEDED
Status: DISCONTINUED | OUTPATIENT
Start: 2021-09-01 | End: 2021-09-01 | Stop reason: HOSPADM

## 2021-09-01 RX ORDER — PROPOFOL 10 MG/ML
INJECTION, EMULSION INTRAVENOUS AS NEEDED
Status: DISCONTINUED | OUTPATIENT
Start: 2021-09-01 | End: 2021-09-01 | Stop reason: HOSPADM

## 2021-09-01 RX ORDER — HYDROMORPHONE HYDROCHLORIDE 1 MG/ML
.25-1 INJECTION, SOLUTION INTRAMUSCULAR; INTRAVENOUS; SUBCUTANEOUS
Status: DISCONTINUED | OUTPATIENT
Start: 2021-09-01 | End: 2021-09-01 | Stop reason: HOSPADM

## 2021-09-01 RX ORDER — PROPOFOL 10 MG/ML
INJECTION, EMULSION INTRAVENOUS
Status: DISCONTINUED | OUTPATIENT
Start: 2021-09-01 | End: 2021-09-01 | Stop reason: HOSPADM

## 2021-09-01 RX ORDER — TRIAMCINOLONE ACETONIDE 40 MG/ML
INJECTION, SUSPENSION INTRA-ARTICULAR; INTRAMUSCULAR AS NEEDED
Status: DISCONTINUED | OUTPATIENT
Start: 2021-09-01 | End: 2021-09-01 | Stop reason: HOSPADM

## 2021-09-01 RX ADMIN — PROPOFOL 80 MCG/KG/MIN: 10 INJECTION, EMULSION INTRAVENOUS at 12:26

## 2021-09-01 RX ADMIN — PROPOFOL 100 MG: 10 INJECTION, EMULSION INTRAVENOUS at 12:26

## 2021-09-01 RX ADMIN — CEFAZOLIN SODIUM 2 G: 1 POWDER, FOR SOLUTION INTRAMUSCULAR; INTRAVENOUS at 12:17

## 2021-09-01 NOTE — BRIEF OP NOTE
Brief Postoperative Note    Patient: Ginnie Dakin  YOB: 1945  MRN: [id number]    Date of Procedure: 9/1/2021     Pre-Op Diagnosis: Trigger finger, left ring finger [M65.342]  Trigger ring finger of right hand [M65.341]    Post-Op Diagnosis: Same as preoperative diagnosis. Procedure(s):  LEFT RING FINGER TRIGGER RELEASE, RIGHT RING FINGER TRIGGER INJECTION (LOCAL W/MAC)    Surgeon(s):   Anshul Gonzales MD    Surgical Assistant: Physician Assistant: SANGEETHA Chiu    Anesthesia: MAC     Estimated Blood Loss (mL): Minimal    Complications: None    Specimens: * No specimens in log *     Implants: * No implants in log *    Drains: * No LDAs found *    Findings: stenosing tenosynovitis left ring finger    Electronically Signed by SANGEETHA George on 9/1/2021 at 12:47 PM

## 2021-09-01 NOTE — ANESTHESIA PREPROCEDURE EVALUATION
Relevant Problems   No relevant active problems       Anesthetic History   No history of anesthetic complications            Review of Systems / Medical History  Patient summary reviewed, nursing notes reviewed and pertinent labs reviewed    Pulmonary    COPD: mild               Neuro/Psych              Cardiovascular    Hypertension: well controlled          Hyperlipidemia    Exercise tolerance: >4 METS  Comments: P/W anginal symptoms last year and had extensive cardiac w/u which revealed no cardiac abnormality, normal perfusion, negative stress test and ekg       GI/Hepatic/Renal     GERD: well controlled           Endo/Other    Diabetes: poorly controlled, type 2  Hypothyroidism: well controlled  Arthritis     Other Findings              Physical Exam    Airway  Mallampati: II  TM Distance: 4 - 6 cm  Neck ROM: normal range of motion   Mouth opening: Normal     Cardiovascular  Regular rate and rhythm,  S1 and S2 normal,  no murmur, click, rub, or gallop             Dental    Dentition: Lower dentition intact and Upper dentition intact     Pulmonary  Breath sounds clear to auscultation               Abdominal         Other Findings            Anesthetic Plan    ASA: 2  Anesthesia type: MAC          Induction: Intravenous  Anesthetic plan and risks discussed with: Patient

## 2021-09-01 NOTE — OP NOTES
[facility]  OPERATIVE REPORT    Name:  Brandy Meza  MR#:  [id number]  :  1945  ACCOUNT #:  [de-identified]  DATE OF SERVICE:  2021    PREOPERATIVE DIAGNOSES:  1. Left ring finger trigger. 2.  Right ring finger trigger. POSTOPERATIVE DIAGNOSES:  1. Left ring finger trigger. 2.  Right ring finger trigger. PROCEDURES PERFORMED:  1. Left ring finger A1 pulley release. 2.  Right ring finger A1 pulley injection with Kenalog. SURGEON:  Jennifer Tee MD    ASSISTANT:  SANGEETHA Baez    ANESTHESIA:  IV sedation and digital block, left ring finger. COMPLICATIONS:  None. SPECIMENS REMOVED:  none. IMPLANTS:  none. ESTIMATED BLOOD LOSS:  Zero. TOURNIQUET TIME:  10 minutes. INDICATIONS:  The patient is a 73-year-old female with a history of triggering of bilateral ring fingers, left worse than right. She was counseled regarding surgical and nonsurgical treatment options and elected to proceed with the procedure as above. The risks and benefits were discussed in detail. PROCEDURE:  The patient was taken to the operating room and placed supine on the operating table. Following administration of IV sedation, a digital block to the left ring finger was performed with 0.5% Marcaine and 1% lidocaine half and half approximately 10 mL. Left hand and arm prepped and draped in sterile fashion with Hibiclens and alcohol. Tourniquet applied to the left proximal arm. The arm was exsanguinated with an Esmarch bandage and tourniquet inflated to 250 mmHg. Incision was made transversely distal to the distal palmar crease and proximal to the palmar digital crease at the base of the ring finger. Subcutaneous dissection was carried out and neurovascular bundles to the ring finger identified and protected. The A1 pulley was then completely released. Tenosynovectomy performed. No triggering remained.   The wound was copiously irrigated with sterile saline. Hemostasis achieved with bipolar cautery and the wound repaired using 5-0 Prolene simple sutures. A sterile bulky hand dressing was applied and tourniquet let down. An injection was given to the right ring finger A1 pulley under sterile conditions using 40 mg of Kenalog. During the procedure, a physician assistant was vital to the outcome of the case providing stability to the arm, retraction of crucial structures and assistance with wound closure. DISCHARGE PLAN:  The patient is instructed to keep arm elevated, clean and dry at all times, to call with any question or concerns. Follow up in 10 days for suture removal, wound check and hand therapy referral if indicated.       Ivana Osborn MD      [id number]  D:  09/01/2021 14:30  T:  09/01/2021 18:24  JOB #:  [id number]

## 2021-09-01 NOTE — DISCHARGE INSTRUCTIONS
Anderson Makos Dr. Sterling Finders, MD Dr. Darcel Meo, MD Dr. Verdis Essex. Bud Arteaga MD    You have undergone surgery by one of our hand specialists. Please follow these instructions to ensure a safe and speedy recovery. 1. SURGICAL DRESSING (bandage): Your bandage should be kept dry and in place until you return to the office for your follow up visit. This helps to guard against infection. [x]         You can shower if you place a plastic bag over your dressing.    []         You will need to sponge bathe until you are seen in the office. 2. ELEVATION:  It is VERY IMPORTANT that you keep your arm and hand above the level of your heart at all times, awake or asleep. The higher you elevate your hand, the less it will swell, and the less it will hurt. It is best to elevate the hand as shown below:              Laying down, especially at night,  with your arm elevated on pillows help keep your shoulder and elbow from getting stiff. 3. Ice bags / ice packs can be used to help control pain. If you make your own ice bag, double-bagging helps to prevent leaks. 4. MEDICATIONS:  You have been given a prescription for pain medications. Take it according to the instructions on the bottle. DO NOT drink alcohol while taking pain medications. DO NOT drive, operate heavy machinery, or make important personal or business decisions since the medications will make you drowsy. We do NOT refill prescriptions over the weekend. Please arrange to call for prescription refills during regular office hours. PRESCRIPTION SENT TO:  [facility]      5. APPOINTMENT:  Your post operative appointment has been made for the following time:    TIME:     1:45pm        DATE:     9/13/21         At the:       [x]  [address] [facility] Office    6.  AFTER GENERAL ANESTHESIA or IV SEDATION:   DO NOT drink alcohol or drive, work around [place] [address], or make important personal or business decisions. Limit your activity for 24 hours. Resume your diet with light foods (Jell-o ®, clear soups, clear fluids, caffeine-free drinks). If you do not have any nausea, you may resume your regular diet. We at [address] [place] [address] want your surgery and recovery to be as comfortable and successful as possible. Should you have problems, please call our office during the morning hours. In particular, call if your pain is not adequately controlled by prescribed medication, temperature is over 101 degrees, or your bandage is wet or has a four odor. Your doctor contact information:   Romel Tania [phone]  Hannah Po, ext [phone] [facility] END OFFICE - [address] [facility], [address]  [place], [address] [place] [address] SUMMARY from your Nurse      PATIENT INSTRUCTIONS    After general anesthesia or intravenous sedation, for 24 hours or while taking prescription Narcotics:  · Limit your activities  · Do not drive and operate hazardous machinery  · Do not make important personal or business decisions  · Do  not drink alcoholic beverages  · If you have not urinated within 8 hours after discharge, please contact your surgeon on call. Report the following to your surgeon:  · Excessive pain, swelling, redness or odor of or around the surgical area  · Temperature over 100.5  · Nausea and vomiting lasting longer than 4 hours or if unable to take medications  · Any signs of decreased circulation or nerve impairment to extremity: change in color, persistent  numbness, tingling, coldness or increase pain  · Any questions      GOOD HELP TO FIGHT AN INFECTION  Here are a few tip to help reduce the chance of getting an infection after surgery:   Wash Your Hands   Good handwashing is the most important thing you and your caregiver can do.  Wash before and after caring for any wounds. Dry your hand with a clean towel.    Wash with soap and water for at least 20 seconds. A TIP: sing the \"Happy Birthday\" song through one time while washing to help with the timing.  Use a hand  in between washings.  Shower   When your surgeon says it is OK to take a shower, use a new bar of antibacterial soap (if that is what you use, and keep that bar of soap ONLY for your use), or antibacterial body wash.  Use a clean wash cloth or sponge when you bathe.  Dry off with a clean towel  after every bath - be careful around any wounds, skin staples, sutures or surgical glue over/on wounds.  Do not enter swimming pools, hot tubs, lakes, rivers and/or ocean until wounds are healed and your doctor/surgeon says it is OK.  Use Clean Sheets   Sleep on freshly laundered sheets after your surgery.  Keep the surgery site covered with a clean, dry bandage (if instructed to do so). If the bandage becomes soiled, reapply a new, dry, clean bandage.  Do not allow pets to sleep with you while your wound is healing.  Lifestyle Modification and Controlling Your Blood Sugar   Smoking slows wound healing. Stop smoking and limit exposure to second-hand smoke.  High blood sugar slows wound healing. Eat a well-balanced diet to provide proper nutrition while healing   Monitor your blood sugar (if you are a diabetic) and take your medications as you are suppose to so you can control you blood sugar after surgery. COUGH AND DEEP BREATHE    Breathing deeply and coughing are very important exercises to do after surgery. Deep breathing and coughing open the little air tubes and air sacks in your lungs. You take deep breaths every day. You may not even notice - it is just something you do when you sigh or yawn. It is a natural exercise you do to keep these air passages open. After surgery, take deep breaths and cough, on purpose. DIRECTIONS:  · Take 10 to 15 slow deep breaths every hour while awake.   · Breathe in deeply, and hold it for 2 seconds. · Exhale slowly through puckered lips, like blowing up a balloon. · After every 4th or 5th deep breath, hug your pillow to your chest or belly and give a hard, deep cough. Yes, it will probably hurt. But doing this exercise is a very important part of healing after surgery. Take your pain medicine to help you do this exercise without too much pain. Coughing and deep breathing help prevent bronchitis and pneumonia after surgery. If you had chest or belly surgery, use a pillow as a \"hug ana m\" and hold it tightly to your chest or belly when you cough. ANKLE PUMPS    Ankle pumps increase the circulation of oxygenated blood to your lower extremities and decrease your risk for circulation problems such as blood clots. They also stretch the muscles, tendons and ligaments in your foot and ankle, and prevent joint contracture in the ankle and foot, especially after surgeries on the legs. It is important to do ankle pump exercises regularly after surgery because immobility increases your risk for developing a blood clot. Your doctor may also have you take an Aspirin for the next few days as well. If your doctor did not ask you to take an Aspirin, consult with him before starting Aspirin therapy on your own. The exercise is quite simple. · Slowly point your foot forward, feeling the muscles on the top of your lower leg stretch, and hold this position for 5 seconds. · Next, pull your foot back toward you as far as possible, stretching the calf muscles, and hold that position for 5 seconds. · Repeat with the other foot. · Perform 10 repetitions every hour while awake for both ankles if possible (down and then up with the foot once is one repetition). You should feel gentle stretching of the muscles in your lower leg when doing this exercise. If you feel pain, or your range of motion is limited, don't push too hard.   Only go the limit your joint and muscles will let you go. If you have increasing pain, progressively worsening leg warmth or swelling, STOP the exercise and call your doctor. MEDICATION AND   SIDE EFFECT GUIDE    The [employer] MEDICATION AND SIDE EFFECT GUIDE was provided to the PATIENT AND CARE PROVIDER. Information provided includes instruction about drug purpose and common side effects for the following medications:   · IBUPROFEN      These are general instructions for a healthy lifestyle:    *   Please give a list of your current medications to your Primary Care Provider. *   Please update this list whenever your medications are discontinued, doses are changed, or new medications (including over-the-counter products) are added. *   Please carry medication information at all times in case of emergency situations. About Smoking  No smoking / No tobacco products  Avoid exposure to second hand smoke     Surgeon General's Warning:  Quitting smoking now greatly reduces serious risk to your health. Obesity, smoking, and sedentary lifestyle greatly increases your risk for illness and disease. A healthy diet, regular physical exercise & weight monitoring are important for maintaining a healthy lifestyle. Congestive Heart Failure  You may be retaining fluid if you have a history of heart failure or if you experience any of the following symptoms:  Weight gain of 3 pounds or more overnight or 5 pounds in a week, increased swelling in your hands or feet or shortness of breath while lying flat in bed. Please call your doctor as soon as you notice any of these symptoms; do not wait until your next office visit. Recognize signs and symptoms of STROKE:  F -  Face looks uneven  A -  Arms unable to move or move evenly  S -  Speech slurred or non-existent  T -  Time-call 911 as soon as signs and symptoms begin-DO NOT go          back to bed or wait to see if you get better-TIME IS BRAIN.       Warning Signs of HEART ATTACK Call 911 if you have these symptoms:     Chest discomfort. Most heart attacks involve discomfort in the center of the chest that lasts more than a few minutes, or that goes away and comes back. It can feel like uncomfortable pressure, squeezing, fullness, or pain.  Discomfort in other areas of the upper body. Symptoms can include pain or discomfort in one or both arms, the back, neck, jaw, or stomach.  Shortness of breath with or without chest discomfort.  Other signs may include breaking out in a cold sweat, nausea, or lightheadedness. Don't wait more than five minutes to call 911 - MINUTES MATTER! Fast action can save your life. Calling 911 is almost always the fastest way to get lifesaving treatment. Emergency Medical Services staff can begin treatment when they arrive -- up to an hour sooner than if someone gets to the hospital by car. Learning About Coronavirus [phone])  Coronavirus [phone]): Overview  What is coronavirus (COVID-19)? The coronavirus disease (COVID-19) is caused by a virus. It is an illness that was first found in [place], [place], in December 2019. It has since spread worldwide. The virus can cause fever, cough, and trouble breathing. In severe cases, it can cause pneumonia and make it hard to breathe without help. It can cause death. Coronaviruses are a large group of viruses. They cause the common cold. They also cause more serious illnesses like Middle East respiratory syndrome (MERS) and severe acute respiratory syndrome (SARS). COVID-19 is caused by a novel coronavirus. That means it's a new type that has not been seen in people before. This virus spreads person-to-person through droplets from coughing and sneezing. It can also spread when you are close to someone who is infected. And it can spread when you touch something that has the virus on it, such as a doorknob or a tabletop. What can you do to protect yourself from coronavirus (COVID-19)?   The best way to protect yourself from getting sick is to:  · Avoid areas where there is an outbreak. · Avoid contact with people who may be infected. · Wash your hands often with soap or alcohol-based hand sanitizers. · Avoid crowds and try to stay at least 6 feet away from other people. · Wash your hands often, especially after you cough or sneeze. Use soap and water, and scrub for at least 20 seconds. If soap and water aren't available, use an alcohol-based hand . · Avoid touching your mouth, nose, and eyes. What can you do to avoid spreading the virus to others? To help avoid spreading the virus to others:  · Cover your mouth with a tissue when you cough or sneeze. Then throw the tissue in the trash. · Use a disinfectant to clean things that you touch often. · Stay home if you are sick or have been exposed to the virus. Don't go to school, work, or public areas. And don't use public transportation. · If you are sick:  ? Leave your home only if you need to get medical care. But call the doctor's office first so they know you're coming. And wear a face mask, if you have one.  ? If you have a face mask, wear it whenever you're around other people. It can help stop the spread of the virus when you cough or sneeze. ? Clean and disinfect your home every day. Use household  and disinfectant wipes or sprays. Take special care to clean things that you grab with your hands. These include doorknobs, remote controls, phones, and handles on your refrigerator and microwave. And don't forget countertops, tabletops, bathrooms, and computer keyboards. When to call for help  Call 911 anytime you think you may need emergency care. For example, call if:  · You have severe trouble breathing. (You can't talk at all.)  · You have constant chest pain or pressure. · You are severely dizzy or lightheaded. · You are confused or can't think clearly. · Your face and lips have a blue color.   · You pass out (lose consciousness) or are very hard to wake up. Call your doctor now if you develop symptoms such as:  · Shortness of breath. · Fever. · Cough. If you need to get care, call ahead to the doctor's office for instructions before you go. Make sure you wear a face mask, if you have one, to prevent exposing other people to the virus. Where can you get the latest information? The following health organizations are tracking and studying this virus. Their websites contain the most up-to-date information. Rasheeda Urena also learn what to do if you think you may have been exposed to the virus. · U.S. Centers for Disease Control and Prevention (CDC): The CDC provides updated news about the disease and travel advice. The website also tells you how to prevent the spread of infection. www.cdc.gov  · World Health Organization [facility]): WHO offers information about the virus outbreaks. WHO also has travel advice. www.who.int  Current as of: April 1, 2020               Content Version: 12.4  © 2006-2020 [employer]. Care instructions adapted under license by your healthcare professional. If you have questions about a medical condition or this instruction, always ask your healthcare professional. [place] [address] any warranty or liability for your use of this information. The discharge information has been reviewed with the patient and daughter. Any questions and concerns from the patient and daughter have been addressed. The patient and daughter verbalized understanding.         CONTENTS FOUND IN YOUR DISCHARGE ENVELOPE:  [x]     Surgeon and Hospital Discharge Instructions  [x]     [facility] Care Provider Card  [x]     Medication & Side Effect Guide            (your newly prescribed medications have been marked/highlighted showing the most common side effects from   the classes of drugs on your prescriptions)  [x]     Medication Prescription(s) x 1 (IBUPROFEN) ( [x] These have been sent electronically to your pharmacy ([facility] [address]) by your surgeon,   - OR -       your surgeon has already provided these to you during a previous/pre-op office visit)  []     [address]  []     Physical Therapy Prescription  [x]     Follow-up Appointment Cards (FOLLOW-UP APPT: 09/13/21 at 1:45pm-[place] location)  []     Surgery-related Pictures/Media  []     Pain block and/or block with On-Q Catheter from Anesthesia Service (information included in your instructions above)  []     Medical device information sheets/pamphlets from their    []     School/work excuse note. []     /parent work excuse note. The following personal items collected during your admission are returned to you:   Dental Appliance: Dental Appliances: None  Vision: Visual Aid: Glasses  Hearing Aid:    Jewelry: Jewelry: None  Clothing: Clothing: Footwear, Pants, Shirt, Undergarments  Other Valuables: Other Valuables: Eyeglasses (placed with belongings)n/a  Valuables sent to safe:   Cold Therapy Instructions    Your nurse will provide a cold therapy wrap based upon your surgery, need, and your doctor's orders. INSTRUCTIONS FOR USE:  All cooling wraps produce sustained periods of intense cold. NEVER PLACE PAD ON BARE SKIN OR HAVE CONTACT WITH BARE SKIN  Always Use An Insulating Barrier. Tissue injury can occur if these devices are used improperly. Follow your surgeons instructions carefully regarding the frequency, duration and breaks from cold therapy, and the total length of treatment. Check under the pad barrier every 2 hours for skin injury (see below.)      SIGNS OF SKIN INJURY:  STOP USE AND CONTACT YOUR SURGEON if any of the following occur: Increased pain, burning, increased swelling, itching, blisters, increased redness, discoloration, welts, or other change in skin condition. INDICATIONS:  Back, Hip, Knee or Shoulder Surgery and Post-Operative Treatment; Trauma;  Orthopedic Rehabilitation      CONTRAINDICATIONS:  Cold therapy should not be used by persons with Diabetes, Raynaud's or other vasospastic disease, cold hypersensititvity, or compromised local circulation. Certain medical conditions make cold-induced injury more likely. Please consult with your healthcare provider before use.

## 2021-09-01 NOTE — ANESTHESIA POSTPROCEDURE EVALUATION
Procedure(s):  LEFT RING FINGER TRIGGER RELEASE, RIGHT RING FINGER TRIGGER INJECTION ([facility]). MAC    Anesthesia Post Evaluation      Multimodal analgesia: multimodal analgesia used between 6 hours prior to anesthesia start to PACU discharge  Patient location during evaluation: PACU  Patient participation: complete - patient participated  Level of consciousness: awake and alert  Pain management: adequate  Airway patency: patent  Anesthetic complications: no  Cardiovascular status: acceptable  Respiratory status: acceptable  Hydration status: acceptable  Post anesthesia nausea and vomiting:  none  Final Post Anesthesia Temperature Assessment:  Normothermia (36.0-37.5 degrees C)      INITIAL Post-op Vital signs:   Vitals Value Taken Time   /68 09/01/21 1310   Temp 36.7 °C (98 °F) 09/01/21 1255   Pulse 50 09/01/21 1313   Resp 13 09/01/21 1313   SpO2 100 % 09/01/21 1313   Vitals shown include unvalidated device data.

## 2021-09-01 NOTE — H&P
Date of Surgery Update:  Thalia Duggan was seen and examined. History and physical has been reviewed. The patient has been examined.  There have been no significant clinical changes since the completion of the originally dated History and Physical.    Signed By: SANGEETHA Xavier     September 1, 2021 12:12 PM

## 2021-09-22 RX ORDER — AMLODIPINE BESYLATE 5 MG/1
TABLET ORAL
Qty: 90 TABLET | Refills: 3 | Status: SHIPPED | OUTPATIENT
Start: 2021-09-22 | End: 2022-03-23

## 2022-03-19 PROBLEM — I10 HYPERTENSION: Status: ACTIVE | Noted: 2021-05-26

## 2022-03-19 PROBLEM — R55 SYNCOPE AND COLLAPSE: Status: ACTIVE | Noted: 2021-05-26

## 2022-03-19 PROBLEM — E11.9 DM TYPE 2 (DIABETES MELLITUS, TYPE 2) (HCC): Status: ACTIVE | Noted: 2021-05-26

## 2022-03-20 PROBLEM — R07.9 CHEST PAIN AT REST: Status: ACTIVE | Noted: 2021-05-26

## 2022-03-22 NOTE — PROGRESS NOTES
CARDIOLOGY OFFICE NOTE    Mike Lara MD, [address], [place], [address] [facility]  Phone [phone]; Fax [phone]  Mobile [phone]   Voice Mail [phone]    LAST OFFICE VISIT : 6/2/2021  Samina Jackson MD       ATTENTION:   This medical record was transcribed using an electronic medical records/speech recognition system. Although proofread, it may and can contain electronic, spelling and other errors. Corrections may be executed at a later time. Please feel free to contact us for any clarifications as needed. ICD-10-CM ICD-9-CM   1. Dizziness  R42 780.4   2. SOB (shortness of breath)  R06.02 786.05   3. Essential hypertension  I10 401.9            Samira Garcia is a 76 y.o. female with  referred for cardiac preoperative risk stratification for noncardiac surgery. Cardiac risk factors: smoking/ tobacco exposure, family history, dyslipidemia, diabetes mellitus, hypertension, post-menopausal.  I have personally obtained the history from the patient. HISTORY OF PRESENTING ILLNESS      Samira Garcia is a 76 y.o. female who has a history of diabetes type 2 hypertension and asthma and has been doing well since I last saw her for 1 episode of jaw discomfort that woke her from sleep and on her heart monitor she thought she was in atrial fibrillation. She does have elevated calcium score and initially her LDL was not at goal with the high limits come down to 40. She is most concerned about the jaw pain and prior to any vascular surgery she will be a heart catheterization. I am concerned she has significant disease and has equalization oft contrast at the time of the nuclear study that came back as negative.            ACTIVE PROBLEM LIST     Patient Active Problem List    Diagnosis Date Noted    Syncope and collapse 05/26/2021    Chest pain at rest 05/26/2021    Hypertension 05/26/2021    DM type 2 (diabetes mellitus, type 2) ([facility] [address] 2021           PAST MEDICAL HISTORY     Past Medical History:   Diagnosis Date    Adverse effect of anesthesia     difficulty waking with anesthesia     Arthritis     Asthma     Diabetes mellitus, type 2 ([facility] [address]     Emphysema (subcutaneous) (surgical) resulting from a procedure     GERD (gastroesophageal reflux disease)     Hyperlipidemia     Hypertension     Osteoarthritis     Osteoporosis     Thyroid cancer ([facility] [address]            PAST SURGICAL HISTORY     Past Surgical History:   Procedure Laterality Date    HX APPENDECTOMY      HX  SECTION      times two    HX CYST REMOVAL      right hand removed    HX DILATION AND CURETTAGE      HX ORTHOPAEDIC      broken wrist left put a plate in     HX THYROIDECTOMY      HX TONSILLECTOMY      HX TOTAL ABDOMINAL HYSTERECTOMY            ALLERGIES     Allergies   Allergen Reactions    Adhesive Rash    Ciprofloxacin-Dexamethasone Other (comments)     Headache    Codeine Nausea Only    Diflucan [Fluconazole] Other (comments)     Severe Headache    Fenofibrate Unknown (comments)     Cannot recall reaction    Statins-Hmg-Coa Reductase Inhibitors Myalgia    Sulfa (Sulfonamide Antibiotics) Rash          FAMILY HISTORY     Family History   Problem Relation Age of Onset    Asthma Father     Hypertension Father     Hypertension Mother     Stroke Mother     Cancer Sister         thyroid    negative for cardiac disease       SOCIAL HISTORY     Social History     Socioeconomic History    Marital status:    Tobacco Use    Smoking status: Former Smoker    Smokeless tobacco: Never Used    Tobacco comment: quit 27 yrs ago   Vaping Use    Vaping Use: Never used   Substance and Sexual Activity    Alcohol use: No    Drug use: No    Sexual activity: Yes         MEDICATIONS     Current Outpatient Medications   Medication Sig    quinapriL (ACCUPRIL) 20 mg tablet Take 20 mg by mouth two (2) times a day.     esomeprazole (NexIUM) 20 mg capsule Take by mouth two (2) times a day.  alirocumab (Praluent Pen) 75 mg/mL injector pen 75 mg by SubCUTAneous route Once every 2 weeks.  denosumab (Prolia) 60 mg/mL injection 60 mg by SubCUTAneous route every 6 months.  amLODIPine (NORVASC) 5 mg tablet TAKE 1 TABLET BY MOUTH EVERY DAY    ferrous sulfate 325 mg (65 mg iron) tablet Take 325 mg by mouth Daily (before breakfast).  magnesium oxide 500 mg tab Take 400 mg by mouth daily.  Saccharomyces boulardii (Florastor) 250 mg capsule Take 250 mg by mouth two (2) times a day.  umeclidinium (Incruse Ellipta) 62.5 mcg/actuation inhaler Take 1 Puff by inhalation daily.  cholecalciferol (Vitamin D3) (1000 Units /25 mcg) tablet Take 5,000 Units by mouth daily.  montelukast (SINGULAIR) 10 mg tablet Take 10 mg by mouth daily.  nateglinide (STARLIX) 120 mg tablet Take 120 mg by mouth Before breakfast, lunch, and dinner. Twice a day if no lunch    levothyroxine (Synthroid) 100 mcg tablet Take 100 mcg by mouth Daily (before breakfast).  liothyronine (CYTOMEL) 25 mcg tablet Take 12.5 mcg by mouth Daily (before breakfast).  ZETIA 10 mg tablet Take 10 mg by mouth daily.  nabumetone (RELAFEN) 500 mg tablet Take 1,000 mg by mouth two (2) times a day.  aspirin 81 mg chewable tablet Take 81 mg by mouth daily.  cyanocobalamin, vitamin B-12, 5,000 mcg cap Take 5,000 mcg by mouth daily.  levalbuterol tartrate (XOPENEX HFA) 45 mcg/actuation inhaler Take 2 Puffs by inhalation every four (4) hours as needed for Shortness of Breath. No current facility-administered medications for this visit. I have reviewed the nurses notes, vitals, problem list, allergy list, medical history, family, social history and medications. REVIEW OF SYMPTOMS   Pertinent positive per HPI   General: Pt denies excessive weight gain or loss.  Pt is able to conduct ADL's  HEENT: Denies blurred vision, headaches, hearing loss, epistaxis and difficulty swallowing. Respiratory: Denies cough, congestion, shortness of breath, YOUNGER, wheezing or stridor. Cardiovascular: Denies precordial pain, palpitations, edema or PND  Gastrointestinal: Denies poor appetite, indigestion, abdominal pain or blood in stool  Genitourinary: Denies hematuria, dysuria, increased urinary frequency  Musculoskeletal: Denies joint pain or swelling from muscles or joints  Neurologic: Denies tremor, paresthesias, headache, or sensory motor disturbance  Psychiatric: Denies confusion, insomnia, depression  Integumentray: Denies rash, itching or ulcers. Hematologic: Denies easy bruising, bleeding     PHYSICAL EXAMINATION      Vitals:    03/23/22 1020   BP: (!) 160/70   Pulse: 72   Weight: 162 lb 12.8 oz (73.8 kg)   Height: 5' 6\" (1.676 m)     General: Well developed, in no acute distress. HEENT: No jaundice, oral mucosa moist, no oral ulcers  Neck: Supple, no stiffness, no lymphadenopathy, supple  Heart:  Normal S1/S2 negative S3 or S4. Regular, no murmur, gallop or rub, no jugular venous distention  Respiratory: Clear bilaterally x 4, no wheezing or rales  Extremities:  No edema, normal cap refill, no cyanosis. Musculoskeletal: No clubbing, no deformities  Neuro: A&Ox3, speech clear, gait stable, cooperative, no focal neurologic deficits  Skin: Skin color is normal. No rashes or lesions. Non diaphoretic, moist.             DIAGNOSTIC DATA     1. Stress Test   5/28/21-Lexiscan-no ischemia, EF 72%     2. Echo   5/27/21-EF 60%, mild PI     3. CTA Chest   5/26/21-No acute process or evidence of pulmonary embolism. Emphysematous   Changes. 4. Lipids   2/10/21- , HDL 43, ,   1/19/22- , HDL 50, LDL 40,     5.  Calcium Score  7/22/21- LM 0  LAD 22.4  LCirc 0  RCA 39.4    Total 61.8         LABORATORY DATA            Lab Results   Component Value Date/Time    WBC 5.1 05/28/2021 06:52 AM    HGB 11.8 05/28/2021 06:52 AM    HCT 35.7 05/28/2021 06:52 AM    PLATELET 443 05/28/2021 06:52 AM    MCV 91.5 05/28/2021 06:52 AM      Lab Results   Component Value Date/Time    Sodium 140 05/28/2021 06:52 AM    Potassium 4.2 05/28/2021 06:52 AM    Chloride 111 (H) 05/28/2021 06:52 AM    CO2 26 05/28/2021 06:52 AM    Anion gap 3 (L) 05/28/2021 06:52 AM    Glucose 152 (H) 05/28/2021 06:52 AM    BUN 14 05/28/2021 06:52 AM    Creatinine 0.81 05/28/2021 06:52 AM    BUN/Creatinine ratio 17 05/28/2021 06:52 AM    GFR est AA >60 05/28/2021 06:52 AM    GFR est non-AA >60 05/28/2021 06:52 AM    Calcium 8.7 05/28/2021 06:52 AM    Bilirubin, total 0.4 05/28/2021 06:52 AM    Alk. phosphatase 62 05/28/2021 06:52 AM    Protein, total 5.9 (L) 05/28/2021 06:52 AM    Albumin 2.9 (L) 05/28/2021 06:52 AM    Globulin 3.0 05/28/2021 06:52 AM    A-G Ratio 1.0 (L) 05/28/2021 06:52 AM    ALT (SGPT) 23 05/28/2021 06:52 AM      ECG: 3/23/2022: Normal sinus rhythm     ASSESSMENT/RECOMMENDATIONS:.      1. Presyncope  -She did not wear an event loop monitor in the past but on the monitor she is using on her cell phone it does appear that she may be going in A. Fib  -I think to verify this time and put a event loop monitor on her  2. Dyslipidemia on Zetia   -LDL goal is under 100  -Calcium score was elevated in the 60s. -She is now on Praluent and her LDL is down to 40.  3. Hypertension  -Blood pressure is elevated and I will increase her amlodipine to 10 mg a day  -Recheck her blood pressure at the time of heart catheterization  4. Cardiac preoperative risk stratification for noncardiac surgery and will be a vascular surgery  -Recent cardiac catheterization demonstrated only a 50% RCA lesion. So from a cardiac standpoint she may proceed with surgery with a low to intermediate cardiac risk. No further cardiac testing is indicated.     Orders Placed This Encounter    AMB POC EKG ROUTINE W/ 12 LEADS, INTER & REP     Order Specific Question:   Reason for Exam:     Answer:   preop    quinapriL (ACCUPRIL) 20 mg tablet Sig: Take 20 mg by mouth two (2) times a day.  esomeprazole (NexIUM) 20 mg capsule     Sig: Take  by mouth two (2) times a day.  alirocumab (Praluent Pen) 75 mg/mL injector pen     Si mg by SubCUTAneous route Once every 2 weeks.  denosumab (Prolia) 60 mg/mL injection     Si mg by SubCUTAneous route every 6 months. We discussed the expected course, resolution and complications of the diagnosis(es) in detail. Medication risks, benefits, costs, interactions, and alternatives were discussed as indicated. I advised him to contact the office if his condition worsens, changes or fails to improve as anticipated. He expressed understanding with the diagnosis(es) and plan          Follow-up and Dispositions  ·   Return in about 6 months (around 2022). I have discussed the diagnosis with  Cornell Ramirez and the intended plan as seen in the above orders. Questions were answered concerning future plans. I have discussed medication side effects and warnings with the patient as well. Thank you,  Lea Inman MD for involving me in the care of  Cornell Ramirez. Please do not hesitate to contact me for further questions/concerns. Mike Pride MD, [address] [place] [address] Rd[address]      [facility], [address] [place] [address]     [address]      [phone] / [phone] Fax

## 2022-03-22 NOTE — PATIENT INSTRUCTIONS
1) secondary to the jaw pain you had that awoke you from sleep and there is a question of atrial fibrillation I think we should go forward with cardiac catheterization. We will do a left heart catheterization the next week or so    2) because of the monitor you are using your phone it did detect what appears to be A. fib but the baseline was poor so I favor going ahead with a loop monitor on you. 3) your systolic blood pressure remains elevated 167 increase your Norvasc which is also your amlodipine from 5 to 10 mg.    4) stable check your blood pressure at the time of your heart catheterization    Follow-up with me after heart catheterization      It is  my pleasure to have the opportunity to be involved in taking care of you and to provide you the best cardiac care. At the end of every visit I  encourage you to eat healthy and clean and reduce your red meat intake as well as exercise 30 minutes 5 days a week to ensure a healthy heart. If you are a smoker , it will be essential that you stop smoking to reduce your risk of heart disease. Part of providing you the best heart care possible IS AN ACCURATE KNOWLEDGE OF YOUR MEDICINE. It  will be  essential at each office visit that you provide me with an accurate list of your medicines. When you come into the office you should have that list or another option is lining up your pill bottles  and taking a snapshot with your cell phone of all the medicines you are taking currently and show it to the nurse in the examining room. Inaccurate reporting of your medication to the nurse may lead to adverse events and medical errors. Thank you again for giving me the opportunity to be part of your heart care and it is my pleasure. All the best,    Mike Jaimes MD

## 2022-03-22 NOTE — H&P (VIEW-ONLY)
CARDIOLOGY OFFICE NOTE    Mike Baird MD, [address], [place], [address] [facility]  Phone [phone]; Fax [phone]  Mobile [phone]   Voice Mail [phone]    LAST OFFICE VISIT : 6/2/2021  Nehemiah Sargent MD       ATTENTION:   This medical record was transcribed using an electronic medical records/speech recognition system. Although proofread, it may and can contain electronic, spelling and other errors. Corrections may be executed at a later time. Please feel free to contact us for any clarifications as needed. ICD-10-CM ICD-9-CM   1. Dizziness  R42 780.4   2. SOB (shortness of breath)  R06.02 786.05   3. Essential hypertension  I10 401.9            Brannon Summers is a 76 y.o. female with  referred for cardiac preoperative risk stratification for noncardiac surgery. Cardiac risk factors: smoking/ tobacco exposure, family history, dyslipidemia, diabetes mellitus, hypertension, post-menopausal.  I have personally obtained the history from the patient. HISTORY OF PRESENTING ILLNESS      Brannon Summers is a 76 y.o. female who has a history of diabetes type 2 hypertension and asthma and has been doing well since I last saw her for 1 episode of jaw discomfort that woke her from sleep and on her heart monitor she thought she was in atrial fibrillation. She does have elevated calcium score and initially her LDL was not at goal with the high limits come down to 40. She is most concerned about the jaw pain and prior to any vascular surgery she will be a heart catheterization. I am concerned she has significant disease and has equalization oft contrast at the time of the nuclear study that came back as negative.            ACTIVE PROBLEM LIST     Patient Active Problem List    Diagnosis Date Noted    Syncope and collapse 05/26/2021    Chest pain at rest 05/26/2021    Hypertension 05/26/2021    DM type 2 (diabetes mellitus, type 2) ([facility] [address] 2021           PAST MEDICAL HISTORY     Past Medical History:   Diagnosis Date    Adverse effect of anesthesia     difficulty waking with anesthesia     Arthritis     Asthma     Diabetes mellitus, type 2 ([facility] [address]     Emphysema (subcutaneous) (surgical) resulting from a procedure     GERD (gastroesophageal reflux disease)     Hyperlipidemia     Hypertension     Osteoarthritis     Osteoporosis     Thyroid cancer ([facility] [address]            PAST SURGICAL HISTORY     Past Surgical History:   Procedure Laterality Date    HX APPENDECTOMY      HX  SECTION      times two    HX CYST REMOVAL      right hand removed    HX DILATION AND CURETTAGE      HX ORTHOPAEDIC      broken wrist left put a plate in     HX THYROIDECTOMY      HX TONSILLECTOMY      HX TOTAL ABDOMINAL HYSTERECTOMY            ALLERGIES     Allergies   Allergen Reactions    Adhesive Rash    Ciprofloxacin-Dexamethasone Other (comments)     Headache    Codeine Nausea Only    Diflucan [Fluconazole] Other (comments)     Severe Headache    Fenofibrate Unknown (comments)     Cannot recall reaction    Statins-Hmg-Coa Reductase Inhibitors Myalgia    Sulfa (Sulfonamide Antibiotics) Rash          FAMILY HISTORY     Family History   Problem Relation Age of Onset    Asthma Father     Hypertension Father     Hypertension Mother     Stroke Mother     Cancer Sister         thyroid    negative for cardiac disease       SOCIAL HISTORY     Social History     Socioeconomic History    Marital status:    Tobacco Use    Smoking status: Former Smoker    Smokeless tobacco: Never Used    Tobacco comment: quit 27 yrs ago   Vaping Use    Vaping Use: Never used   Substance and Sexual Activity    Alcohol use: No    Drug use: No    Sexual activity: Yes         MEDICATIONS     Current Outpatient Medications   Medication Sig    quinapriL (ACCUPRIL) 20 mg tablet Take 20 mg by mouth two (2) times a day.     esomeprazole (NexIUM) 20 mg capsule Take by mouth two (2) times a day.  alirocumab (Praluent Pen) 75 mg/mL injector pen 75 mg by SubCUTAneous route Once every 2 weeks.  denosumab (Prolia) 60 mg/mL injection 60 mg by SubCUTAneous route every 6 months.  amLODIPine (NORVASC) 5 mg tablet TAKE 1 TABLET BY MOUTH EVERY DAY    ferrous sulfate 325 mg (65 mg iron) tablet Take 325 mg by mouth Daily (before breakfast).  magnesium oxide 500 mg tab Take 400 mg by mouth daily.  Saccharomyces boulardii (Florastor) 250 mg capsule Take 250 mg by mouth two (2) times a day.  umeclidinium (Incruse Ellipta) 62.5 mcg/actuation inhaler Take 1 Puff by inhalation daily.  cholecalciferol (Vitamin D3) (1000 Units /25 mcg) tablet Take 5,000 Units by mouth daily.  montelukast (SINGULAIR) 10 mg tablet Take 10 mg by mouth daily.  nateglinide (STARLIX) 120 mg tablet Take 120 mg by mouth Before breakfast, lunch, and dinner. Twice a day if no lunch    levothyroxine (Synthroid) 100 mcg tablet Take 100 mcg by mouth Daily (before breakfast).  liothyronine (CYTOMEL) 25 mcg tablet Take 12.5 mcg by mouth Daily (before breakfast).  ZETIA 10 mg tablet Take 10 mg by mouth daily.  nabumetone (RELAFEN) 500 mg tablet Take 1,000 mg by mouth two (2) times a day.  aspirin 81 mg chewable tablet Take 81 mg by mouth daily.  cyanocobalamin, vitamin B-12, 5,000 mcg cap Take 5,000 mcg by mouth daily.  levalbuterol tartrate (XOPENEX HFA) 45 mcg/actuation inhaler Take 2 Puffs by inhalation every four (4) hours as needed for Shortness of Breath. No current facility-administered medications for this visit. I have reviewed the nurses notes, vitals, problem list, allergy list, medical history, family, social history and medications. REVIEW OF SYMPTOMS   Pertinent positive per HPI   General: Pt denies excessive weight gain or loss.  Pt is able to conduct ADL's  HEENT: Denies blurred vision, headaches, hearing loss, epistaxis and difficulty swallowing. Respiratory: Denies cough, congestion, shortness of breath, YOUNGER, wheezing or stridor. Cardiovascular: Denies precordial pain, palpitations, edema or PND  Gastrointestinal: Denies poor appetite, indigestion, abdominal pain or blood in stool  Genitourinary: Denies hematuria, dysuria, increased urinary frequency  Musculoskeletal: Denies joint pain or swelling from muscles or joints  Neurologic: Denies tremor, paresthesias, headache, or sensory motor disturbance  Psychiatric: Denies confusion, insomnia, depression  Integumentray: Denies rash, itching or ulcers. Hematologic: Denies easy bruising, bleeding     PHYSICAL EXAMINATION      Vitals:    03/23/22 1020   BP: (!) 160/70   Pulse: 72   Weight: 162 lb 12.8 oz (73.8 kg)   Height: 5' 6\" (1.676 m)     General: Well developed, in no acute distress. HEENT: No jaundice, oral mucosa moist, no oral ulcers  Neck: Supple, no stiffness, no lymphadenopathy, supple  Heart:  Normal S1/S2 negative S3 or S4. Regular, no murmur, gallop or rub, no jugular venous distention  Respiratory: Clear bilaterally x 4, no wheezing or rales  Extremities:  No edema, normal cap refill, no cyanosis. Musculoskeletal: No clubbing, no deformities  Neuro: A&Ox3, speech clear, gait stable, cooperative, no focal neurologic deficits  Skin: Skin color is normal. No rashes or lesions. Non diaphoretic, moist.             DIAGNOSTIC DATA     1. Stress Test   5/28/21-Lexiscan-no ischemia, EF 72%     2. Echo   5/27/21-EF 60%, mild PI     3. CTA Chest   5/26/21-No acute process or evidence of pulmonary embolism. Emphysematous   Changes. 4. Lipids   2/10/21- , HDL 43, ,   1/19/22- , HDL 50, LDL 40,     5.  Calcium Score  7/22/21- LM 0  LAD 22.4  LCirc 0  RCA 39.4    Total 61.8         LABORATORY DATA            Lab Results   Component Value Date/Time    WBC 5.1 05/28/2021 06:52 AM    HGB 11.8 05/28/2021 06:52 AM    HCT 35.7 05/28/2021 06:52 AM    PLATELET 311 05/28/2021 06:52 AM    MCV 91.5 05/28/2021 06:52 AM      Lab Results   Component Value Date/Time    Sodium 140 05/28/2021 06:52 AM    Potassium 4.2 05/28/2021 06:52 AM    Chloride 111 (H) 05/28/2021 06:52 AM    CO2 26 05/28/2021 06:52 AM    Anion gap 3 (L) 05/28/2021 06:52 AM    Glucose 152 (H) 05/28/2021 06:52 AM    BUN 14 05/28/2021 06:52 AM    Creatinine 0.81 05/28/2021 06:52 AM    BUN/Creatinine ratio 17 05/28/2021 06:52 AM    GFR est AA >60 05/28/2021 06:52 AM    GFR est non-AA >60 05/28/2021 06:52 AM    Calcium 8.7 05/28/2021 06:52 AM    Bilirubin, total 0.4 05/28/2021 06:52 AM    Alk. phosphatase 62 05/28/2021 06:52 AM    Protein, total 5.9 (L) 05/28/2021 06:52 AM    Albumin 2.9 (L) 05/28/2021 06:52 AM    Globulin 3.0 05/28/2021 06:52 AM    A-G Ratio 1.0 (L) 05/28/2021 06:52 AM    ALT (SGPT) 23 05/28/2021 06:52 AM      ECG: 3/23/2022: Normal sinus rhythm     ASSESSMENT/RECOMMENDATIONS:.      1. Presyncope  -She did not wear an event loop monitor in the past but on the monitor she is using on her cell phone it does appear that she may be going in A. Fib  -I think to verify this time and put a event loop monitor on her  2. Dyslipidemia on Zetia   -LDL goal is under 100  -Calcium score was elevated in the 60s. -She is now on Praluent and her LDL is down to 40.  3. Hypertension  -Blood pressure is elevated and I will increase her amlodipine to 10 mg a day  -Recheck her blood pressure at the time of heart catheterization  4. Cardiac preoperative risk stratification for noncardiac surgery and will be a vascular surgery  -Concerning with her jaw pain that woke her from sleep and she said it was pretty significant this may be an anginal equivalent.  -Prior to undergoing her vascular surgery I think she will need a heart catheterization.   She is in agreement to this plan she understands that she is not having surgery until her cardiac catheterization was performed    Follow-up with me after her heart catheterization    Orders Placed This Encounter    AMB POC EKG ROUTINE W/ 12 LEADS, INTER & REP     Order Specific Question:   Reason for Exam:     Answer:   preop    quinapriL (ACCUPRIL) 20 mg tablet     Sig: Take 20 mg by mouth two (2) times a day.  esomeprazole (NexIUM) 20 mg capsule     Sig: Take  by mouth two (2) times a day.  alirocumab (Praluent Pen) 75 mg/mL injector pen     Si mg by SubCUTAneous route Once every 2 weeks.  denosumab (Prolia) 60 mg/mL injection     Si mg by SubCUTAneous route every 6 months. We discussed the expected course, resolution and complications of the diagnosis(es) in detail. Medication risks, benefits, costs, interactions, and alternatives were discussed as indicated. I advised him to contact the office if his condition worsens, changes or fails to improve as anticipated. He expressed understanding with the diagnosis(es) and plan          Follow-up and Dispositions  ·   Return in about 6 months (around 2022). I have discussed the diagnosis with  Alisha Prabhakar and the intended plan as seen in the above orders. Questions were answered concerning future plans. I have discussed medication side effects and warnings with the patient as well. Thank you,  Enrrique Santos MD for involving me in the care of  Alisha Prabhakar. Please do not hesitate to contact me for further questions/concerns. Mike Pride MD, [address] [place] [address] Rd[address]      [facility], [address] [place] [address] [facility]      [phone] / [phone] Fax

## 2022-03-23 ENCOUNTER — OFFICE VISIT (OUTPATIENT)
Dept: CARDIOLOGY CLINIC | Age: 77
End: 2022-03-23
Payer: MEDICARE

## 2022-03-23 ENCOUNTER — TELEPHONE (OUTPATIENT)
Dept: CARDIOLOGY CLINIC | Age: 77
End: 2022-03-23

## 2022-03-23 ENCOUNTER — DOCUMENTATION ONLY (OUTPATIENT)
Dept: CARDIOLOGY CLINIC | Age: 77
End: 2022-03-23

## 2022-03-23 VITALS
BODY MASS INDEX: 26.16 KG/M2 | HEART RATE: 72 BPM | DIASTOLIC BLOOD PRESSURE: 70 MMHG | WEIGHT: 162.8 LBS | HEIGHT: 66 IN | SYSTOLIC BLOOD PRESSURE: 160 MMHG

## 2022-03-23 DIAGNOSIS — R06.02 SOB (SHORTNESS OF BREATH): ICD-10-CM

## 2022-03-23 DIAGNOSIS — R42 DIZZINESS: Primary | ICD-10-CM

## 2022-03-23 DIAGNOSIS — R68.84 JAW PAIN: ICD-10-CM

## 2022-03-23 DIAGNOSIS — Z01.818 PREOP TESTING: Primary | ICD-10-CM

## 2022-03-23 DIAGNOSIS — I10 ESSENTIAL HYPERTENSION: ICD-10-CM

## 2022-03-23 PROCEDURE — G8419 CALC BMI OUT NRM PARAM NOF/U: HCPCS | Performed by: SPECIALIST

## 2022-03-23 PROCEDURE — 1101F PT FALLS ASSESS-DOCD LE1/YR: CPT | Performed by: SPECIALIST

## 2022-03-23 PROCEDURE — 93010 ELECTROCARDIOGRAM REPORT: CPT | Performed by: SPECIALIST

## 2022-03-23 PROCEDURE — G8753 SYS BP > OR = 140: HCPCS | Performed by: SPECIALIST

## 2022-03-23 PROCEDURE — G8432 DEP SCR NOT DOC, RNG: HCPCS | Performed by: SPECIALIST

## 2022-03-23 PROCEDURE — G8400 PT W/DXA NO RESULTS DOC: HCPCS | Performed by: SPECIALIST

## 2022-03-23 PROCEDURE — G8427 DOCREV CUR MEDS BY ELIG CLIN: HCPCS | Performed by: SPECIALIST

## 2022-03-23 PROCEDURE — 1090F PRES/ABSN URINE INCON ASSESS: CPT | Performed by: SPECIALIST

## 2022-03-23 PROCEDURE — G8536 NO DOC ELDER MAL SCRN: HCPCS | Performed by: SPECIALIST

## 2022-03-23 PROCEDURE — G0463 HOSPITAL OUTPT CLINIC VISIT: HCPCS | Performed by: SPECIALIST

## 2022-03-23 PROCEDURE — G8754 DIAS BP LESS 90: HCPCS | Performed by: SPECIALIST

## 2022-03-23 PROCEDURE — 93005 ELECTROCARDIOGRAM TRACING: CPT | Performed by: SPECIALIST

## 2022-03-23 PROCEDURE — 99214 OFFICE O/P EST MOD 30 MIN: CPT | Performed by: SPECIALIST

## 2022-03-23 RX ORDER — AMLODIPINE BESYLATE 10 MG/1
5 TABLET ORAL DAILY
Qty: 30 TABLET | Refills: 5 | Status: SHIPPED | OUTPATIENT
Start: 2022-03-23 | End: 2022-05-13 | Stop reason: DRUGHIGH

## 2022-03-23 RX ORDER — QUINAPRIL 20 MG/1
20 TABLET ORAL 2 TIMES DAILY
COMMUNITY

## 2022-03-23 RX ORDER — HYDROGEN PEROXIDE 3 %
SOLUTION, NON-ORAL MISCELLANEOUS 2 TIMES DAILY
COMMUNITY

## 2022-03-23 RX ORDER — ALIROCUMAB 75 MG/ML
75 INJECTION, SOLUTION SUBCUTANEOUS EVERY 2 WEEKS
COMMUNITY

## 2022-03-23 RX ORDER — DENOSUMAB 60 MG/ML
60 INJECTION SUBCUTANEOUS
COMMUNITY

## 2022-03-23 NOTE — LETTER
3/23/2022    Patient: Sonia Encarnacion   YOB: 1945   Date of Visit: 3/23/2022     Ivy Rod MD  [address] [address]  Via Fax: [phone]    Dear Ivy oRd MD,      Thank you for referring Ms. Ayaka Alvarez to [facility] for evaluation. My notes for this consultation are attached. If you have questions, please do not hesitate to call me. I look forward to following your patient along with you.       Sincerely,    Jean Pierre Chase MD

## 2022-03-23 NOTE — PROGRESS NOTES
Patient called and stated that he has not heard back from us regarding an appt with dr weight and another test called dicher test  Kisha is aware. Ashley Knutson, MARY ELLENN Staff Nurse     Visit Vitals  BP (!) 160/70   Pulse 72   Ht 5' 6\" (1.676 m)   Wt 162 lb 12.8 oz (73.8 kg)   BMI 26.28 kg/m²

## 2022-03-23 NOTE — TELEPHONE ENCOUNTER
Spoke with Pt of [facility] schd. For 3/29/22 at 1:00pm  Arrive at 12:00pm  Pt aware that they need a  NPO from [facility] [address] the night before. Check in at the second floor Reg. Desk. Pt is to have Labs done on 3/23/22. Pt is to hold these's Meds. Ok to take  VO by [employer].

## 2022-03-24 ENCOUNTER — TELEPHONE (OUTPATIENT)
Dept: CARDIOLOGY CLINIC | Age: 77
End: 2022-03-24

## 2022-03-24 LAB
ANION GAP SERPL CALC-SCNC: 6 MMOL/L (ref 5–15)
BUN SERPL-MCNC: 28 MG/DL (ref 6–20)
BUN/CREAT SERPL: 22 (ref 12–20)
CALCIUM SERPL-MCNC: 9.6 MG/DL (ref 8.5–10.1)
CHLORIDE SERPL-SCNC: 102 MMOL/L (ref 97–108)
CO2 SERPL-SCNC: 26 MMOL/L (ref 21–32)
CREAT SERPL-MCNC: 1.28 MG/DL (ref 0.55–1.02)
ERYTHROCYTE [DISTWIDTH] IN BLOOD BY AUTOMATED COUNT: 12.6 % (ref 11.5–14.5)
GLUCOSE SERPL-MCNC: 258 MG/DL (ref 65–100)
HCT VFR BLD AUTO: 39.7 % (ref 35–47)
HGB BLD-MCNC: 12.8 G/DL (ref 11.5–16)
MCH RBC QN AUTO: 30.3 PG (ref 26–34)
MCHC RBC AUTO-ENTMCNC: 32.2 G/DL (ref 30–36.5)
MCV RBC AUTO: 93.9 FL (ref 80–99)
NRBC # BLD: 0.02 K/UL (ref 0–0.01)
NRBC BLD-RTO: 0.2 PER 100 WBC
PLATELET # BLD AUTO: 337 K/UL (ref 150–400)
PMV BLD AUTO: 11 FL (ref 8.9–12.9)
POTASSIUM SERPL-SCNC: 5.5 MMOL/L (ref 3.5–5.1)
RBC # BLD AUTO: 4.23 M/UL (ref 3.8–5.2)
SODIUM SERPL-SCNC: 134 MMOL/L (ref 136–145)
WBC # BLD AUTO: 11.6 K/UL (ref 3.6–11)

## 2022-03-24 NOTE — PROGRESS NOTES
Your CBC is normal but your white count is slightly elevated. Sometimes an elevated white count means an underlying infection. I would just make sure you are not having any fevers. Your kidney function is down slightly and your potassium is up so I would like for you to increase your fluid intake and reduce any foods that are high in potassium including bananas. We have like to have your labs checked after your heart catheterization about 10 days later. Hope you are well.

## 2022-03-24 NOTE — TELEPHONE ENCOUNTER
Calling to request cardiac clearance faxed to [phone]        Dr. Rodríguez Factor        [phone]

## 2022-03-28 RX ORDER — SODIUM CHLORIDE 0.9 % (FLUSH) 0.9 %
5-40 SYRINGE (ML) INJECTION EVERY 8 HOURS
Status: CANCELLED | OUTPATIENT
Start: 2022-03-29

## 2022-03-28 RX ORDER — SODIUM CHLORIDE 0.9 % (FLUSH) 0.9 %
5-40 SYRINGE (ML) INJECTION AS NEEDED
Status: CANCELLED | OUTPATIENT
Start: 2022-03-29

## 2022-03-28 RX ORDER — SODIUM CHLORIDE 9 MG/ML
75 INJECTION, SOLUTION INTRAVENOUS CONTINUOUS
Status: CANCELLED | OUTPATIENT
Start: 2022-03-29

## 2022-03-28 NOTE — TELEPHONE ENCOUNTER
Spoke To Pt:  Just a reminder not to forget your LHC scheduled for tomorrow. You will need a    NPO from midnight   check in at the 2nd floor outpt. reg. Desk.   Hold these's Medications:  Ok to take  VO by //nurse Yamil Ibrahim

## 2022-03-29 ENCOUNTER — HOSPITAL ENCOUNTER (OUTPATIENT)
Age: 77
Setting detail: OUTPATIENT SURGERY
Discharge: HOME | End: 2022-03-29
Attending: INTERNAL MEDICINE | Admitting: INTERNAL MEDICINE
Payer: MEDICARE

## 2022-03-29 ENCOUNTER — PATIENT MESSAGE (OUTPATIENT)
Dept: CARDIOLOGY CLINIC | Age: 77
End: 2022-03-29

## 2022-03-29 VITALS
HEART RATE: 59 BPM | DIASTOLIC BLOOD PRESSURE: 43 MMHG | TEMPERATURE: 98.7 F | RESPIRATION RATE: 17 BRPM | OXYGEN SATURATION: 98 % | SYSTOLIC BLOOD PRESSURE: 125 MMHG

## 2022-03-29 DIAGNOSIS — Z01.818 PRE-OPERATIVE CLEARANCE: ICD-10-CM

## 2022-03-29 DIAGNOSIS — I10 ESSENTIAL HYPERTENSION: Primary | ICD-10-CM

## 2022-03-29 LAB
ACT BLD: 214 SECS (ref 79–138)
GLUCOSE BLD STRIP.AUTO-MCNC: 192 MG/DL (ref 65–117)
SERVICE CMNT-IMP: ABNORMAL

## 2022-03-29 PROCEDURE — 77030008543 HC TBNG MON PRSS MRTM -A: Performed by: INTERNAL MEDICINE

## 2022-03-29 PROCEDURE — 74011000250 HC RX REV CODE- 250: Performed by: INTERNAL MEDICINE

## 2022-03-29 PROCEDURE — 82962 GLUCOSE BLOOD TEST: CPT

## 2022-03-29 PROCEDURE — 99152 MOD SED SAME PHYS/QHP 5/>YRS: CPT | Performed by: INTERNAL MEDICINE

## 2022-03-29 PROCEDURE — 74011250636 HC RX REV CODE- 250/636: Performed by: INTERNAL MEDICINE

## 2022-03-29 PROCEDURE — 77030019569 HC BND COMPR RAD TERU -B: Performed by: INTERNAL MEDICINE

## 2022-03-29 PROCEDURE — 93571 IV DOP VEL&/PRESS C FLO 1ST: CPT | Performed by: INTERNAL MEDICINE

## 2022-03-29 PROCEDURE — C1769 GUIDE WIRE: HCPCS | Performed by: INTERNAL MEDICINE

## 2022-03-29 PROCEDURE — 93458 L HRT ARTERY/VENTRICLE ANGIO: CPT | Performed by: INTERNAL MEDICINE

## 2022-03-29 PROCEDURE — 99153 MOD SED SAME PHYS/QHP EA: CPT | Performed by: INTERNAL MEDICINE

## 2022-03-29 PROCEDURE — C1894 INTRO/SHEATH, NON-LASER: HCPCS | Performed by: INTERNAL MEDICINE

## 2022-03-29 PROCEDURE — 2709999900 HC NON-CHARGEABLE SUPPLY: Performed by: INTERNAL MEDICINE

## 2022-03-29 PROCEDURE — C1887 CATHETER, GUIDING: HCPCS | Performed by: INTERNAL MEDICINE

## 2022-03-29 PROCEDURE — 85347 COAGULATION TIME ACTIVATED: CPT

## 2022-03-29 PROCEDURE — 74011000636 HC RX REV CODE- 636: Performed by: INTERNAL MEDICINE

## 2022-03-29 RX ORDER — HEPARIN SODIUM 1000 [USP'U]/ML
INJECTION, SOLUTION INTRAVENOUS; SUBCUTANEOUS AS NEEDED
Status: DISCONTINUED | OUTPATIENT
Start: 2022-03-29 | End: 2022-03-29 | Stop reason: HOSPADM

## 2022-03-29 RX ORDER — MIDAZOLAM HYDROCHLORIDE 1 MG/ML
INJECTION, SOLUTION INTRAMUSCULAR; INTRAVENOUS AS NEEDED
Status: DISCONTINUED | OUTPATIENT
Start: 2022-03-29 | End: 2022-03-29 | Stop reason: HOSPADM

## 2022-03-29 RX ORDER — FENTANYL CITRATE 50 UG/ML
INJECTION, SOLUTION INTRAMUSCULAR; INTRAVENOUS AS NEEDED
Status: DISCONTINUED | OUTPATIENT
Start: 2022-03-29 | End: 2022-03-29 | Stop reason: HOSPADM

## 2022-03-29 RX ORDER — SODIUM CHLORIDE 0.9 % (FLUSH) 0.9 %
5-40 SYRINGE (ML) INJECTION AS NEEDED
Status: DISCONTINUED | OUTPATIENT
Start: 2022-03-29 | End: 2022-03-29 | Stop reason: HOSPADM

## 2022-03-29 RX ORDER — SODIUM CHLORIDE 0.9 % (FLUSH) 0.9 %
5-40 SYRINGE (ML) INJECTION EVERY 8 HOURS
Status: DISCONTINUED | OUTPATIENT
Start: 2022-03-29 | End: 2022-03-29 | Stop reason: HOSPADM

## 2022-03-29 RX ORDER — SODIUM CHLORIDE 9 MG/ML
75 INJECTION, SOLUTION INTRAVENOUS CONTINUOUS
Status: DISCONTINUED | OUTPATIENT
Start: 2022-03-29 | End: 2022-03-29 | Stop reason: HOSPADM

## 2022-03-29 RX ORDER — LIDOCAINE HYDROCHLORIDE 10 MG/ML
INJECTION INFILTRATION; PERINEURAL AS NEEDED
Status: DISCONTINUED | OUTPATIENT
Start: 2022-03-29 | End: 2022-03-29 | Stop reason: HOSPADM

## 2022-03-29 RX ORDER — VERAPAMIL HYDROCHLORIDE 2.5 MG/ML
INJECTION, SOLUTION INTRAVENOUS AS NEEDED
Status: DISCONTINUED | OUTPATIENT
Start: 2022-03-29 | End: 2022-03-29 | Stop reason: HOSPADM

## 2022-03-29 RX ORDER — HEPARIN SODIUM 200 [USP'U]/100ML
INJECTION, SOLUTION INTRAVENOUS
Status: COMPLETED | OUTPATIENT
Start: 2022-03-29 | End: 2022-03-29

## 2022-03-29 RX ORDER — ACETAMINOPHEN 325 MG/1
650 TABLET ORAL
Status: DISCONTINUED | OUTPATIENT
Start: 2022-03-29 | End: 2022-03-29 | Stop reason: HOSPADM

## 2022-03-29 NOTE — Clinical Note
TRANSFER - IN REPORT:     Verbal report received from: Laura. Report consisted of patient's Situation, Background, Assessment and   Recommendations(SBAR). Opportunity for questions and clarification was provided. Assessment completed upon patient's arrival to unit and care assumed. Patient transported with a Registered Nurse and [address] [place] [address] / [facility] [employer].

## 2022-03-29 NOTE — PROCEDURES
BRIEF PROCEDURE NOTE    Date of Procedure: 3/29/2022   Preoperative Diagnosis: Chest Pain  Postoperative Diagnosis: Non obstructive CAD ; RCA - intermediate lesion - iFR neg 0.98  Procedure: Left heart cath, LV angiography, coronary angiography  Interventional Cardiologist: Arlene Multani MD  Assistant : none  Anesthesia: local + IV sedation  Estimated Blood Loss: Minimal  Findings:     R Radial - 6 F sheath    Tortuous R SCV - Versicor wire used to advance catheter into asc aorta    L Main: Med MLI    LAD:  Med; Mid 30%; D1 - MLI    LCflex: Med; MLI; OM1 - MLI    RCA: Dominant; Med; mid 50% PDA and PLB - MLI    LVEDP: 8 mm Hg    LVEF: Not assessed    No significant gradient across aortic valve. R Radial sheath exchanged for another sheath secondary to leaky hemostatic valve    JR 4 guide; iFR mid RCA - not significant 0.98    PCI:none    Specimens Removed : None    Devices implanted : None    Complications: None    Closure Device: R Radial - TR band        See full cath note.     Complications: none    Arlene Multani MD     D/w Daughter Estelle Goodell by Sherrell Pappas

## 2022-03-29 NOTE — Clinical Note
Sheath exchanged over the kit wire due to leaking valve. New 6Fr Glidesheath placed. Wire removed following sheath exchange.

## 2022-03-29 NOTE — ROUTINE PROCESS
12:27 PM  Patient arrived. ID and allergies verified verbally with patient. Pt voices understanding of procedure to be performed. Consent obtained. Pt prepped for procedure. 12:59 PM  TRANSFER - OUT REPORT:    Verbal report given to Erika Greene RN(name) on Joni Lopes  being transferred to cath lab(unit) for ordered procedure       Report consisted of patients Situation, Background, Assessment and   Recommendations(SBAR). Information from the following report(s) SBAR was reviewed with the receiving nurse. Lines:   Peripheral IV 03/29/22 Anterior; Left Forearm (Active)   Site Assessment Clean, dry, & intact 03/29/22 1254   Phlebitis Assessment 0 03/29/22 1254   Dressing Status Clean, dry, & intact 03/29/22 1254   Dressing Type Transparent 03/29/22 1254   Hub Color/Line Status Blue 03/29/22 1254        Opportunity for questions and clarification was provided. Patient transported with:   Registered Nurse    2:18 PM  TRANSFER - IN REPORT:    Verbal report received from Erika Greene RN(name) on Joni Lopes  being received from cath lab(unit) for routine post - op      Report consisted of patients Situation, Background, Assessment and   Recommendations(SBAR). Information from the following report(s) Procedure Summary was reviewed with the receiving nurse. Opportunity for questions and clarification was provided. Assessment completed upon patients arrival to unit and care assumed. TR band in place, inflated with 14 mL of air. 3:16 PM  2 ml air released from TR Band. No bleeding or hematoma noted. Radial and Ulnar pulse on R wrist palpable. Pt tolerated well. Will continue to monitor. 12 mL remaining.    3:21 PM  3 ml air released from TR Band. No bleeding or hematoma noted. Radial and Ulnar pulse on R wrist palpable. Pt tolerated well. Will continue to monitor. 9 mL remaining.    3:26 PM  3 ml air released from TR Band. No bleeding or hematoma noted.  Radial and Ulnar pulse on R wrist palpable. Pt tolerated well. Will continue to monitor. 6 mL remaining.    3:31 PM  3 ml air released from TR Band. No bleeding or hematoma noted. Radial and Ulnar pulse on R wrist palpable. Pt tolerated well. Will continue to monitor. 3 mL remaining. 3:36 PM  3 ml air released from TR Band. No bleeding or hematoma noted. Radial and Ulnar pulse on R wrist palpable. Pt tolerated well. Will continue to monitor. Air release completed. TR Band removed from r wrist. No bleeding or  Hematoma. Dressing applied. Wrist immobilizer in place. Radial and ulnar pulse remain palpable on affected extremity. Pt tolerated well. Instructions given to pt regarding movement and activity restrictions. Pt voiced understanding. 4:30 PM  Discharge instructions reviewed with patient and family. Voiced understanding. Patient given copy of discharge instructions to take home. 4:45 PM  Pt discharged via wheelchair with sister. Personal belongings with patient upon discharge.

## 2022-03-29 NOTE — CARDIO/PULMONARY
[facility] Cardiopulmonary Rehab    OPCR chart review    75 yo female from [facility] presents for OP cath prior to surgery. PMH includes HTN, HLD and DM2. Cath today revealed 30% mid LAD, 50% mid RCA with iFR 0.98 - not significant. No significant gradient across aortic valve.     Currently not a candidate for cardiac rehab

## 2022-03-29 NOTE — Clinical Note
TRANSFER - OUT REPORT:     Verbal report given to: [facility]. Report consisted of patient's Situation, Background, Assessment and   Recommendations(SBAR). Opportunity for questions and clarification was provided. Patient transported with a Registered Nurse and [address] [place] [address] / Patient Care Tech. Patient transported to: [facility].

## 2022-03-29 NOTE — DISCHARGE INSTRUCTIONS
[address] AND INTERVENTIONAL DISCHARGE INSTRUCTIONS       MEDICATIONS:  Take only medications ordered by your provider. ACTIVITIES:  While the wound is healing; bleeding or swelling can occur as a result of stress or strain. Carefully follow these guidelines:    · DO NOT DRIVE for 24 hours after the procedure or as instructed by your provider. · You may shower 24 hours after your procedure. No soaking the wrist for 7 days (i.e., bath tub, swimming, washing dishes). · You may return to work in 3 days. · It is essential that you DO NOT SMOKE. · Do not bend your wrist for 24 hours. · Do not lift more than 3-5 pounds with affected wrist for 1 week. SITE CARE:  · Keep site clean and dry. · Avoid lotions, ointments or powders at the wound site for 1 week. · Check the procedural site for any signs of infection (redness, drainage, swelling). · You may notice a small, hard lump or small bruise at the puncture site. This is normal and will clear in about 2 weeks. · If the lump increases in size; apply firm, direct pressure over the site. Notify your physician. · If there is a small amount of bleeding at the site; apply firm, direct continuous pressure over the site against the puncture site for 10 minutes. Your nurse will review this with you. Notify your physician. · If bleeding does not stop after 10 minutes or if there is a large amount of bleeding, call for an ambulance immediately. Continue to hold pressure until help arrives. WHEN TO CALL YOUR DOCTOR:  · Angina - if your angina symptoms return; use your nitroglycerin as instructed by your provider. If you do not have nitroglycerin or if your symptoms do not completely resolve after 10 -15 minutes, call an ambulance.  Do not drive yourself to the hospital.  · Warmth, redness, drainage and/or pain at the procedural site or temperature over 101°F.  · Persistent tenderness or pain at procedural site. · Swelling, coolness, numbness and /or tingling of the fingers, hand, wrist or arm. · Lightheadedness, dizziness, fainting or rapid heart beating. · If you have any other questions or concerns, or you are experiencing a problem. FOLLOW-UP CARE:  · Follow up with your provider and /or cardiologist as recommended.   · Never stop taking your prescribed Brilinta (Ticagrelor),Plavix (Clopidogrel), Aspirin, Coumadin (Warfarin) or Effient (Prasugrel) without speaking with your cardiologist.

## 2022-03-31 ENCOUNTER — TELEPHONE (OUTPATIENT)
Dept: CARDIOLOGY CLINIC | Age: 77
End: 2022-03-31

## 2022-03-31 NOTE — TELEPHONE ENCOUNTER
Dr Martin Aquino needs clearance for pt - having a Fem-by-pop. Cath done 3/29/22- no stents.  Takes ASA 81 mg

## 2022-04-13 NOTE — TELEPHONE ENCOUNTER
Patient is scheduled for surgery 04/20/2022Requesting results from the most recent tests done     Is the patient wearing a loop recorder at present.     RIS:[phone]

## 2022-05-13 ENCOUNTER — TELEPHONE (OUTPATIENT)
Dept: CARDIOLOGY CLINIC | Age: 77
End: 2022-05-13

## 2022-05-13 RX ORDER — AMLODIPINE BESYLATE 10 MG/1
TABLET ORAL DAILY
COMMUNITY
End: 2022-05-13 | Stop reason: SDUPTHER

## 2022-05-13 RX ORDER — AMLODIPINE BESYLATE 10 MG/1
10 TABLET ORAL DAILY
Qty: 90 TABLET | Refills: 3 | Status: SHIPPED | OUTPATIENT
Start: 2022-05-13

## 2022-05-13 NOTE — TELEPHONE ENCOUNTER
Please call the patient to verify the correct dosage for her amlodipine, please advise        [phone]

## 2022-06-13 LAB — HBA1C MFR BLD HPLC: 7.2 %

## 2022-07-15 NOTE — PROGRESS NOTES
Reason for Admission:  Near syncope and collapse  PTA pt stated she was independent with ADL's, was driving and walked unaided. Medications [facility]. RUR Score:     12%                Plan for utilizing home health: To be determined     PCP: First and Last name:  Bernardo Dimas MD     Name of Practice:    Are you a current patient: Yes/No: yes   Approximate date of last visit: 2 weeks ago   Can you participate in a virtual visit with your PCP: yes                  Current Advanced Directive/Advance Care Plan: Full Code   was called per pt's request for Advance Directive. Healthcare Decision Maker:   Click here to complete [address] including selection of the Healthcare Decision Maker Relationship (ie \"Primary\")                             Transition of Care Plan:     1. Pt's dtr to transport her home at d/c  2. Cardiology  Consult; ECHO pending  3. PT/OT consult  4. CM following for any needs prior to d/c    Care Management Interventions  PCP Verified by CM: Yes  Mode of Transport at Discharge: Other (see comment) (daughter)  Transition of Care Consult (CM Consult): Discharge Planning  Physical Therapy Consult: Yes  Occupational Therapy Consult: Yes  Current Support Network: Own Home, Lives with Spouse (1 story house with 4-5 entry steps)  Confirm Follow Up Transport: Family  Discharge Location  Discharge Placement: Home with family assistance  [facility] Offer X Size Of Lesion In Cm: 0

## 2022-07-19 ENCOUNTER — OFFICE VISIT (OUTPATIENT)
Dept: CARDIOLOGY CLINIC | Age: 77
End: 2022-07-19
Payer: MEDICARE

## 2022-07-19 VITALS
HEIGHT: 66 IN | WEIGHT: 161.3 LBS | SYSTOLIC BLOOD PRESSURE: 120 MMHG | OXYGEN SATURATION: 98 % | BODY MASS INDEX: 25.92 KG/M2 | DIASTOLIC BLOOD PRESSURE: 60 MMHG | HEART RATE: 70 BPM

## 2022-07-19 DIAGNOSIS — R42 DIZZINESS: ICD-10-CM

## 2022-07-19 DIAGNOSIS — I10 ESSENTIAL HYPERTENSION: Primary | ICD-10-CM

## 2022-07-19 DIAGNOSIS — R00.2 PALPITATION: ICD-10-CM

## 2022-07-19 DIAGNOSIS — R06.02 SOB (SHORTNESS OF BREATH): ICD-10-CM

## 2022-07-19 PROCEDURE — G8510 SCR DEP NEG, NO PLAN REQD: HCPCS | Performed by: SPECIALIST

## 2022-07-19 PROCEDURE — 99214 OFFICE O/P EST MOD 30 MIN: CPT | Performed by: SPECIALIST

## 2022-07-19 PROCEDURE — G8754 DIAS BP LESS 90: HCPCS | Performed by: SPECIALIST

## 2022-07-19 PROCEDURE — G8752 SYS BP LESS 140: HCPCS | Performed by: SPECIALIST

## 2022-07-19 PROCEDURE — G8400 PT W/DXA NO RESULTS DOC: HCPCS | Performed by: SPECIALIST

## 2022-07-19 PROCEDURE — G8536 NO DOC ELDER MAL SCRN: HCPCS | Performed by: SPECIALIST

## 2022-07-19 PROCEDURE — 1123F ACP DISCUSS/DSCN MKR DOCD: CPT | Performed by: SPECIALIST

## 2022-07-19 PROCEDURE — G0463 HOSPITAL OUTPT CLINIC VISIT: HCPCS | Performed by: SPECIALIST

## 2022-07-19 PROCEDURE — G8427 DOCREV CUR MEDS BY ELIG CLIN: HCPCS | Performed by: SPECIALIST

## 2022-07-19 PROCEDURE — G8417 CALC BMI ABV UP PARAM F/U: HCPCS | Performed by: SPECIALIST

## 2022-07-19 PROCEDURE — 1101F PT FALLS ASSESS-DOCD LE1/YR: CPT | Performed by: SPECIALIST

## 2022-07-19 PROCEDURE — 1090F PRES/ABSN URINE INCON ASSESS: CPT | Performed by: SPECIALIST

## 2022-07-19 RX ORDER — RIVAROXABAN 2.5 MG/1
2.5 TABLET, FILM COATED ORAL 2 TIMES DAILY
COMMUNITY
Start: 2022-06-16

## 2022-07-19 NOTE — PROGRESS NOTES
CARDIOLOGY OFFICE NOTE    Mike Marie MD, [address], [place], [address] [facility]  Phone [phone]; Fax [phone]  Mobile [phone]   Voice Mail [phone]    LAST OFFICE VISIT : 6/2/2021  Nicholas Bartlett MD       ATTENTION:   This medical record was transcribed using an electronic medical records/speech recognition system. Although proofread, it may and can contain electronic, spelling and other errors. Corrections may be executed at a later time. Please feel free to contact us for any clarifications as needed. ICD-10-CM ICD-9-CM   1. Essential hypertension  I10 401.9   2. Palpitation  R00.2 785.1   3. SOB (shortness of breath)  R06.02 786.05   4. Dizziness  R42 780.4            Nicole Tapia is a 76 y.o. female with  referred for cardiac preoperative risk stratification for noncardiac surgery. Cardiac risk factors: smoking/ tobacco exposure, family history, dyslipidemia, diabetes mellitus, hypertension, post-menopausal.  I have personally obtained the history from the patient. HISTORY OF PRESENTING ILLNESS      Nicole Tapia is a 76 y.o. female who has a history of diabetes type 2 hypertension and asthma. She has been doing well with no chest pain or shortness of breath. She did have a heart catheterization that demonstrated insignificant CAD with a 30% LAD lesion and a 50% RCA lesion. She did undergo vascular surgery on her left leg and is slightly swollen she has some difficulty raising it now. She is not exercising regularly. Left femoropopliteal bypass is what sounds like. I do not have any documentation of that from [facility].  I did review with her that her TSH was 0.11 as she may discuss this with her primary care. Also asked that she call the vascular surgeon and see if she needed any rehab because her leg is tight and difficult to lift now. Her edema is significantly better however.          ACTIVE PROBLEM LIST Patient Active Problem List    Diagnosis Date Noted    Syncope and collapse 2021    Chest pain at rest 2021    Hypertension 2021    DM type 2 (diabetes mellitus, type 2) ([facility] 2021           PAST MEDICAL HISTORY     Past Medical History:   Diagnosis Date    Adverse effect of anesthesia     difficulty waking with anesthesia     Arthritis     Asthma     Diabetes mellitus, type 2 ([facility]     Emphysema (subcutaneous) (surgical) resulting from a procedure     GERD (gastroesophageal reflux disease)     Hyperlipidemia     Hypertension     Osteoarthritis     Osteoporosis     Thyroid cancer ([facility]            PAST SURGICAL HISTORY     Past Surgical History:   Procedure Laterality Date    HX APPENDECTOMY      HX  SECTION      times two    HX CYST REMOVAL      right hand removed    HX DILATION AND CURETTAGE      HX ORTHOPAEDIC      broken wrist left put a plate in     HX THYROIDECTOMY      HX TONSILLECTOMY      HX TOTAL ABDOMINAL HYSTERECTOMY            ALLERGIES     Allergies   Allergen Reactions    Adhesive Rash    Ciprofloxacin-Dexamethasone Other (comments)     Headache    Codeine Nausea Only    Diflucan [Fluconazole] Other (comments)     Severe Headache    Fenofibrate Unknown (comments)     Cannot recall reaction    Statins-Hmg-Coa Reductase Inhibitors Myalgia    Sulfa (Sulfonamide Antibiotics) Rash          FAMILY HISTORY     Family History   Problem Relation Age of Onset    Asthma Father     Hypertension Father     Hypertension Mother     Stroke Mother     Cancer Sister         thyroid    negative for cardiac disease       SOCIAL HISTORY     Social History     Socioeconomic History    Marital status:    Tobacco Use    Smoking status: Former Smoker    Smokeless tobacco: Never Used    Tobacco comment: quit 27 yrs ago   Vaping Use    Vaping Use: Never used   Substance and Sexual Activity    Alcohol use: No    Drug use: No    Sexual activity: Yes         MEDICATIONS     Current Outpatient Medications   Medication Sig    Xarelto 2.5 mg tablet Take 2.5 mg by mouth two (2) times a day.  OTHER,NON-FORMULARY, Take 300 mg by mouth two (2) times a day. Canabrex    amLODIPine (NORVASC) 10 mg tablet Take 1 Tablet by mouth daily.  quinapriL (ACCUPRIL) 20 mg tablet Take 20 mg by mouth two (2) times a day.  esomeprazole (NexIUM) 20 mg capsule Take  by mouth two (2) times a day.  alirocumab (Praluent Pen) 75 mg/mL injector pen 75 mg by SubCUTAneous route Once every 2 weeks.  denosumab (Prolia) 60 mg/mL injection 60 mg by SubCUTAneous route every 6 months.  ferrous sulfate 325 mg (65 mg iron) tablet Take 325 mg by mouth Daily (before breakfast).  magnesium oxide 500 mg tab Take 400 mg by mouth daily.  Saccharomyces boulardii (Florastor) 250 mg capsule Take 250 mg by mouth two (2) times a day.  umeclidinium (Incruse Ellipta) 62.5 mcg/actuation inhaler Take 1 Puff by inhalation daily.  cholecalciferol (Vitamin D3) (1000 Units /25 mcg) tablet Take 5,000 Units by mouth daily.  montelukast (SINGULAIR) 10 mg tablet Take 10 mg by mouth daily.  nateglinide (STARLIX) 120 mg tablet Take 120 mg by mouth Before breakfast, lunch, and dinner. Twice a day if no lunch    levothyroxine (Synthroid) 100 mcg tablet Take 100 mcg by mouth Daily (before breakfast).  liothyronine (CYTOMEL) 25 mcg tablet Take 12.5 mcg by mouth Daily (before breakfast).  ZETIA 10 mg tablet Take 10 mg by mouth daily.  aspirin 81 mg chewable tablet Take 81 mg by mouth daily.  cyanocobalamin, vitamin B-12, 5,000 mcg cap Take 5,000 mcg by mouth daily.  levalbuterol tartrate (XOPENEX HFA) 45 mcg/actuation inhaler Take 2 Puffs by inhalation every four (4) hours as needed for Shortness of Breath.  nabumetone (RELAFEN) 500 mg tablet Take 1,000 mg by mouth two (2) times a day.  (Patient not taking: Reported on 7/19/2022)     No current facility-administered medications for this visit. I have reviewed the nurses notes, vitals, problem list, allergy list, medical history, family, social history and medications. REVIEW OF SYMPTOMS   Pertinent positive per HPI   General: Pt denies excessive weight gain or loss. Pt is able to conduct ADL's  HEENT: Denies blurred vision, headaches, hearing loss, epistaxis and difficulty swallowing. Respiratory: Denies cough, congestion, shortness of breath, YOUNGER, wheezing or stridor. Cardiovascular: Denies precordial pain, palpitations, edema or PND  Gastrointestinal: Denies poor appetite, indigestion, abdominal pain or blood in stool  Genitourinary: Denies hematuria, dysuria, increased urinary frequency  Musculoskeletal: Denies joint pain or swelling from muscles or joints  Neurologic: Denies tremor, paresthesias, headache, or sensory motor disturbance  Psychiatric: Denies confusion, insomnia, depression  Integumentray: Denies rash, itching or ulcers. Hematologic: Denies easy bruising, bleeding     PHYSICAL EXAMINATION      Vitals:    07/19/22 1045   BP: 120/60   Pulse: 70   SpO2: 98%   Weight: 161 lb 4.8 oz (73.2 kg)   Height: 5' 6\" (1.676 m)     General: Well developed, in no acute distress. HEENT: No jaundice, oral mucosa moist, no oral ulcers  Neck: Supple, no stiffness, no lymphadenopathy, supple  Heart:  Normal S1/S2 negative S3 or S4. Regular, no murmur, gallop or rub, no jugular venous distention  Respiratory: Clear bilaterally x 4, no wheezing or rales  Extremities: Left leg edema with  compression hose on. Musculoskeletal: No clubbing, no deformities  Neuro: A&Ox3, speech clear, gait stable, cooperative, no focal neurologic deficits  Skin: Skin color is normal. No rashes or lesions. Non diaphoretic, moist.             DIAGNOSTIC DATA     1. Stress Test   5/28/21-Lexiscan-no ischemia, EF 72%     2. Echo   5/27/21-EF 60%, mild PI     3. CTA Chest   5/26/21-No acute process or evidence of pulmonary embolism. Emphysematous   Changes. 4. Lipids   2/10/21- , HDL 43, ,   1/19/22- , HDL 50, LDL 40,   6/14/22- , HDL 46, LDL 68,     5. Calcium Score  7/22/21- LM 0  LAD 22.4  LCirc 0  RCA 39.4    Total 61.8    6. Cardiac Cath  3/29/22-Left Main  The vessel was visualized by angiography. The vessel is angiographically normal.  Left Anterior Descending  Mid LAD lesion, 30% stenosed. Left Circumflex  The vessel exhibits minimal luminal irregularities. Right Coronary Artery  Mid RCA lesion, 50% stenosed. Pressure wire/iFR was perfromed on the lesion. Flow Reserve: 0.98.    7. Loop  3/31/22-4/19/22- SR, average HR 69, max 111, min 51         LABORATORY DATA            Lab Results   Component Value Date/Time    WBC 11.6 (H) 03/23/2022 11:52 AM    HGB 12.8 03/23/2022 11:52 AM    HCT 39.7 03/23/2022 11:52 AM    PLATELET 478 23/85/2683 11:52 AM    MCV 93.9 03/23/2022 11:52 AM      Lab Results   Component Value Date/Time    Sodium 134 (L) 03/23/2022 11:52 AM    Potassium 5.5 (H) 03/23/2022 11:52 AM    Chloride 102 03/23/2022 11:52 AM    CO2 26 03/23/2022 11:52 AM    Anion gap 6 03/23/2022 11:52 AM    Glucose 258 (H) 03/23/2022 11:52 AM    BUN 28 (H) 03/23/2022 11:52 AM    Creatinine 1.28 (H) 03/23/2022 11:52 AM    BUN/Creatinine ratio 22 (H) 03/23/2022 11:52 AM    GFR est AA 49 (L) 03/23/2022 11:52 AM    GFR est non-AA 40 (L) 03/23/2022 11:52 AM    Calcium 9.6 03/23/2022 11:52 AM    Bilirubin, total 0.4 05/28/2021 06:52 AM    Alk. phosphatase 62 05/28/2021 06:52 AM    Protein, total 5.9 (L) 05/28/2021 06:52 AM    Albumin 2.9 (L) 05/28/2021 06:52 AM    Globulin 3.0 05/28/2021 06:52 AM    A-G Ratio 1.0 (L) 05/28/2021 06:52 AM    ALT (SGPT) 23 05/28/2021 06:52 AM      ECG: 3/23/2022: Normal sinus rhythm     ASSESSMENT/RECOMMENDATIONS:.      1. Presyncope  -Did not have any episodes of atrial fibrillation and is not anticoagulated  2.  Dyslipidemia on Zetia   -Last LDL was at goal at 68 and this was June 2022  -Calcium score was elevated in the 60s. -Continue current dose of Praluent  -Reduce her carbohydrate intake  3. Hypertension  -Blood pressure is at goal today at 120/60  4. Peripheral vascular disease status post what I believe is a femoropopliteal on the left leg with edema  -She was told she would be on Xarelto 2.5 mg twice daily from vascular surgery indefinitely. Follow-up with me in 6 months      Orders Placed This Encounter    Xarelto 2.5 mg tablet     Sig: Take 2.5 mg by mouth two (2) times a day.  OTHER,NON-FORMULARY,     Sig: Take 300 mg by mouth two (2) times a day. Canabrex       We discussed the expected course, resolution and complications of the diagnosis(es) in detail. Medication risks, benefits, costs, interactions, and alternatives were discussed as indicated. I advised him to contact the office if his condition worsens, changes or fails to improve as anticipated. He expressed understanding with the diagnosis(es) and plan          Follow-up and Dispositions  ·   Return in about 6 months (around 1/19/2023). I have discussed the diagnosis with  Phil Adams and the intended plan as seen in the above orders. Questions were answered concerning future plans. I have discussed medication side effects and warnings with the patient as well. Thank you,  Soumya Husain MD for involving me in the care of  Phil Adams. Please do not hesitate to contact me for further questions/concerns. Mike Pride MD, [address] [place] [address] Rd[address]  [phone]      [facility], [address] [place] [address] [facility]      [phone] / [phone] Fax

## 2022-07-19 NOTE — LETTER
7/19/2022    Patient: Nj Riley   YOB: 1945   Date of Visit: 7/19/2022     Surendra Del Rio MD  [address] [address]  Via Fax: [phone]    Dear Surendra Del Rio MD,      Thank you for referring Ms. Crystal Varma to [facility] for evaluation. My notes for this consultation are attached. If you have questions, please do not hesitate to call me. I look forward to following your patient along with you.       Sincerely,    Kenia Holbrook MD

## 2022-07-19 NOTE — PATIENT INSTRUCTIONS

## 2022-07-19 NOTE — PROGRESS NOTES
Chief Complaint   Patient presents with    Follow-up     after cath    Dizziness    Shortness of Breath    Hypertension       Vitals:    07/19/22 1045   BP: 120/60   Pulse: 70   Height: 5' 6\" (1.676 m)   Weight: 161 lb 4.8 oz (73.2 kg)   SpO2: 98%         Chest pain: no    SOB: yes, when being active. Palpitations: no    Dizziness: yes, random. Swelling: Yes, legs. Refills:       1. Have you been to the ER, urgent care clinic since your last visit? Hospitalized since your last visit? no    2. Have you sen or consulted other health care providers outside of the [facility] since your last visit?  (Include any pap smears or colon screening.)

## 2022-07-22 ENCOUNTER — DOCUMENTATION ONLY (OUTPATIENT)
Dept: CARDIOLOGY CLINIC | Age: 77
End: 2022-07-22

## 2022-07-22 NOTE — PROGRESS NOTES
Requested vascular op report from [facility] dates 4/2022 CMS/[facility])   • Post zoster neuralgia   • Type 2 diabetes mellitus with proliferative retinopathy of right eye, without long-term current use of insulin (CMS/[facility])   • Sensorineural hearing loss (SNHL) of both ears   • MGUS (monoclonal gammopathy of unknown significance)   • Stage 3 chronic kidney disease (CMS/[facility])   • Ischemic cardiomyopathy   • CHF (congestive heart failure), NYHA class II, chronic, systolic (CMS/[facility])   • NSTEMI (non-ST elevated myocardial infarction) (CMS/[facility])   • Josephine Abdul, CNP

## 2023-02-13 NOTE — PROGRESS NOTES
CARDIOLOGY OFFICE NOTE    Mike Hatch MD, [address], [place], [address] [facility]  Phone [phone]; Fax [phone]  Mobile [phone]   Voice Mail [phone]    LAST OFFICE VISIT : 6/2/2021  Nohemy Fernandez MD       ATTENTION:   This medical record was transcribed using an electronic medical records/speech recognition system. Although proofread, it may and can contain electronic, spelling and other errors. Corrections may be executed at a later time. Please feel free to contact us for any clarifications as needed. ICD-10-CM ICD-9-CM   1. Essential hypertension  I10 401.9   2. Palpitation  R00.2 785.1   3. SOB (shortness of breath)  R06.02 786.05            Shanae Bailey is a [de-identified] y.o. female being seen for hypertension, dyslipidemia and peripheral vascular disease. Cardiac risk factors: smoking/ tobacco exposure, family history, dyslipidemia, diabetes mellitus, hypertension, post-menopausal.  I have personally obtained the history from the patient. HISTORY OF PRESENTING ILLNESS      Shanae Bailey is a [de-identified] y.o. female who has a history of diabetes type 2 hypertension and asthma. She has been doing well with no chest pain or shortness of breath. She did have a heart catheterization that demonstrated insignificant CAD with a 30% LAD lesion and a 50% RCA lesion. She also has a history of vascular surgery in her lower extremities with the left and pop bypass performed at [facility].       I think since October she has heard this heartbeat in her ears bilaterally particular when she lays down. She says she feels like she may have it during the day. She checks her heart rhythm on her home monitor and is regular.            ACTIVE PROBLEM LIST     Patient Active Problem List    Diagnosis Date Noted    Syncope and collapse 05/26/2021    Chest pain at rest 05/26/2021    Hypertension 05/26/2021    DM type 2 (diabetes mellitus, type 2) ([facility] 2021           PAST MEDICAL HISTORY     Past Medical History:   Diagnosis Date    Adverse effect of anesthesia     difficulty waking with anesthesia     Arthritis     Asthma     Diabetes mellitus, type 2 ([facility]     Emphysema (subcutaneous) (surgical) resulting from a procedure     GERD (gastroesophageal reflux disease)     Hyperlipidemia     Hypertension     Osteoarthritis     Osteoporosis     Thyroid cancer ([facility]            PAST SURGICAL HISTORY     Past Surgical History:   Procedure Laterality Date    HX APPENDECTOMY      HX  SECTION      times two    HX CYST REMOVAL      right hand removed    HX DILATION AND CURETTAGE      HX ORTHOPAEDIC      broken wrist left put a plate in     HX THYROIDECTOMY      HX TONSILLECTOMY      HX TOTAL ABDOMINAL HYSTERECTOMY            ALLERGIES     Allergies   Allergen Reactions    Adhesive Rash    Ciprofloxacin-Dexamethasone Other (comments)     Headache    Codeine Nausea Only    Diflucan [Fluconazole] Other (comments)     Severe Headache    Fenofibrate Unknown (comments)     Cannot recall reaction    Statins-Hmg-Coa Reductase Inhibitors Myalgia    Sulfa (Sulfonamide Antibiotics) Rash          FAMILY HISTORY     Family History   Problem Relation Age of Onset    Asthma Father     Hypertension Father     Hypertension Mother     Stroke Mother     Cancer Sister         thyroid    negative for cardiac disease       SOCIAL HISTORY     Social History     Socioeconomic History    Marital status:    Tobacco Use    Smoking status: Former    Smokeless tobacco: Never    Tobacco comments:     quit 27 yrs ago   Vaping Use    Vaping Use: Never used   Substance and Sexual Activity    Alcohol use: No    Drug use: No    Sexual activity: Yes         MEDICATIONS     Current Outpatient Medications   Medication Sig    propranoloL (INDERAL) 10 mg tablet Take 10 mg by mouth three (3) times daily.  ONE TAB MORNING, TWO TABS EVENING    Xarelto 2.5 mg tablet Take 2.5 mg by mouth two (2) times a day. OTHER,NON-FORMULARY, Take 300 mg by mouth two (2) times a day. Canabrex    amLODIPine (NORVASC) 10 mg tablet Take 1 Tablet by mouth daily. quinapriL (ACCUPRIL) 20 mg tablet Take 20 mg by mouth two (2) times a day. esomeprazole (NEXIUM) 20 mg capsule Take  by mouth two (2) times a day. alirocumab (Praluent Pen) 75 mg/mL injector pen 75 mg by SubCUTAneous route Once every 2 weeks. denosumab (Prolia) 60 mg/mL injection 60 mg by SubCUTAneous route every 6 months.    magnesium oxide 500 mg tab Take 400 mg by mouth daily. Saccharomyces boulardii (FLORASTOR) 250 mg capsule Take 250 mg by mouth two (2) times a day. umeclidinium (Incruse Ellipta) 62.5 mcg/actuation inhaler Take 1 Puff by inhalation daily. cholecalciferol (VITAMIN D3) (1000 Units /25 mcg) tablet Take 5,000 Units by mouth daily. montelukast (SINGULAIR) 10 mg tablet Take 10 mg by mouth daily. nateglinide (STARLIX) 120 mg tablet Take 120 mg by mouth Before breakfast, lunch, and dinner. Twice a day if no lunch    levothyroxine (SYNTHROID) 100 mcg tablet Take 100 mcg by mouth Daily (before breakfast). liothyronine (CYTOMEL) 25 mcg tablet Take 12.5 mcg by mouth Daily (before breakfast). ZETIA 10 mg tablet Take 10 mg by mouth daily. aspirin 81 mg chewable tablet Take 81 mg by mouth every Tuesday Thursday, Saturday. cyanocobalamin, vitamin B-12, 5,000 mcg cap Take 5,000 mcg by mouth daily. levalbuterol tartrate (XOPENEX) 45 mcg/actuation inhaler Take 2 Puffs by inhalation every four (4) hours as needed for Shortness of Breath. ferrous sulfate 325 mg (65 mg iron) tablet Take 325 mg by mouth Daily (before breakfast). (Patient not taking: Reported on 2/14/2023)     No current facility-administered medications for this visit. I have reviewed the nurses notes, vitals, problem list, allergy list, medical history, family, social history and medications.        REVIEW OF SYMPTOMS   Pertinent positive per HPI General: Pt denies excessive weight gain or loss. Pt is able to conduct ADL's  HEENT: Denies blurred vision, headaches, hearing loss, epistaxis and difficulty swallowing. Respiratory: Denies cough, congestion, shortness of breath, YOUNGER, wheezing or stridor. Cardiovascular: Denies precordial pain, palpitations, edema or PND  Gastrointestinal: Denies poor appetite, indigestion, abdominal pain or blood in stool  Genitourinary: Denies hematuria, dysuria, increased urinary frequency  Musculoskeletal: Denies joint pain or swelling from muscles or joints  Neurologic: Denies tremor, paresthesias, headache, or sensory motor disturbance  Psychiatric: Denies confusion, insomnia, depression  Integumentray: Denies rash, itching or ulcers. Hematologic: Denies easy bruising, bleeding     PHYSICAL EXAMINATION      Vitals:    02/14/23 1250   BP: (!) 130/50   Pulse: (!) 57   SpO2: 99%   Weight: 160 lb (72.6 kg)   Height: 5' 6\" (1.676 m)       General: Well developed, in no acute distress. HEENT: No jaundice, oral mucosa moist, no oral ulcers  Neck: Supple, no stiffness, no lymphadenopathy, supple  Heart:  Normal S1/S2 negative S3 or S4. Regular, no murmur, gallop or rub, no jugular venous distention  Respiratory: Clear bilaterally x 4, no wheezing or rales  Extremities: Left leg edema with  compression hose on. Musculoskeletal: No clubbing, no deformities  Neuro: A&Ox3, speech clear, gait stable, cooperative, no focal neurologic deficits  Skin: Skin color is normal. No rashes or lesions. Non diaphoretic, moist.             DIAGNOSTIC DATA     1. Stress Test   5/28/21-Lexiscan-no ischemia, EF 72%     2. Echo   5/27/21-EF 60%, mild PI     3. CTA Chest   5/26/21-No acute process or evidence of pulmonary embolism. Emphysematous   Changes. 4. Lipids   2/10/21- , HDL 43, ,   1/19/22- , HDL 50, LDL 40,   6/14/22- , HDL 46, LDL 68,     5.  Calcium Score  7/22/21- LM 0  LAD 22.4  LCirc 0  RCA 39.4    Total 61.8    6. Cardiac Cath  3/29/22-Left Main  The vessel was visualized by angiography. The vessel is angiographically normal.  Left Anterior Descending  Mid LAD lesion, 30% stenosed. Left Circumflex  The vessel exhibits minimal luminal irregularities. Right Coronary Artery  Mid RCA lesion, 50% stenosed. Pressure wire/iFR was perfromed on the lesion. Flow Reserve: 0.98.    7. Loop  3/31/22-4/19/22- SR, average HR 69, max 111, min 51    8. 4/20/22- L Fem-Pop using 6 mm ringed Propaten PTFE graft         LABORATORY DATA            Lab Results   Component Value Date/Time    WBC 11.6 (H) 03/23/2022 11:52 AM    HGB 12.8 03/23/2022 11:52 AM    HCT 39.7 03/23/2022 11:52 AM    PLATELET 391 85/21/2283 11:52 AM    MCV 93.9 03/23/2022 11:52 AM      Lab Results   Component Value Date/Time    Sodium 134 (L) 03/23/2022 11:52 AM    Potassium 5.5 (H) 03/23/2022 11:52 AM    Chloride 102 03/23/2022 11:52 AM    CO2 26 03/23/2022 11:52 AM    Anion gap 6 03/23/2022 11:52 AM    Glucose 258 (H) 03/23/2022 11:52 AM    BUN 28 (H) 03/23/2022 11:52 AM    Creatinine 1.28 (H) 03/23/2022 11:52 AM    BUN/Creatinine ratio 22 (H) 03/23/2022 11:52 AM    GFR est AA 49 (L) 03/23/2022 11:52 AM    GFR est non-AA 40 (L) 03/23/2022 11:52 AM    Calcium 9.6 03/23/2022 11:52 AM    Bilirubin, total 0.4 05/28/2021 06:52 AM    Alk. phosphatase 62 05/28/2021 06:52 AM    Protein, total 5.9 (L) 05/28/2021 06:52 AM    Albumin 2.9 (L) 05/28/2021 06:52 AM    Globulin 3.0 05/28/2021 06:52 AM    A-G Ratio 1.0 (L) 05/28/2021 06:52 AM    ALT (SGPT) 23 05/28/2021 06:52 AM      ECG: 3/23/2022: Normal sinus rhythm     ASSESSMENT/RECOMMENDATIONS:.      Presyncope  -No episodes of lightheadedness or dizziness  Dyslipidemia on Zetia   -On Praluent and her cholesterol is under good control  -Lab requisition given to her to check her cholesterol profile  Hypertension  -Blood pressure is at goal today at 130/50  4.   Peripheral vascular disease   -Status post and pop bypass  5. A whooshing sound in her ears  -We will check carotid ultrasound to ensure she does not have a carotid stenosis that she is hearing    Follow-up with me in 6 months      Orders Placed This Encounter    propranoloL (INDERAL) 10 mg tablet     Sig: Take 10 mg by mouth three (3) times daily. ONE TAB MORNING, TWO TABS EVENING         We discussed the expected course, resolution and complications of the diagnosis(es) in detail. Medication risks, benefits, costs, interactions, and alternatives were discussed as indicated. I advised him to contact the office if his condition worsens, changes or fails to improve as anticipated. He expressed understanding with the diagnosis(es) and plan          Follow-up and Dispositions    Return in about 6 months (around 8/14/2023). I have discussed the diagnosis with  Kalpana Graves and the intended plan as seen in the above orders. Questions were answered concerning future plans. I have discussed medication side effects and warnings with the patient as well. Thank you,  Deborah Burgess MD for involving me in the care of  Kalpana Graves. Please do not hesitate to contact me for further questions/concerns. Mike Pride MD, [address]      [facility], [address] [place] [address] [address]      [phone] / [phone] Fax

## 2023-02-13 NOTE — PATIENT INSTRUCTIONS

## 2023-02-14 ENCOUNTER — OFFICE VISIT (OUTPATIENT)
Dept: CARDIOLOGY CLINIC | Age: 78
End: 2023-02-14
Payer: MEDICARE

## 2023-02-14 VITALS
OXYGEN SATURATION: 99 % | HEIGHT: 66 IN | BODY MASS INDEX: 25.71 KG/M2 | SYSTOLIC BLOOD PRESSURE: 130 MMHG | HEART RATE: 57 BPM | DIASTOLIC BLOOD PRESSURE: 50 MMHG | WEIGHT: 160 LBS

## 2023-02-14 DIAGNOSIS — I10 ESSENTIAL HYPERTENSION: Primary | ICD-10-CM

## 2023-02-14 DIAGNOSIS — R00.2 PALPITATION: ICD-10-CM

## 2023-02-14 DIAGNOSIS — R06.02 SOB (SHORTNESS OF BREATH): ICD-10-CM

## 2023-02-14 PROCEDURE — 3078F DIAST BP <80 MM HG: CPT | Performed by: SPECIALIST

## 2023-02-14 PROCEDURE — 3075F SYST BP GE 130 - 139MM HG: CPT | Performed by: SPECIALIST

## 2023-02-14 PROCEDURE — G8432 DEP SCR NOT DOC, RNG: HCPCS | Performed by: SPECIALIST

## 2023-02-14 PROCEDURE — 1101F PT FALLS ASSESS-DOCD LE1/YR: CPT | Performed by: SPECIALIST

## 2023-02-14 PROCEDURE — G0463 HOSPITAL OUTPT CLINIC VISIT: HCPCS | Performed by: SPECIALIST

## 2023-02-14 PROCEDURE — G8400 PT W/DXA NO RESULTS DOC: HCPCS | Performed by: SPECIALIST

## 2023-02-14 PROCEDURE — 1123F ACP DISCUSS/DSCN MKR DOCD: CPT | Performed by: SPECIALIST

## 2023-02-14 PROCEDURE — G8417 CALC BMI ABV UP PARAM F/U: HCPCS | Performed by: SPECIALIST

## 2023-02-14 PROCEDURE — G8536 NO DOC ELDER MAL SCRN: HCPCS | Performed by: SPECIALIST

## 2023-02-14 PROCEDURE — 1090F PRES/ABSN URINE INCON ASSESS: CPT | Performed by: SPECIALIST

## 2023-02-14 PROCEDURE — 99214 OFFICE O/P EST MOD 30 MIN: CPT | Performed by: SPECIALIST

## 2023-02-14 PROCEDURE — G8427 DOCREV CUR MEDS BY ELIG CLIN: HCPCS | Performed by: SPECIALIST

## 2023-02-14 RX ORDER — PROPRANOLOL HYDROCHLORIDE 10 MG/1
10 TABLET ORAL 3 TIMES DAILY
COMMUNITY

## 2023-02-14 NOTE — LETTER
2/14/2023    Patient: Bobo Blanco   YOB: 1945   Date of Visit: 2/14/2023     Olena Oneill MD  [address] [address]  Via Fax: [phone]    Dear Olena Oneill MD,      Thank you for referring Ms. Lyly Tsang to [address] [place] [address] for evaluation. My notes for this consultation are attached. If you have questions, please do not hesitate to call me. I look forward to following your patient along with you.       Sincerely,    Lisette Zavala MD

## 2023-02-14 NOTE — PROGRESS NOTES
Shanae Bailey is a [de-identified] y.o. female    Vitals:    02/14/23 1250   BP: (!) 130/50   BP 1 Location: Left upper arm   BP Patient Position: Sitting   BP Cuff Size: Adult   Pulse: (!) 57   Height: 5' 6\" (1.676 m)   Weight: 160 lb (72.6 kg)   SpO2: 99%       Chief Complaint   Patient presents with    Cholesterol Problem    Hypertension       Chest pain NO  SOB NO  Dizziness YES  Swelling LEFT LEG  Recent hospital visit NO  Refills NO  COVID VACCINE YES  HAD COVID? YES    CAN \"HEAR\" HEARTBEAT IN HER HEAD.

## 2023-02-28 ENCOUNTER — TELEPHONE (OUTPATIENT)
Dept: CARDIOLOGY CLINIC | Age: 78
End: 2023-02-28

## 2023-02-28 NOTE — TELEPHONE ENCOUNTER
Pt would like order for vascular test fax over to [phone] [facility]. . . Please Advise    Pt # [phone]

## 2023-03-06 ENCOUNTER — TELEPHONE (OUTPATIENT)
Dept: CARDIOLOGY CLINIC | Age: 78
End: 2023-03-06

## 2023-03-07 NOTE — TELEPHONE ENCOUNTER
Did you receive vascular results from [facility]? Pt called to receive results.          Pt# [phone]

## 2023-03-20 NOTE — TELEPHONE ENCOUNTER
Pt is calling because she hasn't been felling well since her last office visit. Pt has a monitor that the doctor recommend her to get. Pt said that the monitor is showing her that she is in Bradycardia. Pt said she is having pain in her shoulder. Pt said she had a Vascular test done and want's the doctor to review her results.     [phone]

## 2023-03-20 NOTE — TELEPHONE ENCOUNTER
Report in folder on your desk - you want to refer back to [facility]. You ordered test but she had done there instead of our office. She is unable to download Kardia strips but has gotten giovani a few times. She will try to get someone to help her to print strips.

## 2023-03-20 NOTE — TELEPHONE ENCOUNTER
MD Julieth Kapoor, LPN  Caller: Unspecified (2 weeks ago)  She had a done in their office and surprised they did not want to see her. I cannot deal with this they need to be seeing her in their office to have her call them.

## 2023-03-30 ENCOUNTER — OFFICE VISIT (OUTPATIENT)
Dept: CARDIOLOGY CLINIC | Age: 78
End: 2023-03-30
Payer: MEDICARE

## 2023-03-30 VITALS
BODY MASS INDEX: 24.43 KG/M2 | OXYGEN SATURATION: 97 % | RESPIRATION RATE: 18 BRPM | SYSTOLIC BLOOD PRESSURE: 142 MMHG | WEIGHT: 152 LBS | HEIGHT: 66 IN | DIASTOLIC BLOOD PRESSURE: 60 MMHG | HEART RATE: 54 BPM

## 2023-03-30 DIAGNOSIS — I73.9 PVD (PERIPHERAL VASCULAR DISEASE) (HCC): ICD-10-CM

## 2023-03-30 DIAGNOSIS — I10 ESSENTIAL HYPERTENSION: ICD-10-CM

## 2023-03-30 DIAGNOSIS — I77.9 BILATERAL CAROTID ARTERY DISEASE, UNSPECIFIED TYPE (HCC): ICD-10-CM

## 2023-03-30 DIAGNOSIS — M54.2 NECK PAIN: Primary | ICD-10-CM

## 2023-03-30 DIAGNOSIS — R42 DIZZINESS: ICD-10-CM

## 2023-03-30 DIAGNOSIS — E78.5 DYSLIPIDEMIA: ICD-10-CM

## 2023-03-30 PROCEDURE — G0463 HOSPITAL OUTPT CLINIC VISIT: HCPCS

## 2023-03-30 RX ORDER — TIZANIDINE HYDROCHLORIDE 4 MG/1
4 CAPSULE, GELATIN COATED ORAL AS NEEDED
COMMUNITY

## 2023-03-30 RX ORDER — LISINOPRIL 20 MG/1
20 TABLET ORAL DAILY
COMMUNITY

## 2023-03-30 NOTE — PROGRESS NOTES
Was recommended for isosorbide mononitrate 30mg by PCP. Verner Pu is a [de-identified] y.o. female    Chief Complaint   Patient presents with    Hypertension    Irregular Heart Beat    Shortness of Breath       Vitals:    03/30/23 1101   BP: (!) 150/60   BP 1 Location: Left upper arm   BP Patient Position: Sitting   BP Cuff Size: Adult   Pulse: (!) 54   Resp: 18   Height: 5' 6\" (1.676 m)   Weight: 152 lb (68.9 kg)   SpO2: 97%     Hears thumping in ear from heart at night. Chest pain No.  Shoulder pressure and jaw pain. Mainly at night before bed    SOB With and without activity    Dizziness NO    Swelling NO      1. Have you been to the ER, urgent care clinic since your last visit? Hospitalized since your last visit? No    2. Have you seen or consulted any other health care providers outside of the [address] [place] [address] since your last visit? Include any pap smears or colon screening.  No

## 2023-03-30 NOTE — LETTER
3/30/2023    Patient: Josemanuel Suarez   YOB: 1945   Date of Visit: 3/30/2023     Chelo Servin MD  [address] [address]  Via Fax: [phone]    Dear Chelo Servin MD,      Thank you for referring Ms. Komal Galvez to [address] [place] [address] for evaluation. My notes for this consultation are attached. If you have questions, please do not hesitate to call me. I look forward to following your patient along with you.       Sincerely,    FAVIAN Gold

## 2023-03-30 NOTE — PROGRESS NOTES
CARDIOLOGY OFFICE NOTE    Primary Cardiologist:  Kody Sanchez MD, [address], [place], [address] [facility]  Phone [phone]; Fax [phone]  Mobile [phone]   Voice Mail [phone]    LAST OFFICE VISIT : 6/2/2021  Pritesh Jerome MD       No diagnosis found. Emily Skaggs is a [de-identified] y.o. female being seen for hypertension, dyslipidemia and peripheral vascular disease. Cardiac risk factors: smoking/ tobacco exposure, family history, dyslipidemia, diabetes mellitus, hypertension, post-menopausal.  I have personally obtained the history from the patient. HISTORY OF PRESENTING ILLNESS      Emily Skaggs is a [de-identified] y.o. female who has a history of diabetes type 2 hypertension and asthma. She has been doing well with no chest pain or shortness of breath. She did have a heart catheterization that demonstrated insignificant CAD with a 30% LAD lesion and a 50% RCA lesion. She also has a history of vascular surgery in her lower extremities with the left and pop bypass performed at [facility].     Since October she has heard this heartbeat in her ears bilaterally particular when she lays down. She says she feels like she may have it during the day. She checks her heart rhythm on her home monitor and is regular. She is here today for \"heaviness\" in he shoulders and neck that got better with steroids, to review her Kardia mobile strips (which we normal SR), and to discuss her carotid doppler results.        ACTIVE PROBLEM LIST     Patient Active Problem List    Diagnosis Date Noted    Syncope and collapse 05/26/2021    Chest pain at rest 05/26/2021    Hypertension 05/26/2021    DM type 2 (diabetes mellitus, type 2) ([facility] [address] 05/26/2021           PAST MEDICAL HISTORY     Past Medical History:   Diagnosis Date    Adverse effect of anesthesia     difficulty waking with anesthesia     Arthritis     Asthma     Diabetes mellitus, type 2 (HCC)     Emphysema (subcutaneous) (surgical) resulting from a procedure     GERD (gastroesophageal reflux disease)     Hyperlipidemia     Hypertension     Osteoarthritis     Osteoporosis     Thyroid cancer ([facility] [address]            PAST SURGICAL HISTORY     Past Surgical History:   Procedure Laterality Date    HX APPENDECTOMY      HX  SECTION      times two    HX CYST REMOVAL      right hand removed    HX DILATION AND CURETTAGE      HX ORTHOPAEDIC      broken wrist left put a plate in     HX THYROIDECTOMY      HX TONSILLECTOMY      HX TOTAL ABDOMINAL HYSTERECTOMY            ALLERGIES     Allergies   Allergen Reactions    Adhesive Rash    Ciprofloxacin-Dexamethasone Other (comments)     Headache    Codeine Nausea Only    Diflucan [Fluconazole] Other (comments)     Severe Headache    Fenofibrate Unknown (comments)     Cannot recall reaction    Statins-Hmg-Coa Reductase Inhibitors Myalgia    Sulfa (Sulfonamide Antibiotics) Rash          FAMILY HISTORY     Family History   Problem Relation Age of Onset    Asthma Father     Hypertension Father     Hypertension Mother     Stroke Mother     Cancer Sister         thyroid    negative for cardiac disease       SOCIAL HISTORY     Social History     Socioeconomic History    Marital status:    Tobacco Use    Smoking status: Former    Smokeless tobacco: Never    Tobacco comments:     quit 27 yrs ago   Vaping Use    Vaping Use: Never used   Substance and Sexual Activity    Alcohol use: No    Drug use: No    Sexual activity: Yes         MEDICATIONS     Current Outpatient Medications   Medication Sig    lisinopriL (PRINIVIL, ZESTRIL) 20 mg tablet Take 20 mg by mouth daily. tiZANidine (ZANAFLEX) 4 mg capsule Take 4 mg by mouth as needed for Pain.    propranoloL (INDERAL) 10 mg tablet Take 10 mg by mouth three (3) times daily. ONE TAB MORNING, TWO TABS EVENING    Xarelto 2.5 mg tablet Take 2.5 mg by mouth two (2) times a day. OTHER,NON-FORMULARY, Take 300 mg by mouth two (2) times a day. Canabrex    esomeprazole (NEXIUM) 20 mg capsule Take  by mouth two (2) times a day. alirocumab (Praluent Pen) 75 mg/mL injector pen 75 mg by SubCUTAneous route Once every 2 weeks. denosumab (Prolia) 60 mg/mL injection 60 mg by SubCUTAneous route every 6 months.    magnesium oxide 500 mg tab Take 400 mg by mouth daily. Saccharomyces boulardii (FLORASTOR) 250 mg capsule Take 250 mg by mouth two (2) times a day. umeclidinium (Incruse Ellipta) 62.5 mcg/actuation inhaler Take 1 Puff by inhalation daily. cholecalciferol (VITAMIN D3) (1000 Units /25 mcg) tablet Take 5,000 Units by mouth daily. montelukast (SINGULAIR) 10 mg tablet Take 10 mg by mouth daily. nateglinide (STARLIX) 120 mg tablet Take 120 mg by mouth Before breakfast, lunch, and dinner. Twice a day if no lunch    levothyroxine (SYNTHROID) 100 mcg tablet Take 100 mcg by mouth Daily (before breakfast). liothyronine (CYTOMEL) 25 mcg tablet Take 12.5 mcg by mouth Daily (before breakfast). ZETIA 10 mg tablet Take 10 mg by mouth daily. aspirin 81 mg chewable tablet Take 81 mg by mouth every Tuesday Thursday, Saturday. cyanocobalamin, vitamin B-12, 5,000 mcg cap Take 5,000 mcg by mouth daily. levalbuterol tartrate (XOPENEX) 45 mcg/actuation inhaler Take 2 Puffs by inhalation every four (4) hours as needed for Shortness of Breath. amLODIPine (NORVASC) 10 mg tablet Take 1 Tablet by mouth daily. (Patient not taking: No sig reported)    quinapriL (ACCUPRIL) 20 mg tablet Take 20 mg by mouth two (2) times a day. (Patient not taking: Reported on 3/30/2023)     No current facility-administered medications for this visit. I have reviewed the nurses notes, vitals, problem list, allergy list, medical history, family, social history and medications. REVIEW OF SYMPTOMS   Pertinent positive per HPI   General: Pt denies excessive weight gain or loss.  Pt is able to conduct ADL's  HEENT: Denies blurred vision, headaches, hearing loss, epistaxis and difficulty swallowing. Respiratory: Denies cough, congestion, shortness of breath, YOUNGER, wheezing or stridor. Cardiovascular: Denies precordial pain, palpitations, edema or PND  Gastrointestinal: Denies poor appetite, indigestion, abdominal pain or blood in stool  Genitourinary: Denies hematuria, dysuria, increased urinary frequency  Musculoskeletal: neck and shoulder pain  Neurologic: Denies tremor, paresthesias, headache, or sensory motor disturbance  Psychiatric: Denies confusion, insomnia, depression  Integumentray: Denies rash, itching or ulcers. Hematologic: Denies easy bruising, bleeding     PHYSICAL EXAMINATION      Vitals:    03/30/23 1101 03/30/23 1121   BP: (!) 150/60 (!) 142/60   Pulse: (!) 54    Resp: 18    SpO2: 97%    Weight: 152 lb (68.9 kg)    Height: 5' 6\" (1.676 m)        General: Well developed, in no acute distress. HEENT: No jaundice, oral mucosa moist, no oral ulcers  Neck: Supple, no stiffness, no lymphadenopathy, supple  Heart:  Normal S1/S2 negative S3 or S4. Regular, no murmur, gallop or rub, no jugular venous distention  Respiratory: Clear bilaterally x 4, no wheezing or rales  Extremities: Left leg edema with  compression hose on. Musculoskeletal: No clubbing, no deformities  Neuro: A&Ox3, speech clear, gait stable, cooperative, no focal neurologic deficits  Skin: Skin color is normal. No rashes or lesions. Non diaphoretic, moist.             DIAGNOSTIC DATA     1. Stress Test   5/28/21-Lexiscan-no ischemia, EF 72%     2. Echo   5/27/21-EF 60%, mild PI     3. CTA Chest   5/26/21-No acute process or evidence of pulmonary embolism. Emphysematous   Changes. 4. Lipids   2/10/21- , HDL 43, ,   1/19/22- , HDL 50, LDL 40,   6/14/22- , HDL 46, LDL 68,     5. Calcium Score  7/22/21- LM 0  LAD 22.4  LCirc 0  RCA 39.4    Total 61.8    6. Cardiac Cath  3/29/22-Left Main  The vessel was visualized by angiography.  The vessel is angiographically normal.  Left Anterior Descending  Mid LAD lesion, 30% stenosed. Left Circumflex  The vessel exhibits minimal luminal irregularities. Right Coronary Artery  Mid RCA lesion, 50% stenosed. Pressure wire/iFR was perfromed on the lesion. Flow Reserve: 0.98.    7. Loop  3/31/22-4/19/22- SR, average HR 69, max 111, min 51    8. 4/20/22- L Fem-Pop using 6 mm ringed Propaten PTFE graft    9. MAGALIE  2/22/23- R-mod decreased perfusion, L- normal pressure, mild decreased L toe pressure, patent bypass graft    10. Carotid Duplex  3/2/23-50-79% [facility]         LABORATORY DATA            Lab Results   Component Value Date/Time    WBC 11.6 (H) 03/23/2022 11:52 AM    HGB 12.8 03/23/2022 11:52 AM    HCT 39.7 03/23/2022 11:52 AM    PLATELET 008 12/77/4872 11:52 AM    MCV 93.9 03/23/2022 11:52 AM      Lab Results   Component Value Date/Time    Sodium 134 (L) 03/23/2022 11:52 AM    Potassium 5.5 (H) 03/23/2022 11:52 AM    Chloride 102 03/23/2022 11:52 AM    CO2 26 03/23/2022 11:52 AM    Anion gap 6 03/23/2022 11:52 AM    Glucose 258 (H) 03/23/2022 11:52 AM    BUN 28 (H) 03/23/2022 11:52 AM    Creatinine 1.28 (H) 03/23/2022 11:52 AM    BUN/Creatinine ratio 22 (H) 03/23/2022 11:52 AM    GFR est AA 49 (L) 03/23/2022 11:52 AM    GFR est non-AA 40 (L) 03/23/2022 11:52 AM    Calcium 9.6 03/23/2022 11:52 AM    Bilirubin, total 0.4 05/28/2021 06:52 AM    Alk.  phosphatase 62 05/28/2021 06:52 AM    Protein, total 5.9 (L) 05/28/2021 06:52 AM    Albumin 2.9 (L) 05/28/2021 06:52 AM    Globulin 3.0 05/28/2021 06:52 AM    A-G Ratio 1.0 (L) 05/28/2021 06:52 AM    ALT (SGPT) 23 05/28/2021 06:52 AM      ECG: 3/23/2022: Normal sinus rhythm     ASSESSMENT/RECOMMENDATIONS:.      Presyncope  -Has had some some episodes of dizziness associated with neck fatigue, Kardio mobile shows SR/SB  -carotid Doppler 3/2/23-50-79%  Recommend Vascular Surgery follow up, Appointment 4/10  Dyslipidemia on Zetia   -On Praluent and her cholesterol is under good control  -Lab requisition given to her to check her cholesterol profile  Hypertension  -Blood pressure is elevated today due to stress. Home Bps ok 130/50  4. Peripheral vascular disease   -Status post and pop bypass  5. A whooshing sound in her ears  - carotid Doppler 3/2/23-50-79% [facility], Dr. Prince Durant  -Recommend Vascular Surgery follow up, Appointment 4/10  6. Neck/shoulder pain   [facility]    Follow-up with me in August      Orders Placed This Encounter    lisinopriL (PRINIVIL, ZESTRIL) 20 mg tablet     Sig: Take 20 mg by mouth daily. tiZANidine (ZANAFLEX) 4 mg capsule     Sig: Take 4 mg by mouth as needed for Pain. We discussed the expected course, resolution and complications of the diagnosis(es) in detail. Medication risks, benefits, costs, interactions, and alternatives were discussed as indicated. I advised him to contact the office if his condition worsens, changes or fails to improve as anticipated. He expressed understanding with the diagnosis(es) and plan                  I have discussed the diagnosis with  Halleyaya Balbuena and the intended plan as seen in the above orders. Questions were answered concerning future plans. I have discussed medication side effects and warnings with the patient as well. Thank you,  Javier Blanco MD for involving me in the care of  Halle Balbuena. Please do not hesitate to contact me for further questions/concerns.        Ambika Nolan [address] [place] [address], [address] [place] [address] [address]      [phone] / [phone] Fax

## 2023-04-24 RX ORDER — AMLODIPINE BESYLATE 10 MG/1
TABLET ORAL
Qty: 90 TABLET | Refills: 3 | Status: SHIPPED | OUTPATIENT
Start: 2023-04-24

## 2023-06-29 LAB — HBA1C MFR BLD HPLC: 7.8 %

## 2023-08-22 ENCOUNTER — OFFICE VISIT (OUTPATIENT)
Age: 78
End: 2023-08-22
Payer: MEDICARE

## 2023-08-22 VITALS
HEIGHT: 66 IN | BODY MASS INDEX: 25.39 KG/M2 | OXYGEN SATURATION: 95 % | WEIGHT: 158 LBS | HEART RATE: 54 BPM | DIASTOLIC BLOOD PRESSURE: 58 MMHG | SYSTOLIC BLOOD PRESSURE: 132 MMHG

## 2023-08-22 DIAGNOSIS — E78.5 HYPERLIPIDEMIA, UNSPECIFIED HYPERLIPIDEMIA TYPE: ICD-10-CM

## 2023-08-22 DIAGNOSIS — R06.02 SHORTNESS OF BREATH: ICD-10-CM

## 2023-08-22 DIAGNOSIS — I10 ESSENTIAL (PRIMARY) HYPERTENSION: Primary | ICD-10-CM

## 2023-08-22 DIAGNOSIS — I73.9 PERIPHERAL VASCULAR DISEASE, UNSPECIFIED (HCC): ICD-10-CM

## 2023-08-22 DIAGNOSIS — R00.2 PALPITATIONS: ICD-10-CM

## 2023-08-22 PROCEDURE — 1123F ACP DISCUSS/DSCN MKR DOCD: CPT | Performed by: SPECIALIST

## 2023-08-22 PROCEDURE — G8419 CALC BMI OUT NRM PARAM NOF/U: HCPCS | Performed by: SPECIALIST

## 2023-08-22 PROCEDURE — 93005 ELECTROCARDIOGRAM TRACING: CPT | Performed by: SPECIALIST

## 2023-08-22 PROCEDURE — 1090F PRES/ABSN URINE INCON ASSESS: CPT | Performed by: SPECIALIST

## 2023-08-22 PROCEDURE — G8427 DOCREV CUR MEDS BY ELIG CLIN: HCPCS | Performed by: SPECIALIST

## 2023-08-22 PROCEDURE — 3078F DIAST BP <80 MM HG: CPT | Performed by: SPECIALIST

## 2023-08-22 PROCEDURE — 93010 ELECTROCARDIOGRAM REPORT: CPT | Performed by: SPECIALIST

## 2023-08-22 PROCEDURE — 99214 OFFICE O/P EST MOD 30 MIN: CPT | Performed by: SPECIALIST

## 2023-08-22 PROCEDURE — 3075F SYST BP GE 130 - 139MM HG: CPT | Performed by: SPECIALIST

## 2023-08-22 PROCEDURE — 1036F TOBACCO NON-USER: CPT | Performed by: SPECIALIST

## 2023-08-22 PROCEDURE — G8400 PT W/DXA NO RESULTS DOC: HCPCS | Performed by: SPECIALIST

## 2023-08-22 RX ORDER — EMPAGLIFLOZIN 10 MG/1
10 TABLET, FILM COATED ORAL DAILY
COMMUNITY
Start: 2023-08-14

## 2023-08-22 RX ORDER — BLOOD-GLUCOSE METER
KIT MISCELLANEOUS
COMMUNITY
Start: 2023-01-22

## 2023-08-22 RX ORDER — LISINOPRIL 20 MG/1
TABLET ORAL
COMMUNITY
Start: 2023-08-04

## 2023-08-22 NOTE — PROGRESS NOTES
CARDIOLOGY OFFICE NOTE    Papa Marrero MD, [address], [place]  Phone [phone]; Fax [phone]  Mobile [phone]   Voice Mail [phone]    Primary care: Rema De Leon MD       ATTENTION:   This medical record was transcribed using an electronic medical records/speech recognition system. Although proofread, it may and can contain electronic, spelling and other errors. Corrections may be executed at a later time. Please feel free to contact us for any clarifications as needed. Reg Kent is a 78 y.o. female with  referred for  hypertension, dyslipidemia and peripheral vascular disease. Cardiac risk factors: smoking/ tobacco exposure, family history, dyslipidemia, diabetes mellitus, hypertension, post-menopausal.   I have personally obtained the history from the patient. HISTORY OF PRESENTING ILLNESS    Ms./Mr. Reg Kent  78 y.o. is very pleasant lady who has a history of T2DM, hypertension, peripheral vascular disease, CAD involving LAD of 30% RCA 50%  She is having some leg discomfort will be seeing her vascular surgeon next month. She asked about the carotid ultrasounds and since they have been followed at her vascular surgeon they will continue to perform this in their lab. No chest pain .            ACTIVE PROBLEM LIST     Patient Active Problem List    Diagnosis Date Noted    PVD (peripheral vascular disease) ([address]) 03/30/2023    Bilateral carotid artery disease ([address]) 03/30/2023    Hypertension 05/26/2021    Syncope and collapse 05/26/2021    DM type 2 (diabetes mellitus, type 2) ([address]) 05/26/2021    Chest pain at rest 05/26/2021           PAST MEDICAL HISTORY     Past Medical History:   Diagnosis Date    Adverse effect of anesthesia     difficulty waking with anesthesia     Arthritis     Asthma     Diabetes mellitus, type 2 ([address])     Emphysema (subcutaneous) (surgical) resulting from a

## 2023-08-22 NOTE — PATIENT INSTRUCTIONS

## 2024-02-22 ENCOUNTER — OFFICE VISIT (OUTPATIENT)
Age: 79
End: 2024-02-22

## 2024-02-22 VITALS
BODY MASS INDEX: 24.27 KG/M2 | HEIGHT: 66 IN | WEIGHT: 151 LBS | DIASTOLIC BLOOD PRESSURE: 60 MMHG | SYSTOLIC BLOOD PRESSURE: 124 MMHG

## 2024-02-22 DIAGNOSIS — R00.2 PALPITATIONS: ICD-10-CM

## 2024-02-22 DIAGNOSIS — R06.02 SHORTNESS OF BREATH: ICD-10-CM

## 2024-02-22 DIAGNOSIS — I73.9 PERIPHERAL VASCULAR DISEASE, UNSPECIFIED (HCC): ICD-10-CM

## 2024-02-22 DIAGNOSIS — I77.9 DISORDER OF ARTERIES AND ARTERIOLES, UNSPECIFIED (HCC): ICD-10-CM

## 2024-02-22 DIAGNOSIS — I10 ESSENTIAL (PRIMARY) HYPERTENSION: Primary | ICD-10-CM

## 2024-02-22 DIAGNOSIS — E78.5 HYPERLIPIDEMIA, UNSPECIFIED HYPERLIPIDEMIA TYPE: ICD-10-CM

## 2024-02-22 DIAGNOSIS — I10 ESSENTIAL (PRIMARY) HYPERTENSION: ICD-10-CM

## 2024-02-22 DIAGNOSIS — R42 DIZZINESS AND GIDDINESS: Primary | ICD-10-CM

## 2024-02-22 PROCEDURE — 1123F ACP DISCUSS/DSCN MKR DOCD: CPT | Performed by: SPECIALIST

## 2024-02-22 PROCEDURE — 3074F SYST BP LT 130 MM HG: CPT | Performed by: SPECIALIST

## 2024-02-22 PROCEDURE — 99214 OFFICE O/P EST MOD 30 MIN: CPT | Performed by: SPECIALIST

## 2024-02-22 PROCEDURE — 3078F DIAST BP <80 MM HG: CPT | Performed by: SPECIALIST

## 2024-02-22 NOTE — PROGRESS NOTES
CARDIOLOGY OFFICE NOTE    Papa Seth MD, [facility]    [address] [facility] [address], [place] [address]  Phone [phone]; Fax [phone]  Mobile [phone]   Voice Mail [phone]    Primary care: Digna Gaitan MD       ATTENTION:   This medical record was transcribed using an electronic medical records/speech recognition system.  Although proofread, it may and can contain electronic, spelling and other errors.  Corrections may be executed at a later time.  Please feel free to contact us for any clarifications as needed.             Siria Alfaro is a 78 y.o. female with  referred for  hypertension, dyslipidemia and peripheral vascular disease.       Cardiac risk factors: smoking/ tobacco exposure, family history, dyslipidemia, diabetes mellitus, hypertension, post-menopausal.   I have personally obtained the history from the patient.     HISTORY OF PRESENTING ILLNESS    Ms./Mr. Siria Alfaro  78 y.o. is very pleasant lady who has a history of T2DM, hypertension, peripheral vascular disease, CAD involving LAD of 30% RCA 50%.  She states she has been doing well no interval cardiac issues.  Reviewed her cholesterol profile is excellent.  She is aware that her hemoglobin A1c is elevated and feels that may be related to her Paxlovid that she was taking  .           ACTIVE PROBLEM LIST     Patient Active Problem List    Diagnosis Date Noted    PVD (peripheral vascular disease) ([facility]) 03/30/2023    Bilateral carotid artery disease ([facility]) 03/30/2023    Hypertension 05/26/2021    Syncope and collapse 05/26/2021    DM type 2 (diabetes mellitus, type 2) ([facility]) 05/26/2021    Chest pain at rest 05/26/2021           PAST MEDICAL HISTORY     Past Medical History:   Diagnosis Date    Adverse effect of anesthesia     difficulty waking with anesthesia     Arthritis     Asthma     Diabetes mellitus, type 2 ([facility])     Emphysema (subcutaneous) (surgical) resulting from a procedure     GERD

## 2024-02-22 NOTE — PATIENT INSTRUCTIONS

## 2024-02-22 NOTE — PROGRESS NOTES
Siria Alfaro is a 79 y.o. female    had concerns including Hypertension, Shortness of Breath, and Palpitations.    Vitals:    24 1353   BP: 124/60   Site: Left Upper Arm   Position: Sitting   Weight: 68.5 kg (151 lb)   Height: 1.676 m (5' 6\")        Chest pain NO    NAME Siria Alfaro         1945      MRN    [id number]      LAST OFFICE APPOINTMENT: 2023     DIAGNOSIS    ICD-10-CM    1. Essential (primary) hypertension  I10       2. Hyperlipidemia, unspecified hyperlipidemia type  E78.5       3. Peripheral vascular disease, unspecified (HCC)  I73.9       4. Palpitations  R00.2           HOME MEDICATION  Current Outpatient Medications   Medication Sig    JARDIANCE 10 MG tablet Take 1 tablet by mouth daily    blood glucose test strips (FREESTYLE LITE) strip BLOOD SUGAR TESTED DX E11.22 IN VITRO ONCE A DAY 90 DAYS    lisinopril (PRINIVIL;ZESTRIL) 20 MG tablet     aspirin 81 MG chewable tablet Take 1 tablet by mouth Pt states taking 3x a week    vitamin D (CHOLECALCIFEROL) 25 MCG (1000 UT) TABS tablet Take 5 tablets by mouth daily    Cyanocobalamin 5000 MCG CAPS Take 5,000 mcg by mouth daily    ezetimibe (ZETIA) 10 MG tablet Take 1 tablet by mouth daily    levalbuterol (XOPENEX HFA) 45 MCG/ACT inhaler Inhale 2 puffs into the lungs every 4 hours as needed    levothyroxine (SYNTHROID) 100 MCG tablet Take 1 tablet by mouth every morning (before breakfast)    liothyronine (CYTOMEL) 25 MCG tablet Take 0.5 tablets by mouth every morning (before breakfast)    Magnesium Oxide 500 MG TABS Take 400 mg by mouth daily    montelukast (SINGULAIR) 10 MG tablet Take 1 tablet by mouth daily    nateglinide (STARLIX) 120 MG tablet Take 1 tablet by mouth 3 times daily (before meals)    propranolol (INDERAL) 10 MG tablet Take 1 tablet by mouth 3 times daily    rivaroxaban (XARELTO) 2.5 MG TABS tablet Take 1 tablet by mouth 2 times daily    saccharomyces boulardii (FLORASTOR) 250 MG capsule Take 1 capsule by mouth 2

## 2024-03-05 ENCOUNTER — ANCILLARY PROCEDURE (OUTPATIENT)
Age: 79
End: 2024-03-05
Payer: MEDICARE

## 2024-03-05 DIAGNOSIS — I73.9 PERIPHERAL VASCULAR DISEASE, UNSPECIFIED (HCC): ICD-10-CM

## 2024-03-05 DIAGNOSIS — I77.9 DISORDER OF ARTERIES AND ARTERIOLES, UNSPECIFIED (HCC): ICD-10-CM

## 2024-03-05 DIAGNOSIS — R42 DIZZINESS AND GIDDINESS: ICD-10-CM

## 2024-03-05 DIAGNOSIS — I10 ESSENTIAL (PRIMARY) HYPERTENSION: ICD-10-CM

## 2024-03-05 DIAGNOSIS — R06.02 SHORTNESS OF BREATH: ICD-10-CM

## 2024-03-05 LAB
VAS LEFT CCA DIST EDV: 18.6 CM/S
VAS LEFT CCA DIST PSV: 82 CM/S
VAS LEFT CCA PROX EDV: 13 CM/S
VAS LEFT CCA PROX PSV: 88.1 CM/S
VAS LEFT ECA EDV: 11.06 CM/S
VAS LEFT ECA PSV: 71.2 CM/S
VAS LEFT ICA DIST EDV: 26.4 CM/S
VAS LEFT ICA DIST PSV: 117.8 CM/S
VAS LEFT ICA MID EDV: 20.9 CM/S
VAS LEFT ICA MID PSV: 73.1 CM/S
VAS LEFT ICA PROX EDV: 13.9 CM/S
VAS LEFT ICA PROX PSV: 66.5 CM/S
VAS LEFT ICA/CCA PSV: 1.34 NO UNITS
VAS LEFT VERTEBRAL EDV: 26.55 CM/S
VAS LEFT VERTEBRAL PSV: 114.2 CM/S
VAS RIGHT CCA DIST EDV: 13.4 CM/S
VAS RIGHT CCA DIST PSV: 86.6 CM/S
VAS RIGHT CCA PROX EDV: 13.4 CM/S
VAS RIGHT CCA PROX PSV: 87 CM/S
VAS RIGHT ECA EDV: 11.99 CM/S
VAS RIGHT ECA PSV: 116.9 CM/S
VAS RIGHT ICA DIST EDV: 20.9 CM/S
VAS RIGHT ICA DIST PSV: 69.5 CM/S
VAS RIGHT ICA MID EDV: 12.2 CM/S
VAS RIGHT ICA MID PSV: 70.2 CM/S
VAS RIGHT ICA PROX EDV: 10.8 CM/S
VAS RIGHT ICA PROX PSV: 67 CM/S
VAS RIGHT ICA/CCA PSV: 0.8 NO UNITS
VAS RIGHT VERTEBRAL EDV: 0.13 CM/S
VAS RIGHT VERTEBRAL PSV: 45.1 CM/S

## 2024-03-05 PROCEDURE — 93880 EXTRACRANIAL BILAT STUDY: CPT | Performed by: INTERNAL MEDICINE

## 2024-10-17 PROBLEM — N18.30 ANEMIA DUE TO STAGE 3 CHRONIC KIDNEY DISEASE (HCC): Status: ACTIVE | Noted: 2024-10-17

## 2024-10-17 PROBLEM — D50.9 IRON DEFICIENCY ANEMIA, UNSPECIFIED: Status: ACTIVE | Noted: 2024-10-17

## 2024-10-17 PROBLEM — D63.1 ANEMIA DUE TO STAGE 3 CHRONIC KIDNEY DISEASE (HCC): Status: ACTIVE | Noted: 2024-10-17

## 2024-10-17 RX ORDER — HEPARIN 100 UNIT/ML
500 SYRINGE INTRAVENOUS PRN
Status: CANCELLED | OUTPATIENT
Start: 2024-10-24

## 2024-10-17 RX ORDER — SODIUM CHLORIDE 0.9 % (FLUSH) 0.9 %
5-40 SYRINGE (ML) INJECTION PRN
Status: CANCELLED | OUTPATIENT
Start: 2024-10-24

## 2024-10-17 RX ORDER — DIPHENHYDRAMINE HYDROCHLORIDE 50 MG/ML
50 INJECTION INTRAMUSCULAR; INTRAVENOUS
Status: CANCELLED | OUTPATIENT
Start: 2024-10-24

## 2024-10-17 RX ORDER — ONDANSETRON 2 MG/ML
8 INJECTION INTRAMUSCULAR; INTRAVENOUS
Status: CANCELLED | OUTPATIENT
Start: 2024-10-24

## 2024-10-17 RX ORDER — EPINEPHRINE 1 MG/ML
0.3 INJECTION, SOLUTION INTRAMUSCULAR; SUBCUTANEOUS PRN
Status: CANCELLED | OUTPATIENT
Start: 2024-10-24

## 2024-10-17 RX ORDER — SODIUM CHLORIDE 9 MG/ML
INJECTION, SOLUTION INTRAVENOUS CONTINUOUS
Status: CANCELLED | OUTPATIENT
Start: 2024-10-24

## 2024-10-17 RX ORDER — FAMOTIDINE 10 MG/ML
20 INJECTION, SOLUTION INTRAVENOUS
Status: CANCELLED | OUTPATIENT
Start: 2024-10-24

## 2024-10-17 RX ORDER — ALBUTEROL SULFATE 90 UG/1
4 INHALANT RESPIRATORY (INHALATION) PRN
Status: CANCELLED | OUTPATIENT
Start: 2024-10-24

## 2024-10-17 RX ORDER — ACETAMINOPHEN 325 MG/1
650 TABLET ORAL
Status: CANCELLED | OUTPATIENT
Start: 2024-10-24

## 2024-10-17 RX ORDER — SODIUM CHLORIDE 9 MG/ML
5-250 INJECTION, SOLUTION INTRAVENOUS PRN
Status: CANCELLED | OUTPATIENT
Start: 2024-10-24

## 2024-10-24 ENCOUNTER — HOSPITAL ENCOUNTER (OUTPATIENT)
Facility: HOSPITAL | Age: 79
Setting detail: INFUSION SERIES
Discharge: HOME | End: 2024-10-24
Payer: MEDICARE

## 2024-10-24 VITALS
DIASTOLIC BLOOD PRESSURE: 60 MMHG | HEART RATE: 64 BPM | TEMPERATURE: 98.2 F | SYSTOLIC BLOOD PRESSURE: 136 MMHG | OXYGEN SATURATION: 98 % | RESPIRATION RATE: 16 BRPM

## 2024-10-24 DIAGNOSIS — D63.1 ANEMIA DUE TO STAGE 3 CHRONIC KIDNEY DISEASE, UNSPECIFIED WHETHER STAGE 3A OR 3B CKD (HCC): Primary | ICD-10-CM

## 2024-10-24 DIAGNOSIS — D50.9 IRON DEFICIENCY ANEMIA, UNSPECIFIED IRON DEFICIENCY ANEMIA TYPE: ICD-10-CM

## 2024-10-24 DIAGNOSIS — N18.30 ANEMIA DUE TO STAGE 3 CHRONIC KIDNEY DISEASE, UNSPECIFIED WHETHER STAGE 3A OR 3B CKD (HCC): Primary | ICD-10-CM

## 2024-10-24 PROCEDURE — 96374 THER/PROPH/DIAG INJ IV PUSH: CPT

## 2024-10-24 PROCEDURE — 2580000003 HC RX 258: Performed by: INTERNAL MEDICINE

## 2024-10-24 PROCEDURE — 6360000002 HC RX W HCPCS: Performed by: INTERNAL MEDICINE

## 2024-10-24 RX ORDER — SODIUM CHLORIDE 9 MG/ML
5-250 INJECTION, SOLUTION INTRAVENOUS PRN
OUTPATIENT
Start: 2024-10-31

## 2024-10-24 RX ORDER — SODIUM CHLORIDE 9 MG/ML
INJECTION, SOLUTION INTRAVENOUS CONTINUOUS
OUTPATIENT
Start: 2024-10-31

## 2024-10-24 RX ORDER — SODIUM CHLORIDE 0.9 % (FLUSH) 0.9 %
5-40 SYRINGE (ML) INJECTION PRN
OUTPATIENT
Start: 2024-10-31

## 2024-10-24 RX ORDER — ALBUTEROL SULFATE 90 UG/1
4 INHALANT RESPIRATORY (INHALATION) PRN
OUTPATIENT
Start: 2024-10-31

## 2024-10-24 RX ORDER — EPINEPHRINE 1 MG/ML
0.3 INJECTION, SOLUTION INTRAMUSCULAR; SUBCUTANEOUS PRN
OUTPATIENT
Start: 2024-10-31

## 2024-10-24 RX ORDER — FAMOTIDINE 10 MG/ML
20 INJECTION, SOLUTION INTRAVENOUS
OUTPATIENT
Start: 2024-10-31

## 2024-10-24 RX ORDER — SODIUM CHLORIDE 9 MG/ML
5-250 INJECTION, SOLUTION INTRAVENOUS PRN
Status: DISCONTINUED | OUTPATIENT
Start: 2024-10-24 | End: 2024-10-25 | Stop reason: HOSPADM

## 2024-10-24 RX ORDER — ONDANSETRON 2 MG/ML
8 INJECTION INTRAMUSCULAR; INTRAVENOUS
OUTPATIENT
Start: 2024-10-31

## 2024-10-24 RX ORDER — ACETAMINOPHEN 325 MG/1
650 TABLET ORAL
OUTPATIENT
Start: 2024-10-31

## 2024-10-24 RX ORDER — DIPHENHYDRAMINE HYDROCHLORIDE 50 MG/ML
50 INJECTION INTRAMUSCULAR; INTRAVENOUS
OUTPATIENT
Start: 2024-10-31

## 2024-10-24 RX ORDER — HEPARIN 100 UNIT/ML
500 SYRINGE INTRAVENOUS PRN
OUTPATIENT
Start: 2024-10-31

## 2024-10-24 RX ADMIN — FERUMOXYTOL 510 MG: 510 INJECTION INTRAVENOUS at 12:08

## 2024-10-24 NOTE — PROGRESS NOTES
Outpatient Infusion Center Short Visit Progress Note    Ms. Alfaro admitted to [facility] for Feraheme ambulatory in stable condition. Assessment completed. No new concerns voiced other than fatigue.    Vital Signs:  /60   Pulse 64   Temp 98.2 °F (36.8 °C) (Temporal)   Resp 16   SpO2 98%       R wrist PIV with positive blood return x 2 attempts.       Medications:  Medications Administered         ferumoxytol (FERAHEME) 510 mg in sodium chloride 0.9 % 100 mL IVPB Admin Date  10/24/2024 Action  New Bag Dose  510 mg Rate  508 mL/hr Route  IntraVENous Documented By  Ailin Mayen RN          Discharge instructions provided.    Patient tolerated treatment well. No s/s of reaction after 30 min observation. Patient discharged from Outpatient Infusion Center ambulatory in no distress. Patient aware of next appointment.    Dacia Myers RN  October 24, 2024    Future Appointments   Date Time Provider Department Center   10/29/2024  2:30 PM  MED INF CHAIR 6 [facility]   1/8/2025 11:20 AM Elroy Harris DO [facility] AMB

## 2024-10-29 ENCOUNTER — HOSPITAL ENCOUNTER (OUTPATIENT)
Facility: HOSPITAL | Age: 79
Setting detail: INFUSION SERIES
Discharge: HOME | End: 2024-10-29
Payer: MEDICARE

## 2024-10-29 VITALS
SYSTOLIC BLOOD PRESSURE: 125 MMHG | OXYGEN SATURATION: 98 % | TEMPERATURE: 97.9 F | RESPIRATION RATE: 16 BRPM | HEART RATE: 63 BPM | DIASTOLIC BLOOD PRESSURE: 58 MMHG

## 2024-10-29 DIAGNOSIS — D63.1 ANEMIA DUE TO STAGE 3 CHRONIC KIDNEY DISEASE, UNSPECIFIED WHETHER STAGE 3A OR 3B CKD (HCC): ICD-10-CM

## 2024-10-29 DIAGNOSIS — N18.30 ANEMIA DUE TO STAGE 3 CHRONIC KIDNEY DISEASE, UNSPECIFIED WHETHER STAGE 3A OR 3B CKD (HCC): ICD-10-CM

## 2024-10-29 DIAGNOSIS — D50.9 IRON DEFICIENCY ANEMIA, UNSPECIFIED IRON DEFICIENCY ANEMIA TYPE: Primary | ICD-10-CM

## 2024-10-29 PROCEDURE — 6360000002 HC RX W HCPCS: Performed by: INTERNAL MEDICINE

## 2024-10-29 PROCEDURE — 2580000003 HC RX 258: Performed by: INTERNAL MEDICINE

## 2024-10-29 PROCEDURE — 96365 THER/PROPH/DIAG IV INF INIT: CPT

## 2024-10-29 RX ORDER — SODIUM CHLORIDE 9 MG/ML
5-250 INJECTION, SOLUTION INTRAVENOUS PRN
OUTPATIENT
Start: 2024-10-31

## 2024-10-29 RX ORDER — FAMOTIDINE 10 MG/ML
20 INJECTION, SOLUTION INTRAVENOUS
OUTPATIENT
Start: 2024-10-31

## 2024-10-29 RX ORDER — SODIUM CHLORIDE 0.9 % (FLUSH) 0.9 %
5-40 SYRINGE (ML) INJECTION PRN
OUTPATIENT
Start: 2024-10-31

## 2024-10-29 RX ORDER — DIPHENHYDRAMINE HYDROCHLORIDE 50 MG/ML
50 INJECTION INTRAMUSCULAR; INTRAVENOUS
OUTPATIENT
Start: 2024-10-31

## 2024-10-29 RX ORDER — ALBUTEROL SULFATE 90 UG/1
4 INHALANT RESPIRATORY (INHALATION) PRN
OUTPATIENT
Start: 2024-10-31

## 2024-10-29 RX ORDER — EPINEPHRINE 1 MG/ML
0.3 INJECTION, SOLUTION INTRAMUSCULAR; SUBCUTANEOUS PRN
OUTPATIENT
Start: 2024-10-31

## 2024-10-29 RX ORDER — ACETAMINOPHEN 325 MG/1
650 TABLET ORAL
OUTPATIENT
Start: 2024-10-31

## 2024-10-29 RX ORDER — ONDANSETRON 2 MG/ML
8 INJECTION INTRAMUSCULAR; INTRAVENOUS
OUTPATIENT
Start: 2024-10-31

## 2024-10-29 RX ORDER — SODIUM CHLORIDE 9 MG/ML
INJECTION, SOLUTION INTRAVENOUS CONTINUOUS
OUTPATIENT
Start: 2024-10-31

## 2024-10-29 RX ORDER — HEPARIN 100 UNIT/ML
500 SYRINGE INTRAVENOUS PRN
OUTPATIENT
Start: 2024-10-31

## 2024-10-29 RX ADMIN — FERUMOXYTOL 510 MG: 510 INJECTION INTRAVENOUS at 15:12

## 2024-10-29 ASSESSMENT — PAIN SCALES - GENERAL: PAINLEVEL_OUTOF10: 0

## 2024-10-29 NOTE — PROGRESS NOTES
Outpatient Infusion Center Progress Note        Date: 10/29/24    Name: Siria Alfaro    MRN: [id number]         : 1945    MD: Sandra Guaman MD       Ms. Alfaro admitted to \[facility]\"" for Day 2/2 of Feraheme Infusion ambulatory in stable condition. Assessment completed, no acute issues at this time. No new concerns voiced.  24 gauge peripheral IV obtained in the right forearm without difficulty, line flushed and capped. No labs ordered for this visit.      ** Patient has received this medication in the past. Patient reports no previous reactions to the medication(s). Patient reports the following side effects: mild headache that resolved after a day.        Vitals:    10/29/24 1440 10/29/24 1540   BP: (!) 148/65 (!) 125/58   Pulse: 68 63   Resp: 18 16   Temp: 98.1 °F (36.7 °C) 97.9 °F (36.6 °C)   TempSrc: Temporal Temporal   SpO2: 100% 98%           Medications:  MEDICATIONS GIVEN:  Medications Administered         ferumoxytol (FERAHEME) 510 mg in sodium chloride 0.9 % 100 mL IVPB Admin Date  10/29/2024 Action  New Bag Dose  510 mg Rate  381 mL/hr Route  IntraVENous Documented By  Maxwell Lopez RN                Post-Infusion Vitals:  Vitals:    10/29/24 1540   BP: (!) 125/58   Pulse: 63   Resp: 16   Temp: 97.9 °F (36.6 °C)   SpO2: 98%           Pt tolerated treatment well, no adverse reactions noted. PIV maintained positive blood return throughout treatment, flushed with positive blood return at conclusion and removed and wrapped in coban per protocol. D/c home ambulatory in no distress. Patient is to follow up with the physician for their next appointment.        Future Appointments:  Future Appointments   Date Time Provider Department Center   2025 11:20 AM Elroy Harris DO [facility] AMB

## 2025-04-17 RX ORDER — ALBUTEROL SULFATE 90 UG/1
4 INHALANT RESPIRATORY (INHALATION) PRN
Status: CANCELLED | OUTPATIENT
Start: 2025-04-24

## 2025-04-17 RX ORDER — SODIUM CHLORIDE 9 MG/ML
INJECTION, SOLUTION INTRAVENOUS CONTINUOUS
Status: CANCELLED | OUTPATIENT
Start: 2025-04-24

## 2025-04-17 RX ORDER — HYDROCORTISONE SODIUM SUCCINATE 100 MG/2ML
100 INJECTION INTRAMUSCULAR; INTRAVENOUS
Status: CANCELLED | OUTPATIENT
Start: 2025-04-24

## 2025-04-17 RX ORDER — SODIUM CHLORIDE 9 MG/ML
5-250 INJECTION, SOLUTION INTRAVENOUS PRN
Status: CANCELLED | OUTPATIENT
Start: 2025-04-24

## 2025-04-17 RX ORDER — ONDANSETRON 2 MG/ML
8 INJECTION INTRAMUSCULAR; INTRAVENOUS
Status: CANCELLED | OUTPATIENT
Start: 2025-04-24

## 2025-04-17 RX ORDER — ACETAMINOPHEN 325 MG/1
650 TABLET ORAL
Status: CANCELLED | OUTPATIENT
Start: 2025-04-24

## 2025-04-17 RX ORDER — DIPHENHYDRAMINE HYDROCHLORIDE 50 MG/ML
50 INJECTION, SOLUTION INTRAMUSCULAR; INTRAVENOUS
Status: CANCELLED | OUTPATIENT
Start: 2025-04-24

## 2025-04-17 RX ORDER — HEPARIN 100 UNIT/ML
500 SYRINGE INTRAVENOUS PRN
Status: CANCELLED | OUTPATIENT
Start: 2025-04-24

## 2025-04-17 RX ORDER — EPINEPHRINE 1 MG/ML
0.3 INJECTION, SOLUTION INTRAMUSCULAR; SUBCUTANEOUS PRN
Status: CANCELLED | OUTPATIENT
Start: 2025-04-24

## 2025-04-17 RX ORDER — SODIUM CHLORIDE 0.9 % (FLUSH) 0.9 %
5-40 SYRINGE (ML) INJECTION PRN
Status: CANCELLED | OUTPATIENT
Start: 2025-04-24

## 2025-04-17 RX ORDER — FAMOTIDINE 10 MG/ML
20 INJECTION, SOLUTION INTRAVENOUS
Status: CANCELLED | OUTPATIENT
Start: 2025-04-24

## 2025-04-24 ENCOUNTER — HOSPITAL ENCOUNTER (OUTPATIENT)
Facility: HOSPITAL | Age: 80
Setting detail: INFUSION SERIES
Discharge: HOME | End: 2025-04-24
Payer: MEDICARE

## 2025-04-24 VITALS
DIASTOLIC BLOOD PRESSURE: 59 MMHG | RESPIRATION RATE: 16 BRPM | WEIGHT: 137.8 LBS | HEIGHT: 65 IN | SYSTOLIC BLOOD PRESSURE: 120 MMHG | BODY MASS INDEX: 22.96 KG/M2 | OXYGEN SATURATION: 99 % | TEMPERATURE: 97.9 F | HEART RATE: 62 BPM

## 2025-04-24 DIAGNOSIS — D50.9 IRON DEFICIENCY ANEMIA, UNSPECIFIED IRON DEFICIENCY ANEMIA TYPE: ICD-10-CM

## 2025-04-24 DIAGNOSIS — N18.30 ANEMIA DUE TO STAGE 3 CHRONIC KIDNEY DISEASE, UNSPECIFIED WHETHER STAGE 3A OR 3B CKD (HCC): Primary | ICD-10-CM

## 2025-04-24 DIAGNOSIS — D63.1 ANEMIA DUE TO STAGE 3 CHRONIC KIDNEY DISEASE, UNSPECIFIED WHETHER STAGE 3A OR 3B CKD (HCC): Primary | ICD-10-CM

## 2025-04-24 PROCEDURE — 96365 THER/PROPH/DIAG IV INF INIT: CPT

## 2025-04-24 PROCEDURE — 6360000002 HC RX W HCPCS: Performed by: INTERNAL MEDICINE

## 2025-04-24 PROCEDURE — 2580000003 HC RX 258: Performed by: INTERNAL MEDICINE

## 2025-04-24 RX ORDER — SODIUM CHLORIDE 9 MG/ML
5-250 INJECTION, SOLUTION INTRAVENOUS PRN
Status: CANCELLED | OUTPATIENT
Start: 2025-05-01

## 2025-04-24 RX ORDER — SODIUM CHLORIDE 9 MG/ML
INJECTION, SOLUTION INTRAVENOUS CONTINUOUS
Status: CANCELLED | OUTPATIENT
Start: 2025-05-01

## 2025-04-24 RX ORDER — FLUTICASONE FUROATE, UMECLIDINIUM BROMIDE AND VILANTEROL TRIFENATATE 100; 62.5; 25 UG/1; UG/1; UG/1
1 POWDER RESPIRATORY (INHALATION) DAILY
COMMUNITY

## 2025-04-24 RX ORDER — SODIUM CHLORIDE 9 MG/ML
5-250 INJECTION, SOLUTION INTRAVENOUS PRN
Status: DISCONTINUED | OUTPATIENT
Start: 2025-04-24 | End: 2025-04-25 | Stop reason: HOSPADM

## 2025-04-24 RX ORDER — FAMOTIDINE 10 MG/ML
20 INJECTION, SOLUTION INTRAVENOUS
Status: CANCELLED | OUTPATIENT
Start: 2025-05-01

## 2025-04-24 RX ORDER — SEMAGLUTIDE 0.68 MG/ML
0.25 INJECTION, SOLUTION SUBCUTANEOUS WEEKLY
COMMUNITY

## 2025-04-24 RX ORDER — HEPARIN 100 UNIT/ML
500 SYRINGE INTRAVENOUS PRN
Status: CANCELLED | OUTPATIENT
Start: 2025-05-01

## 2025-04-24 RX ORDER — EPINEPHRINE 1 MG/ML
0.3 INJECTION, SOLUTION INTRAMUSCULAR; SUBCUTANEOUS PRN
Status: CANCELLED | OUTPATIENT
Start: 2025-05-01

## 2025-04-24 RX ORDER — SODIUM CHLORIDE 0.9 % (FLUSH) 0.9 %
5-40 SYRINGE (ML) INJECTION PRN
Status: CANCELLED | OUTPATIENT
Start: 2025-05-01

## 2025-04-24 RX ORDER — ACETAMINOPHEN 325 MG/1
650 TABLET ORAL
Status: CANCELLED | OUTPATIENT
Start: 2025-05-01

## 2025-04-24 RX ORDER — ALBUTEROL SULFATE 90 UG/1
4 INHALANT RESPIRATORY (INHALATION) PRN
Status: CANCELLED | OUTPATIENT
Start: 2025-05-01

## 2025-04-24 RX ORDER — HYDROCORTISONE SODIUM SUCCINATE 100 MG/2ML
100 INJECTION INTRAMUSCULAR; INTRAVENOUS
Status: CANCELLED | OUTPATIENT
Start: 2025-05-01

## 2025-04-24 RX ORDER — ONDANSETRON 2 MG/ML
8 INJECTION INTRAMUSCULAR; INTRAVENOUS
Status: CANCELLED | OUTPATIENT
Start: 2025-05-01

## 2025-04-24 RX ORDER — DIPHENHYDRAMINE HYDROCHLORIDE 50 MG/ML
50 INJECTION, SOLUTION INTRAMUSCULAR; INTRAVENOUS
Status: CANCELLED | OUTPATIENT
Start: 2025-05-01

## 2025-04-24 RX ORDER — SODIUM BICARBONATE 650 MG/1
650 TABLET ORAL DAILY
COMMUNITY

## 2025-04-24 RX ADMIN — SODIUM CHLORIDE 10 ML/HR: 9 INJECTION, SOLUTION INTRAVENOUS at 13:32

## 2025-04-24 RX ADMIN — FERUMOXYTOL 510 MG: 510 INJECTION INTRAVENOUS at 13:33

## 2025-04-24 ASSESSMENT — PAIN SCALES - GENERAL: PAINLEVEL_OUTOF10: 6

## 2025-04-24 ASSESSMENT — PAIN DESCRIPTION - DESCRIPTORS: DESCRIPTORS: THROBBING

## 2025-04-24 ASSESSMENT — PAIN DESCRIPTION - PAIN TYPE: TYPE: CHRONIC PAIN

## 2025-04-24 ASSESSMENT — PAIN DESCRIPTION - LOCATION: LOCATION: BACK

## 2025-04-24 ASSESSMENT — PAIN DESCRIPTION - ORIENTATION: ORIENTATION: LOWER

## 2025-04-24 NOTE — PROGRESS NOTES
Outpatient Infusion Center Progress Note        Date: 25    Name: Siria Alfaro    MRN: [id number]         : 1945    MD: Sandra Guaman MD       Ms. Alfaro admitted to [facility] for Day 1/2 of Feraheme ambulatory in stable condition. Assessment completed, no acute issues at this time. No new concerns voiced.  24 gauge peripheral IV obtained in the left wrist without difficulty, line flushed and capped.      ** Patient has received this medication in the past. Patient reports no previous reactions to the medication(s).        Vitals:    25 1302 25 1357   BP: (!) 156/70 (!) 120/59   Pulse:  62   Resp: 18 16   Temp: 97.7 °F (36.5 °C) 97.9 °F (36.6 °C)   TempSrc: Temporal Temporal   SpO2: 98% 99%   Weight: 62.5 kg (137 lb 12.8 oz)    Height: 1.651 m (5' 5\")            Medications:  MEDICATIONS GIVEN:  Medications Administered         0.9 % sodium chloride infusion Admin Date  2025 Action  New Bag Dose  10 mL/hr Rate  10 mL/hr Route  IntraVENous Documented By  Maxwell Lopez, NEGRITO        ferumoxytol (FERAHEME) 510 mg in sodium chloride 0.9 % 100 mL IVPB Admin Date  2025 Action  New Bag Dose  510 mg Rate  381 mL/hr Route  IntraVENous Documented By  Maxwell Lopez RN                Post-Infusion Vitals:  Vitals:    25 1357   BP: (!) 120/59   Pulse: 62   Resp: 16   Temp: 97.9 °F (36.6 °C)   SpO2: 99%           Pt tolerated treatment well, no adverse reactions noted. PIV maintained positive blood return throughout treatment, flushed with positive blood return at conclusion and removed and wrapped in coban per protocol. D/c home ambulatory in no distress. Patient is aware of next appointment on 2025 @ 1300 at the [place] location.        Future Appointments:  Future Appointments   Date Time Provider Department Center   2025  1:00 PM SS CHEMO CHAIR 14 [facility]          11-Jan-2022

## 2025-05-02 ENCOUNTER — HOSPITAL ENCOUNTER (OUTPATIENT)
Facility: HOSPITAL | Age: 80
Setting detail: INFUSION SERIES
Discharge: HOME | End: 2025-05-02
Payer: MEDICARE

## 2025-05-02 VITALS
OXYGEN SATURATION: 97 % | HEART RATE: 67 BPM | RESPIRATION RATE: 18 BRPM | TEMPERATURE: 97.4 F | SYSTOLIC BLOOD PRESSURE: 147 MMHG | DIASTOLIC BLOOD PRESSURE: 65 MMHG

## 2025-05-02 DIAGNOSIS — D63.1 ANEMIA DUE TO STAGE 3 CHRONIC KIDNEY DISEASE, UNSPECIFIED WHETHER STAGE 3A OR 3B CKD (HCC): Primary | ICD-10-CM

## 2025-05-02 DIAGNOSIS — D50.9 IRON DEFICIENCY ANEMIA, UNSPECIFIED IRON DEFICIENCY ANEMIA TYPE: ICD-10-CM

## 2025-05-02 DIAGNOSIS — N18.30 ANEMIA DUE TO STAGE 3 CHRONIC KIDNEY DISEASE, UNSPECIFIED WHETHER STAGE 3A OR 3B CKD (HCC): Primary | ICD-10-CM

## 2025-05-02 PROCEDURE — 2580000003 HC RX 258: Performed by: INTERNAL MEDICINE

## 2025-05-02 PROCEDURE — 96365 THER/PROPH/DIAG IV INF INIT: CPT

## 2025-05-02 PROCEDURE — 6360000002 HC RX W HCPCS: Performed by: INTERNAL MEDICINE

## 2025-05-02 RX ORDER — ONDANSETRON 2 MG/ML
8 INJECTION INTRAMUSCULAR; INTRAVENOUS
OUTPATIENT
Start: 2025-05-08

## 2025-05-02 RX ORDER — SODIUM CHLORIDE 9 MG/ML
5-250 INJECTION, SOLUTION INTRAVENOUS PRN
OUTPATIENT
Start: 2025-05-08

## 2025-05-02 RX ORDER — EPINEPHRINE 1 MG/ML
0.3 INJECTION, SOLUTION INTRAMUSCULAR; SUBCUTANEOUS PRN
OUTPATIENT
Start: 2025-05-08

## 2025-05-02 RX ORDER — FAMOTIDINE 10 MG/ML
20 INJECTION, SOLUTION INTRAVENOUS
OUTPATIENT
Start: 2025-05-08

## 2025-05-02 RX ORDER — HEPARIN 100 UNIT/ML
500 SYRINGE INTRAVENOUS PRN
OUTPATIENT
Start: 2025-05-08

## 2025-05-02 RX ORDER — ACETAMINOPHEN 325 MG/1
650 TABLET ORAL
OUTPATIENT
Start: 2025-05-08

## 2025-05-02 RX ORDER — SODIUM CHLORIDE 9 MG/ML
INJECTION, SOLUTION INTRAVENOUS CONTINUOUS
OUTPATIENT
Start: 2025-05-08

## 2025-05-02 RX ORDER — DIPHENHYDRAMINE HYDROCHLORIDE 50 MG/ML
50 INJECTION, SOLUTION INTRAMUSCULAR; INTRAVENOUS
OUTPATIENT
Start: 2025-05-08

## 2025-05-02 RX ORDER — HYDROCORTISONE SODIUM SUCCINATE 100 MG/2ML
100 INJECTION INTRAMUSCULAR; INTRAVENOUS
OUTPATIENT
Start: 2025-05-08

## 2025-05-02 RX ORDER — SODIUM CHLORIDE 0.9 % (FLUSH) 0.9 %
5-40 SYRINGE (ML) INJECTION PRN
OUTPATIENT
Start: 2025-05-08

## 2025-05-02 RX ORDER — ALBUTEROL SULFATE 90 UG/1
4 INHALANT RESPIRATORY (INHALATION) PRN
OUTPATIENT
Start: 2025-05-08

## 2025-05-02 RX ORDER — SODIUM CHLORIDE 9 MG/ML
5-250 INJECTION, SOLUTION INTRAVENOUS PRN
Status: DISCONTINUED | OUTPATIENT
Start: 2025-05-02 | End: 2025-05-03 | Stop reason: HOSPADM

## 2025-05-02 RX ADMIN — SODIUM CHLORIDE 50 ML/HR: 9 INJECTION, SOLUTION INTRAVENOUS at 13:55

## 2025-05-02 RX ADMIN — FERUMOXYTOL 510 MG: 510 INJECTION INTRAVENOUS at 13:56

## 2025-05-02 NOTE — PROGRESS NOTES
Pt arrived to [facility] for Feraheme 2/2 in stable condition.  Assessment completed, no new concerns voiced at this time. 24G PIV inserted in LAC with positive blood return.    Vitals:    05/02/25 1315 05/02/25 1427   BP: (!) 155/65 (!) 147/65   Pulse: 73 67   Resp: 18    Temp: 97.4 °F (36.3 °C)    TempSrc: Temporal    SpO2: 97%          Medications Administered         0.9 % sodium chloride infusion Admin Date  05/02/2025 Action  New Bag Dose  50 mL/hr Rate  50 mL/hr Route  IntraVENous Documented By  Brittany Salinas, RN        ferumoxytol (FERAHEME) 510 mg in sodium chloride 0.9 % 100 mL IVPB Admin Date  05/02/2025 Action  New Bag Dose  510 mg Rate  381 mL/hr Route  IntraVENous Documented By  Brittany Salinas, RN          30 min observation period completed.     PIV flushed and removed per protocol. Pt tolerated treatment well. D/Cd from [facility] in no distress.    No future appointments.

## 2025-06-03 ENCOUNTER — OFFICE VISIT (OUTPATIENT)
Age: 80
End: 2025-06-03

## 2025-06-03 VITALS
DIASTOLIC BLOOD PRESSURE: 52 MMHG | OXYGEN SATURATION: 99 % | BODY MASS INDEX: 22.59 KG/M2 | SYSTOLIC BLOOD PRESSURE: 116 MMHG | WEIGHT: 135.6 LBS | HEIGHT: 65 IN | HEART RATE: 60 BPM

## 2025-06-03 DIAGNOSIS — R55 SYNCOPE AND COLLAPSE: ICD-10-CM

## 2025-06-03 DIAGNOSIS — I10 HYPERTENSION, ESSENTIAL: ICD-10-CM

## 2025-06-03 DIAGNOSIS — I65.23 BILATERAL CAROTID ARTERY STENOSIS: ICD-10-CM

## 2025-06-03 DIAGNOSIS — I25.110 CORONARY ARTERY DISEASE INVOLVING NATIVE CORONARY ARTERY OF NATIVE HEART WITH UNSTABLE ANGINA PECTORIS (HCC): Primary | ICD-10-CM

## 2025-06-03 DIAGNOSIS — D50.9 IRON DEFICIENCY ANEMIA, UNSPECIFIED IRON DEFICIENCY ANEMIA TYPE: ICD-10-CM

## 2025-06-03 DIAGNOSIS — I73.9 PVD (PERIPHERAL VASCULAR DISEASE): ICD-10-CM

## 2025-06-03 DIAGNOSIS — E78.00 HYPERCHOLESTEREMIA: ICD-10-CM

## 2025-06-03 PROBLEM — R07.9 CHEST PAIN AT REST: Status: RESOLVED | Noted: 2021-05-26 | Resolved: 2025-06-03

## 2025-06-03 PROBLEM — I25.10 CORONARY ARTERY DISEASE INVOLVING NATIVE CORONARY ARTERY OF NATIVE HEART WITHOUT ANGINA PECTORIS: Status: ACTIVE | Noted: 2025-06-03

## 2025-06-03 RX ORDER — AMLODIPINE BESYLATE 10 MG/1
10 TABLET ORAL DAILY
COMMUNITY
Start: 2025-05-28 | End: 2026-05-28

## 2025-06-03 RX ORDER — ASCORBIC ACID 500 MG
1000 TABLET ORAL DAILY
COMMUNITY

## 2025-06-03 RX ORDER — ACETAMINOPHEN 500 MG
500 TABLET ORAL PRN
COMMUNITY

## 2025-06-03 ASSESSMENT — PATIENT HEALTH QUESTIONNAIRE - PHQ9
SUM OF ALL RESPONSES TO PHQ QUESTIONS 1-9: 0
1. LITTLE INTEREST OR PLEASURE IN DOING THINGS: NOT AT ALL
SUM OF ALL RESPONSES TO PHQ QUESTIONS 1-9: 0
2. FEELING DOWN, DEPRESSED OR HOPELESS: NOT AT ALL
SUM OF ALL RESPONSES TO PHQ QUESTIONS 1-9: 0
SUM OF ALL RESPONSES TO PHQ QUESTIONS 1-9: 0

## 2025-06-03 NOTE — PROGRESS NOTES
1. Have you been to the ER, urgent care clinic since your last visit?  Hospitalized since your last visit?  No      2. Have you seen or consulted any other health care providers outside of the [facility] since your last visit?  Include any pap smears or colon screening.   PCP [facility]   
negative except as above ; Resp-denies wheezing  or productive cough,. Const- No unusual weight loss or fever; Neuro-no recent seizure or CVA ; GI- No BRBPR, abdom pain, bloating ; - no  hematuria   see supplement sheet, initialed and to be scanned by staff    Past Medical History:   Diagnosis Date    Adverse effect of anesthesia     difficulty waking with anesthesia     Arthritis     Asthma     Diabetes mellitus, type 2 (HCC)     Emphysema (subcutaneous) (surgical) resulting from a procedure     GERD (gastroesophageal reflux disease)     Hyperlipidemia     Hypertension     Osteoarthritis     Osteoporosis     Thyroid cancer (HCC)       Social Hx= reports that she has quit smoking. She has never used smokeless tobacco. She reports that she does not drink alcohol and does not use drugs.   Family Hx- family history includes Asthma in her father; Cancer in her sister; Hypertension in her father and mother; Stroke in her mother.   Allergies   Allergen Reactions    Adhesive Tape Rash    Ciprofloxacin-Dexamethasone Other (See Comments)     Headache    Codeine Nausea Only    Fenofibrate      Other reaction(s): Unknown (comments)  Cannot recall reaction    Fluconazole Other (See Comments)     Severe Headache    Statins Myalgia    Sulfa Antibiotics Rash        Exam and Labs:  BP (!) 116/52 (BP Site: Left Upper Arm, Patient Position: Sitting, BP Cuff Size: Medium Adult)   Pulse 60   Ht 1.651 m (5' 5\")   Wt 61.5 kg (135 lb 9.6 oz)   SpO2 99%   BMI 22.57 kg/m²    @Constitutional:  NAD, comfortable  Head: NC,AT. Eyes: No scleral icterus. Neck:  Neck supple. No JVD present.Throat: moist mucous membranes.  Chest: Effort normal & normal respiratory excursion .Neurological: alert, conversant and oriented . Skin: Skin is not cold. No obvious systemic rash noted. Not diaphoretic. No erythema.   Psychiatric:  Grossly normal mood and affect.  Behavior appears normal.   Extremities:  no clubbing or cyanosis. Abdomen: non

## 2025-06-03 NOTE — PATIENT INSTRUCTIONS
You have been scheduled for a nuclear stress test and an echo. We will send results on wriplt and see you back for an annual follow up.     Nuclear stress testing evaluates blood flow to your heart muscle and assesses cardiac function. There are 2 parts (Rest/Stress) to this procedure and will include exercise on a treadmill.    *Please arrive 15 minutes prior to your appointment time    Test Duration:    -One day testing will take 4 hours    Day of testing instructions:    NO CAFFEINE (not even decaffeinated products) 24 HOURS PRIOR TO TESTING. This includes coffee, soda, tea, chocolate, multivitamins, and migraine medication, like Excedrin or Fioricet that contains caffeine.  Nothing to eat or drink 4 HOURS prior to testing  NO smoking/vaping including any nicotine products for 12 hours prior to testing  Do not take Propranolol 24 hours prior. DIABETIC PATIENTS: Take half of your insulin with a light meal 4 hours before your test.  Wear comfortable clothes and shoes (Shirts with no metal, shorts or pants, tennis shoes, no heels or flip flops).

## 2025-06-16 ENCOUNTER — ANCILLARY PROCEDURE (OUTPATIENT)
Age: 80
End: 2025-06-16
Payer: MEDICARE

## 2025-06-16 VITALS
BODY MASS INDEX: 22.49 KG/M2 | SYSTOLIC BLOOD PRESSURE: 142 MMHG | DIASTOLIC BLOOD PRESSURE: 82 MMHG | HEIGHT: 65 IN | WEIGHT: 135 LBS | HEART RATE: 75 BPM

## 2025-06-16 VITALS
HEIGHT: 65 IN | SYSTOLIC BLOOD PRESSURE: 142 MMHG | BODY MASS INDEX: 22.49 KG/M2 | WEIGHT: 135 LBS | HEART RATE: 92 BPM | DIASTOLIC BLOOD PRESSURE: 82 MMHG

## 2025-06-16 DIAGNOSIS — R55 SYNCOPE AND COLLAPSE: ICD-10-CM

## 2025-06-16 DIAGNOSIS — E78.00 HYPERCHOLESTEREMIA: ICD-10-CM

## 2025-06-16 DIAGNOSIS — I25.110 CORONARY ARTERY DISEASE INVOLVING NATIVE CORONARY ARTERY OF NATIVE HEART WITH UNSTABLE ANGINA PECTORIS (HCC): ICD-10-CM

## 2025-06-16 DIAGNOSIS — I10 HYPERTENSION, ESSENTIAL: ICD-10-CM

## 2025-06-16 LAB
ECHO AO ASC DIAM: 3.4 CM
ECHO AO ASCENDING AORTA INDEX: 2.04 CM/M2
ECHO AO ROOT DIAM: 3.3 CM
ECHO AO ROOT INDEX: 1.98 CM/M2
ECHO AV AREA PEAK VELOCITY: 1.9 CM2
ECHO AV AREA VTI: 2.4 CM2
ECHO AV AREA/BSA PEAK VELOCITY: 1.1 CM2/M2
ECHO AV AREA/BSA VTI: 1.4 CM2/M2
ECHO AV MEAN GRADIENT: 4 MMHG
ECHO AV MEAN VELOCITY: 1 M/S
ECHO AV PEAK GRADIENT: 9 MMHG
ECHO AV PEAK VELOCITY: 1.5 M/S
ECHO AV VELOCITY RATIO: 0.6
ECHO AV VTI: 30 CM
ECHO BSA: 1.68 M2
ECHO EST RA PRESSURE: 3 MMHG
ECHO LA DIAMETER INDEX: 2.4 CM/M2
ECHO LA DIAMETER: 4 CM
ECHO LA TO AORTIC ROOT RATIO: 1.21
ECHO LA VOL A-L A2C: 97 ML (ref 22–52)
ECHO LA VOL A-L A4C: 49 ML (ref 22–52)
ECHO LA VOL BP: 69 ML (ref 22–52)
ECHO LA VOL MOD A2C: 91 ML (ref 22–52)
ECHO LA VOL MOD A4C: 45 ML (ref 22–52)
ECHO LA VOL/BSA BIPLANE: 41 ML/M2 (ref 16–34)
ECHO LA VOLUME AREA LENGTH: 74 ML
ECHO LA VOLUME INDEX A-L A2C: 58 ML/M2 (ref 16–34)
ECHO LA VOLUME INDEX A-L A4C: 29 ML/M2 (ref 16–34)
ECHO LA VOLUME INDEX AREA LENGTH: 44 ML/M2 (ref 16–34)
ECHO LA VOLUME INDEX MOD A2C: 54 ML/M2 (ref 16–34)
ECHO LA VOLUME INDEX MOD A4C: 27 ML/M2 (ref 16–34)
ECHO LV E' LATERAL VELOCITY: 9.43 CM/S
ECHO LV E' SEPTAL VELOCITY: 5.57 CM/S
ECHO LV FRACTIONAL SHORTENING: 33 % (ref 28–44)
ECHO LV INTERNAL DIMENSION DIASTOLE INDEX: 2.34 CM/M2
ECHO LV INTERNAL DIMENSION DIASTOLIC: 3.9 CM (ref 3.9–5.3)
ECHO LV INTERNAL DIMENSION SYSTOLIC INDEX: 1.56 CM/M2
ECHO LV INTERNAL DIMENSION SYSTOLIC: 2.6 CM
ECHO LV IVSD: 0.8 CM (ref 0.6–0.9)
ECHO LV MASS 2D: 89.7 G (ref 67–162)
ECHO LV MASS INDEX 2D: 53.7 G/M2 (ref 43–95)
ECHO LV POSTERIOR WALL DIASTOLIC: 0.8 CM (ref 0.6–0.9)
ECHO LV RELATIVE WALL THICKNESS RATIO: 0.41
ECHO LVOT AREA: 3.1 CM2
ECHO LVOT AV VTI INDEX: 0.79
ECHO LVOT DIAM: 2 CM
ECHO LVOT MEAN GRADIENT: 2 MMHG
ECHO LVOT PEAK GRADIENT: 4 MMHG
ECHO LVOT PEAK VELOCITY: 0.9 M/S
ECHO LVOT STROKE VOLUME INDEX: 44.7 ML/M2
ECHO LVOT SV: 74.7 ML
ECHO LVOT VTI: 23.8 CM
ECHO MV A VELOCITY: 1.27 M/S
ECHO MV AREA PHT: 2.5 CM2
ECHO MV AREA VTI: 1.8 CM2
ECHO MV E DECELERATION TIME (DT): 297.9 MS
ECHO MV E VELOCITY: 1 M/S
ECHO MV E/A RATIO: 0.79
ECHO MV E/E' LATERAL: 10.6
ECHO MV E/E' RATIO (AVERAGED): 14.28
ECHO MV E/E' SEPTAL: 17.95
ECHO MV LVOT VTI INDEX: 1.78
ECHO MV MAX VELOCITY: 1.4 M/S
ECHO MV MEAN GRADIENT: 3 MMHG
ECHO MV MEAN VELOCITY: 0.8 M/S
ECHO MV PEAK GRADIENT: 8 MMHG
ECHO MV PRESSURE HALF TIME (PHT): 86.4 MS
ECHO MV VTI: 42.3 CM
ECHO RA AREA 4C: 15.7 CM2
ECHO RA END SYSTOLIC VOLUME APICAL 4 CHAMBER INDEX BSA: 22 ML/M2
ECHO RA VOLUME: 37 ML
ECHO RIGHT VENTRICULAR SYSTOLIC PRESSURE (RVSP): 29 MMHG
ECHO RV FREE WALL PEAK S': 12.3 CM/S
ECHO RV INTERNAL DIMENSION: 4.2 CM
ECHO RV TAPSE: 2.6 CM (ref 1.7–?)
ECHO TV REGURGITANT MAX VELOCITY: 2.53 M/S
ECHO TV REGURGITANT PEAK GRADIENT: 26 MMHG

## 2025-06-16 PROCEDURE — 93306 TTE W/DOPPLER COMPLETE: CPT

## 2025-06-16 PROCEDURE — 93017 CV STRESS TEST TRACING ONLY: CPT | Performed by: SPECIALIST

## 2025-06-16 PROCEDURE — A9500 TC99M SESTAMIBI: HCPCS | Performed by: SPECIALIST

## 2025-06-16 RX ORDER — AMINOPHYLLINE 25 MG/ML
100 INJECTION, SOLUTION INTRAVENOUS
Status: COMPLETED | OUTPATIENT
Start: 2025-06-16 | End: 2025-06-16

## 2025-06-16 RX ORDER — TETRAKIS(2-METHOXYISOBUTYLISOCYANIDE)COPPER(I) TETRAFLUOROBORATE 1 MG/ML
8.2 INJECTION, POWDER, LYOPHILIZED, FOR SOLUTION INTRAVENOUS
Status: COMPLETED | OUTPATIENT
Start: 2025-06-16 | End: 2025-06-16

## 2025-06-16 RX ORDER — REGADENOSON 0.08 MG/ML
0.4 INJECTION, SOLUTION INTRAVENOUS
Status: COMPLETED | OUTPATIENT
Start: 2025-06-16 | End: 2025-06-16

## 2025-06-16 RX ORDER — TETRAKIS(2-METHOXYISOBUTYLISOCYANIDE)COPPER(I) TETRAFLUOROBORATE 1 MG/ML
24.7 INJECTION, POWDER, LYOPHILIZED, FOR SOLUTION INTRAVENOUS
Status: COMPLETED | OUTPATIENT
Start: 2025-06-16 | End: 2025-06-16

## 2025-06-16 RX ADMIN — AMINOPHYLLINE 100 MG: 25 INJECTION, SOLUTION INTRAVENOUS at 13:34

## 2025-06-16 RX ADMIN — TECHNETIUM TC 99M SESTAMIBI 8.2 MILLICURIE: 1 INJECTION, POWDER, FOR SOLUTION INTRAVENOUS at 12:00

## 2025-06-16 RX ADMIN — TECHNETIUM TC 99M SESTAMIBI 24.7 MILLICURIE: 1 INJECTION, POWDER, FOR SOLUTION INTRAVENOUS at 13:15

## 2025-06-16 RX ADMIN — REGADENOSON 0.4 MG: 0.08 INJECTION, SOLUTION INTRAVENOUS at 13:29

## 2025-06-21 LAB
ECHO BSA: 1.68 M2
NUC STRESS EJECTION FRACTION: 68 %
STRESS BASELINE DIAS BP: 76 MMHG
STRESS BASELINE HR: 73 BPM
STRESS BASELINE ST DEPRESSION: 0 MM
STRESS BASELINE SYS BP: 136 MMHG
STRESS ESTIMATED WORKLOAD: 0 METS
STRESS EXERCISE DUR MIN: 0 MIN
STRESS EXERCISE DUR SEC: 0 SEC
STRESS O2 SAT PEAK: 98 %
STRESS O2 SAT REST: 99 %
STRESS PEAK DIAS BP: 82 MMHG
STRESS PEAK SYS BP: 142 MMHG
STRESS PERCENT HR ACHIEVED: 78 %
STRESS POST PEAK HR: 108 BPM
STRESS RATE PRESSURE PRODUCT: NORMAL BPM*MMHG
STRESS ST DEPRESSION: 0 MM
STRESS TARGET HR: 138 BPM
TID: 1.08

## 2025-06-21 PROCEDURE — PBSHW PBB SHADOW CHARGE: Performed by: SPECIALIST

## 2025-06-21 PROCEDURE — 93018 CV STRESS TEST I&R ONLY: CPT | Performed by: SPECIALIST

## 2025-06-21 PROCEDURE — 78452 HT MUSCLE IMAGE SPECT MULT: CPT | Performed by: SPECIALIST

## 2025-06-21 PROCEDURE — 93016 CV STRESS TEST SUPVJ ONLY: CPT | Performed by: SPECIALIST

## 2025-07-02 LAB
ECHO AO ASC DIAM: 3.4 CM
ECHO AO ASCENDING AORTA INDEX: 2.04 CM/M2
ECHO AO ROOT DIAM: 3.3 CM
ECHO AO ROOT INDEX: 1.98 CM/M2
ECHO AV AREA PEAK VELOCITY: 1.9 CM2
ECHO AV AREA VTI: 2.4 CM2
ECHO AV AREA/BSA PEAK VELOCITY: 1.1 CM2/M2
ECHO AV AREA/BSA VTI: 1.4 CM2/M2
ECHO AV MEAN GRADIENT: 4 MMHG
ECHO AV MEAN VELOCITY: 1 M/S
ECHO AV PEAK GRADIENT: 9 MMHG
ECHO AV PEAK VELOCITY: 1.5 M/S
ECHO AV VELOCITY RATIO: 0.6
ECHO AV VTI: 30 CM
ECHO BSA: 1.68 M2
ECHO EST RA PRESSURE: 3 MMHG
ECHO LA DIAMETER INDEX: 2.4 CM/M2
ECHO LA DIAMETER: 4 CM
ECHO LA TO AORTIC ROOT RATIO: 1.21
ECHO LA VOL A-L A2C: 97 ML (ref 22–52)
ECHO LA VOL A-L A4C: 49 ML (ref 22–52)
ECHO LA VOL BP: 69 ML (ref 22–52)
ECHO LA VOL MOD A2C: 91 ML (ref 22–52)
ECHO LA VOL MOD A4C: 45 ML (ref 22–52)
ECHO LA VOL/BSA BIPLANE: 41 ML/M2 (ref 16–34)
ECHO LA VOLUME AREA LENGTH: 74 ML
ECHO LA VOLUME INDEX A-L A2C: 58 ML/M2 (ref 16–34)
ECHO LA VOLUME INDEX A-L A4C: 29 ML/M2 (ref 16–34)
ECHO LA VOLUME INDEX AREA LENGTH: 44 ML/M2 (ref 16–34)
ECHO LA VOLUME INDEX MOD A2C: 54 ML/M2 (ref 16–34)
ECHO LA VOLUME INDEX MOD A4C: 27 ML/M2 (ref 16–34)
ECHO LV E' LATERAL VELOCITY: 9.43 CM/S
ECHO LV E' SEPTAL VELOCITY: 5.57 CM/S
ECHO LV EF PHYSICIAN: 55 %
ECHO LV FRACTIONAL SHORTENING: 33 % (ref 28–44)
ECHO LV INTERNAL DIMENSION DIASTOLE INDEX: 2.34 CM/M2
ECHO LV INTERNAL DIMENSION DIASTOLIC: 3.9 CM (ref 3.9–5.3)
ECHO LV INTERNAL DIMENSION SYSTOLIC INDEX: 1.56 CM/M2
ECHO LV INTERNAL DIMENSION SYSTOLIC: 2.6 CM
ECHO LV IVSD: 0.8 CM (ref 0.6–0.9)
ECHO LV MASS 2D: 89.7 G (ref 67–162)
ECHO LV MASS INDEX 2D: 53.7 G/M2 (ref 43–95)
ECHO LV POSTERIOR WALL DIASTOLIC: 0.8 CM (ref 0.6–0.9)
ECHO LV RELATIVE WALL THICKNESS RATIO: 0.41
ECHO LVOT AREA: 3.1 CM2
ECHO LVOT AV VTI INDEX: 0.79
ECHO LVOT DIAM: 2 CM
ECHO LVOT MEAN GRADIENT: 2 MMHG
ECHO LVOT PEAK GRADIENT: 4 MMHG
ECHO LVOT PEAK VELOCITY: 0.9 M/S
ECHO LVOT STROKE VOLUME INDEX: 44.7 ML/M2
ECHO LVOT SV: 74.7 ML
ECHO LVOT VTI: 23.8 CM
ECHO MV A VELOCITY: 1.27 M/S
ECHO MV AREA PHT: 2.5 CM2
ECHO MV AREA VTI: 1.8 CM2
ECHO MV E DECELERATION TIME (DT): 297.9 MS
ECHO MV E VELOCITY: 1 M/S
ECHO MV E/A RATIO: 0.79
ECHO MV E/E' LATERAL: 10.6
ECHO MV E/E' RATIO (AVERAGED): 14.28
ECHO MV E/E' SEPTAL: 17.95
ECHO MV LVOT VTI INDEX: 1.78
ECHO MV MAX VELOCITY: 1.4 M/S
ECHO MV MEAN GRADIENT: 3 MMHG
ECHO MV MEAN VELOCITY: 0.8 M/S
ECHO MV PEAK GRADIENT: 8 MMHG
ECHO MV PRESSURE HALF TIME (PHT): 86.4 MS
ECHO MV VTI: 42.3 CM
ECHO RA AREA 4C: 15.7 CM2
ECHO RA END SYSTOLIC VOLUME APICAL 4 CHAMBER INDEX BSA: 22 ML/M2
ECHO RA VOLUME: 37 ML
ECHO RIGHT VENTRICULAR SYSTOLIC PRESSURE (RVSP): 29 MMHG
ECHO RV FREE WALL PEAK S': 12.3 CM/S
ECHO RV INTERNAL DIMENSION: 4.2 CM
ECHO RV TAPSE: 2.6 CM (ref 1.7–?)
ECHO TV REGURGITANT MAX VELOCITY: 2.53 M/S
ECHO TV REGURGITANT PEAK GRADIENT: 26 MMHG

## (undated) DEVICE — SOL IRRIGATION INJ NACL 0.9% 500ML BTL

## (undated) DEVICE — CATHETER KIT JL4 JR4 5FR 100CM 145 PGTL DXTERITY

## (undated) DEVICE — TR BAND RADIAL ARTERY COMPRESSION DEVICE: Brand: TR BAND

## (undated) DEVICE — Device: Brand: OMNIWIRE PRESSURE GUIDE WIRE

## (undated) DEVICE — BANDAGE COMPR SELF ADH 5 YDX3 IN TAN NS PREMIERPRO LF

## (undated) DEVICE — BANDAGE,GAUZE,BULKEE II,4.5"X4.1YD,STRL: Brand: MEDLINE

## (undated) DEVICE — ZIMMER® STERILE DISPOSABLE TOURNIQUET CUFF WITH PROTECTIVE SLEEVE AND PLC, DUAL PORT, SINGLE BLADDER, 18 IN. (46 CM)

## (undated) DEVICE — ROSEN CURVED WIRE GUIDE: Brand: ROSEN

## (undated) DEVICE — TUBING PRSS MON L6IN PVC M FEM CONN

## (undated) DEVICE — BLADE OPHTH 180DEG CUT SURF BLU STR SHRP DBL BVL GRINDLESS

## (undated) DEVICE — SUPPORT WRST AD W3.5XL9IN DIA14.5IN ART SFT ADJ HK AND LOOP

## (undated) DEVICE — GLIDESHEATH SLENDER ACCESS KIT: Brand: GLIDESHEATH SLENDER

## (undated) DEVICE — VALVE ANGIO ID0.11IN HEMSTAT MTL GUID WIRE INSRT TOOL AND

## (undated) DEVICE — GLOVE SURG SZ 6 THK91MIL LTX FREE SYN POLYISOPRENE ANTI

## (undated) DEVICE — SUT PROL 5-0 18IN P3 BLU --

## (undated) DEVICE — GLOVE ORANGE PI 7 1/2   MSG9075

## (undated) DEVICE — GLOVE ORANGE PI 7   MSG9070

## (undated) DEVICE — HAND-SFMCASU: Brand: MEDLINE INDUSTRIES, INC.

## (undated) DEVICE — CATH GUID COR JR4.0 6FR 100CM -- LAUNCHER

## (undated) DEVICE — HI-TORQUE VERSACORE MODIFIED J GUIDE WIRE SYSTEM 260 CM: Brand: HI-TORQUE VERSACORE

## (undated) DEVICE — SPONGE GZ W4XL4IN COT 12 PLY TYP VII WVN C FLD DSGN

## (undated) DEVICE — HEART CATH-SFMC: Brand: MEDLINE INDUSTRIES, INC.

## (undated) DEVICE — DRSG GZ OIL EMUL CURAD 3X3 --